# Patient Record
Sex: MALE | NOT HISPANIC OR LATINO | URBAN - METROPOLITAN AREA
[De-identification: names, ages, dates, MRNs, and addresses within clinical notes are randomized per-mention and may not be internally consistent; named-entity substitution may affect disease eponyms.]

---

## 2019-01-01 ENCOUNTER — AMBULATORY - HEALTHEAST (OUTPATIENT)
Dept: INFUSION THERAPY | Facility: CLINIC | Age: 79
End: 2019-01-01

## 2019-01-01 ENCOUNTER — HOSPITAL ENCOUNTER (OUTPATIENT)
Dept: PET IMAGING | Facility: HOSPITAL | Age: 79
Discharge: HOME | End: 2019-12-02
Attending: INTERNAL MEDICINE

## 2019-01-01 ENCOUNTER — OFFICE VISIT - HEALTHEAST (OUTPATIENT)
Dept: ONCOLOGY | Facility: CLINIC | Age: 79
End: 2019-01-01

## 2019-01-01 ENCOUNTER — INFUSION - HEALTHEAST (OUTPATIENT)
Dept: INFUSION THERAPY | Facility: CLINIC | Age: 79
End: 2019-01-01

## 2019-01-01 ENCOUNTER — COMMUNICATION - HEALTHEAST (OUTPATIENT)
Dept: RADIATION ONCOLOGY | Facility: CLINIC | Age: 79
End: 2019-01-01

## 2019-01-01 ENCOUNTER — HOSPITAL ENCOUNTER (OUTPATIENT)
Dept: PET IMAGING | Facility: HOSPITAL | Age: 79
Setting detail: RADIATION/ONCOLOGY SERIES
Discharge: STILL ADMITTED OR ADMINISTRATIVE | End: 2019-12-02
Attending: INTERNAL MEDICINE

## 2019-01-01 ENCOUNTER — HOSPITAL ENCOUNTER (OUTPATIENT)
Dept: RADIOLOGY | Facility: CLINIC | Age: 79
Discharge: HOME | End: 2019-12-18
Attending: RADIOLOGY

## 2019-01-01 ENCOUNTER — AMBULATORY - HEALTHEAST (OUTPATIENT)
Dept: ONCOLOGY | Facility: CLINIC | Age: 79
End: 2019-01-01

## 2019-01-01 ENCOUNTER — OFFICE VISIT - HEALTHEAST (OUTPATIENT)
Dept: RADIATION ONCOLOGY | Facility: CLINIC | Age: 79
End: 2019-01-01

## 2019-01-01 ENCOUNTER — HOSPITAL ENCOUNTER (OUTPATIENT)
Dept: CT IMAGING | Facility: CLINIC | Age: 79
Setting detail: RADIATION/ONCOLOGY SERIES
Discharge: STILL ADMITTED OR ADMINISTRATIVE | End: 2019-12-18
Attending: INTERNAL MEDICINE

## 2019-01-01 DIAGNOSIS — C34.11 PRIMARY CANCER OF RIGHT UPPER LOBE OF LUNG (H): ICD-10-CM

## 2019-01-01 DIAGNOSIS — N18.30 CHRONIC KIDNEY DISEASE (CKD), STAGE III (MODERATE) (H): ICD-10-CM

## 2019-01-01 DIAGNOSIS — Z51.12 ENCOUNTER FOR ANTINEOPLASTIC IMMUNOTHERAPY: ICD-10-CM

## 2019-01-01 DIAGNOSIS — D64.9 NORMOCHROMIC NORMOCYTIC ANEMIA: ICD-10-CM

## 2019-01-01 LAB
ALBUMIN SERPL-MCNC: 3 G/DL (ref 3.5–5)
ALBUMIN SERPL-MCNC: 3.2 G/DL (ref 3.5–5)
ALBUMIN SERPL-MCNC: 3.2 G/DL (ref 3.5–5)
ALBUMIN SERPL-MCNC: 3.3 G/DL (ref 3.5–5)
ALP SERPL-CCNC: 68 U/L (ref 45–120)
ALP SERPL-CCNC: 70 U/L (ref 45–120)
ALP SERPL-CCNC: 71 U/L (ref 45–120)
ALP SERPL-CCNC: 74 U/L (ref 45–120)
ALT SERPL W P-5'-P-CCNC: 12 U/L (ref 0–45)
ALT SERPL W P-5'-P-CCNC: 12 U/L (ref 0–45)
ALT SERPL W P-5'-P-CCNC: 14 U/L (ref 0–45)
ALT SERPL W P-5'-P-CCNC: 17 U/L (ref 0–45)
ANION GAP SERPL CALCULATED.3IONS-SCNC: 7 MMOL/L (ref 5–18)
ANION GAP SERPL CALCULATED.3IONS-SCNC: 7 MMOL/L (ref 5–18)
ANION GAP SERPL CALCULATED.3IONS-SCNC: 9 MMOL/L (ref 5–18)
ANION GAP SERPL CALCULATED.3IONS-SCNC: 9 MMOL/L (ref 5–18)
AST SERPL W P-5'-P-CCNC: 14 U/L (ref 0–40)
AST SERPL W P-5'-P-CCNC: 14 U/L (ref 0–40)
AST SERPL W P-5'-P-CCNC: 15 U/L (ref 0–40)
AST SERPL W P-5'-P-CCNC: 16 U/L (ref 0–40)
BASOPHILS # BLD AUTO: 0 THOU/UL (ref 0–0.2)
BASOPHILS NFR BLD AUTO: 0 % (ref 0–2)
BASOPHILS NFR BLD AUTO: 1 % (ref 0–2)
BILIRUB SERPL-MCNC: 0.3 MG/DL (ref 0–1)
BUN SERPL-MCNC: 27 MG/DL (ref 8–28)
BUN SERPL-MCNC: 29 MG/DL (ref 8–28)
BUN SERPL-MCNC: 29 MG/DL (ref 8–28)
BUN SERPL-MCNC: 36 MG/DL (ref 8–28)
CALCIUM SERPL-MCNC: 8.8 MG/DL (ref 8.5–10.5)
CALCIUM SERPL-MCNC: 8.9 MG/DL (ref 8.5–10.5)
CALCIUM SERPL-MCNC: 9.2 MG/DL (ref 8.5–10.5)
CALCIUM SERPL-MCNC: 9.2 MG/DL (ref 8.5–10.5)
CHLORIDE BLD-SCNC: 107 MMOL/L (ref 98–107)
CHLORIDE BLD-SCNC: 107 MMOL/L (ref 98–107)
CHLORIDE BLD-SCNC: 108 MMOL/L (ref 98–107)
CHLORIDE BLD-SCNC: 109 MMOL/L (ref 98–107)
CO2 SERPL-SCNC: 21 MMOL/L (ref 22–31)
CO2 SERPL-SCNC: 23 MMOL/L (ref 22–31)
CO2 SERPL-SCNC: 24 MMOL/L (ref 22–31)
CO2 SERPL-SCNC: 26 MMOL/L (ref 22–31)
CREAT SERPL-MCNC: 1.39 MG/DL (ref 0.7–1.3)
CREAT SERPL-MCNC: 1.42 MG/DL (ref 0.7–1.3)
CREAT SERPL-MCNC: 1.65 MG/DL (ref 0.7–1.3)
CREAT SERPL-MCNC: 1.79 MG/DL (ref 0.7–1.3)
EOSINOPHIL # BLD AUTO: 0.1 THOU/UL (ref 0–0.4)
EOSINOPHIL # BLD AUTO: 0.2 THOU/UL (ref 0–0.4)
EOSINOPHIL # BLD AUTO: 0.3 THOU/UL (ref 0–0.4)
EOSINOPHIL # BLD AUTO: 0.3 THOU/UL (ref 0–0.4)
EOSINOPHIL NFR BLD AUTO: 3 % (ref 0–6)
EOSINOPHIL NFR BLD AUTO: 5 % (ref 0–6)
EOSINOPHIL NFR BLD AUTO: 5 % (ref 0–6)
EOSINOPHIL NFR BLD AUTO: 6 % (ref 0–6)
ERYTHROCYTE [DISTWIDTH] IN BLOOD BY AUTOMATED COUNT: 15.3 % (ref 11–14.5)
ERYTHROCYTE [DISTWIDTH] IN BLOOD BY AUTOMATED COUNT: 16 % (ref 11–14.5)
ERYTHROCYTE [DISTWIDTH] IN BLOOD BY AUTOMATED COUNT: 18.5 % (ref 11–14.5)
ERYTHROCYTE [DISTWIDTH] IN BLOOD BY AUTOMATED COUNT: 20.4 % (ref 11–14.5)
GFR SERPL CREATININE-BSD FRML MDRD: 37 ML/MIN/1.73M2
GFR SERPL CREATININE-BSD FRML MDRD: 41 ML/MIN/1.73M2
GFR SERPL CREATININE-BSD FRML MDRD: 48 ML/MIN/1.73M2
GFR SERPL CREATININE-BSD FRML MDRD: 49 ML/MIN/1.73M2
GLUCOSE BLD-MCNC: 102 MG/DL (ref 70–125)
GLUCOSE BLD-MCNC: 114 MG/DL (ref 70–125)
GLUCOSE BLD-MCNC: 116 MG/DL (ref 70–125)
GLUCOSE BLD-MCNC: 141 MG/DL (ref 70–125)
GLUCOSE BLDC GLUCOMTR-MCNC: 91 MG/DL (ref 70–139)
HCT VFR BLD AUTO: 31.1 % (ref 40–54)
HCT VFR BLD AUTO: 33.7 % (ref 40–54)
HCT VFR BLD AUTO: 34 % (ref 40–54)
HCT VFR BLD AUTO: 34.9 % (ref 40–54)
HGB BLD-MCNC: 10.5 G/DL (ref 14–18)
HGB BLD-MCNC: 10.9 G/DL (ref 14–18)
HGB BLD-MCNC: 11.1 G/DL (ref 14–18)
HGB BLD-MCNC: 11.3 G/DL (ref 14–18)
HGB BLD-MCNC: 9.7 G/DL (ref 14–18)
INR PPP: 1.13 (ref 0.9–1.1)
LYMPHOCYTES # BLD AUTO: 0.5 THOU/UL (ref 0.8–4.4)
LYMPHOCYTES # BLD AUTO: 0.8 THOU/UL (ref 0.8–4.4)
LYMPHOCYTES # BLD AUTO: 0.9 THOU/UL (ref 0.8–4.4)
LYMPHOCYTES # BLD AUTO: 1 THOU/UL (ref 0.8–4.4)
LYMPHOCYTES NFR BLD AUTO: 11 % (ref 20–40)
LYMPHOCYTES NFR BLD AUTO: 17 % (ref 20–40)
LYMPHOCYTES NFR BLD AUTO: 18 % (ref 20–40)
LYMPHOCYTES NFR BLD AUTO: 18 % (ref 20–40)
MCH RBC QN AUTO: 29.8 PG (ref 27–34)
MCH RBC QN AUTO: 30.8 PG (ref 27–34)
MCH RBC QN AUTO: 31 PG (ref 27–34)
MCH RBC QN AUTO: 31.5 PG (ref 27–34)
MCHC RBC AUTO-ENTMCNC: 31.2 G/DL (ref 32–36)
MCHC RBC AUTO-ENTMCNC: 31.2 G/DL (ref 32–36)
MCHC RBC AUTO-ENTMCNC: 32.1 G/DL (ref 32–36)
MCHC RBC AUTO-ENTMCNC: 32.4 G/DL (ref 32–36)
MCV RBC AUTO: 96 FL (ref 80–100)
MCV RBC AUTO: 96 FL (ref 80–100)
MCV RBC AUTO: 98 FL (ref 80–100)
MCV RBC AUTO: 99 FL (ref 80–100)
MONOCYTES # BLD AUTO: 0.6 THOU/UL (ref 0–0.9)
MONOCYTES # BLD AUTO: 0.6 THOU/UL (ref 0–0.9)
MONOCYTES # BLD AUTO: 0.7 THOU/UL (ref 0–0.9)
MONOCYTES # BLD AUTO: 0.8 THOU/UL (ref 0–0.9)
MONOCYTES NFR BLD AUTO: 11 % (ref 2–10)
MONOCYTES NFR BLD AUTO: 14 % (ref 2–10)
MONOCYTES NFR BLD AUTO: 14 % (ref 2–10)
MONOCYTES NFR BLD AUTO: 15 % (ref 2–10)
NEUTROPHILS # BLD AUTO: 2.9 THOU/UL (ref 2–7.7)
NEUTROPHILS # BLD AUTO: 3 THOU/UL (ref 2–7.7)
NEUTROPHILS # BLD AUTO: 3.2 THOU/UL (ref 2–7.7)
NEUTROPHILS # BLD AUTO: 3.9 THOU/UL (ref 2–7.7)
NEUTROPHILS NFR BLD AUTO: 62 % (ref 50–70)
NEUTROPHILS NFR BLD AUTO: 64 % (ref 50–70)
NEUTROPHILS NFR BLD AUTO: 64 % (ref 50–70)
NEUTROPHILS NFR BLD AUTO: 70 % (ref 50–70)
PLATELET # BLD AUTO: 160 THOU/UL (ref 140–440)
PLATELET # BLD AUTO: 180 THOU/UL (ref 140–440)
PLATELET # BLD AUTO: 183 THOU/UL (ref 140–440)
PLATELET # BLD AUTO: 200 THOU/UL (ref 140–440)
PLATELET # BLD AUTO: 212 THOU/UL (ref 140–440)
PMV BLD AUTO: 10 FL (ref 8.5–12.5)
PMV BLD AUTO: 9.2 FL (ref 8.5–12.5)
PMV BLD AUTO: 9.7 FL (ref 8.5–12.5)
PMV BLD AUTO: 9.9 FL (ref 8.5–12.5)
POTASSIUM BLD-SCNC: 4.2 MMOL/L (ref 3.5–5)
POTASSIUM BLD-SCNC: 4.4 MMOL/L (ref 3.5–5)
POTASSIUM BLD-SCNC: 4.5 MMOL/L (ref 3.5–5)
POTASSIUM BLD-SCNC: 4.6 MMOL/L (ref 3.5–5)
PROT SERPL-MCNC: 6.1 G/DL (ref 6–8)
PROT SERPL-MCNC: 6.2 G/DL (ref 6–8)
PROT SERPL-MCNC: 6.5 G/DL (ref 6–8)
PROT SERPL-MCNC: 6.6 G/DL (ref 6–8)
RBC # BLD AUTO: 3.15 MILL/UL (ref 4.4–6.2)
RBC # BLD AUTO: 3.46 MILL/UL (ref 4.4–6.2)
RBC # BLD AUTO: 3.52 MILL/UL (ref 4.4–6.2)
RBC # BLD AUTO: 3.64 MILL/UL (ref 4.4–6.2)
SODIUM SERPL-SCNC: 137 MMOL/L (ref 136–145)
SODIUM SERPL-SCNC: 139 MMOL/L (ref 136–145)
SODIUM SERPL-SCNC: 140 MMOL/L (ref 136–145)
SODIUM SERPL-SCNC: 141 MMOL/L (ref 136–145)
TSH SERPL DL<=0.005 MIU/L-ACNC: 1.12 UIU/ML (ref 0.3–5)
TSH SERPL DL<=0.005 MIU/L-ACNC: 1.25 UIU/ML (ref 0.3–5)
TSH SERPL DL<=0.005 MIU/L-ACNC: 1.38 UIU/ML (ref 0.3–5)
TSH SERPL DL<=0.005 MIU/L-ACNC: 1.75 UIU/ML (ref 0.3–5)
WBC: 4.2 THOU/UL (ref 4–11)
WBC: 4.8 THOU/UL (ref 4–11)
WBC: 5 THOU/UL (ref 4–11)
WBC: 6.2 THOU/UL (ref 4–11)

## 2019-01-01 ASSESSMENT — MIFFLIN-ST. JEOR: SCORE: 1178.92

## 2019-03-13 ENCOUNTER — COMMUNICATION - HEALTHEAST (OUTPATIENT)
Dept: INTERNAL MEDICINE | Facility: CLINIC | Age: 79
End: 2019-03-13

## 2019-06-25 ENCOUNTER — RECORDS - HEALTHEAST (OUTPATIENT)
Dept: ADMINISTRATIVE | Facility: OTHER | Age: 79
End: 2019-06-25

## 2019-06-27 ENCOUNTER — RECORDS - HEALTHEAST (OUTPATIENT)
Dept: ADMINISTRATIVE | Facility: OTHER | Age: 79
End: 2019-06-27

## 2019-07-13 ENCOUNTER — RECORDS - HEALTHEAST (OUTPATIENT)
Dept: ADMINISTRATIVE | Facility: OTHER | Age: 79
End: 2019-07-13

## 2019-07-23 ENCOUNTER — OFFICE VISIT - HEALTHEAST (OUTPATIENT)
Dept: FAMILY MEDICINE | Facility: CLINIC | Age: 79
End: 2019-07-23

## 2019-07-23 DIAGNOSIS — D64.9 ANEMIA, UNSPECIFIED TYPE: ICD-10-CM

## 2019-07-23 DIAGNOSIS — C34.11 MALIGNANT NEOPLASM OF UPPER LOBE OF RIGHT LUNG (H): ICD-10-CM

## 2019-07-23 DIAGNOSIS — I10 ESSENTIAL HYPERTENSION: ICD-10-CM

## 2019-07-23 LAB
ALBUMIN SERPL-MCNC: 3.5 G/DL (ref 3.5–5)
ALP SERPL-CCNC: 76 U/L (ref 45–120)
ALT SERPL W P-5'-P-CCNC: 10 U/L (ref 0–45)
ANION GAP SERPL CALCULATED.3IONS-SCNC: 10 MMOL/L (ref 5–18)
AST SERPL W P-5'-P-CCNC: 15 U/L (ref 0–40)
BASOPHILS # BLD AUTO: 0 THOU/UL (ref 0–0.2)
BASOPHILS NFR BLD AUTO: 1 % (ref 0–2)
BILIRUB SERPL-MCNC: 0.3 MG/DL (ref 0–1)
BUN SERPL-MCNC: 34 MG/DL (ref 8–28)
CALCIUM SERPL-MCNC: 9.2 MG/DL (ref 8.5–10.5)
CHLORIDE BLD-SCNC: 101 MMOL/L (ref 98–107)
CO2 SERPL-SCNC: 23 MMOL/L (ref 22–31)
CREAT SERPL-MCNC: 1.95 MG/DL (ref 0.7–1.3)
EOSINOPHIL # BLD AUTO: 0.1 THOU/UL (ref 0–0.4)
EOSINOPHIL NFR BLD AUTO: 2 % (ref 0–6)
ERYTHROCYTE [DISTWIDTH] IN BLOOD BY AUTOMATED COUNT: 14.5 % (ref 11–14.5)
FERRITIN SERPL-MCNC: 271 NG/ML (ref 27–300)
GFR SERPL CREATININE-BSD FRML MDRD: 33 ML/MIN/1.73M2
GLUCOSE BLD-MCNC: 108 MG/DL (ref 70–125)
HCT VFR BLD AUTO: 32.9 % (ref 40–54)
HGB BLD-MCNC: 11 G/DL (ref 14–18)
IRON SATN MFR SERPL: 10 % (ref 20–50)
IRON SERPL-MCNC: 27 UG/DL (ref 42–175)
LYMPHOCYTES # BLD AUTO: 1.4 THOU/UL (ref 0.8–4.4)
LYMPHOCYTES NFR BLD AUTO: 23 % (ref 20–40)
MCH RBC QN AUTO: 29.6 PG (ref 27–34)
MCHC RBC AUTO-ENTMCNC: 33.6 G/DL (ref 32–36)
MCV RBC AUTO: 88 FL (ref 80–100)
MONOCYTES # BLD AUTO: 0.6 THOU/UL (ref 0–0.9)
MONOCYTES NFR BLD AUTO: 9 % (ref 2–10)
NEUTROPHILS # BLD AUTO: 4.1 THOU/UL (ref 2–7.7)
NEUTROPHILS NFR BLD AUTO: 65 % (ref 50–70)
PLATELET # BLD AUTO: 273 THOU/UL (ref 140–440)
PMV BLD AUTO: 8.1 FL (ref 7–10)
POTASSIUM BLD-SCNC: 4.7 MMOL/L (ref 3.5–5)
PROT SERPL-MCNC: 6.7 G/DL (ref 6–8)
RBC # BLD AUTO: 3.73 MILL/UL (ref 4.4–6.2)
SODIUM SERPL-SCNC: 134 MMOL/L (ref 136–145)
TIBC SERPL-MCNC: 267 UG/DL (ref 313–563)
TRANSFERRIN SERPL-MCNC: 213 MG/DL (ref 212–360)
WBC: 6.3 THOU/UL (ref 4–11)

## 2019-07-24 ENCOUNTER — COMMUNICATION - HEALTHEAST (OUTPATIENT)
Dept: FAMILY MEDICINE | Facility: CLINIC | Age: 79
End: 2019-07-24

## 2019-07-24 ENCOUNTER — OFFICE VISIT - HEALTHEAST (OUTPATIENT)
Dept: PULMONOLOGY | Facility: OTHER | Age: 79
End: 2019-07-24

## 2019-07-24 DIAGNOSIS — R91.8 LUNG MASS: ICD-10-CM

## 2019-07-24 ASSESSMENT — MIFFLIN-ST. JEOR: SCORE: 1197.07

## 2019-07-26 ENCOUNTER — COMMUNICATION - HEALTHEAST (OUTPATIENT)
Dept: NURSING | Facility: CLINIC | Age: 79
End: 2019-07-26

## 2019-07-29 ENCOUNTER — AMBULATORY - HEALTHEAST (OUTPATIENT)
Dept: PULMONOLOGY | Facility: OTHER | Age: 79
End: 2019-07-29

## 2019-07-31 ENCOUNTER — HOSPITAL ENCOUNTER (OUTPATIENT)
Dept: CT IMAGING | Facility: CLINIC | Age: 79
Discharge: HOME | End: 2019-07-31
Attending: INTERNAL MEDICINE

## 2019-07-31 ENCOUNTER — HOSPITAL ENCOUNTER (OUTPATIENT)
Dept: RESPIRATORY THERAPY | Facility: CLINIC | Age: 79
Discharge: HOME | End: 2019-07-31
Attending: INTERNAL MEDICINE

## 2019-07-31 DIAGNOSIS — R91.8 LUNG MASS: ICD-10-CM

## 2019-08-02 ENCOUNTER — OFFICE VISIT - HEALTHEAST (OUTPATIENT)
Dept: PULMONOLOGY | Facility: OTHER | Age: 79
End: 2019-08-02

## 2019-08-02 DIAGNOSIS — R91.8 LUNG MASS: ICD-10-CM

## 2019-08-05 ENCOUNTER — AMBULATORY - HEALTHEAST (OUTPATIENT)
Dept: FAMILY MEDICINE | Facility: CLINIC | Age: 79
End: 2019-08-05

## 2019-08-05 ENCOUNTER — COMMUNICATION - HEALTHEAST (OUTPATIENT)
Dept: SCHEDULING | Facility: CLINIC | Age: 79
End: 2019-08-05

## 2019-08-05 DIAGNOSIS — N18.30 CKD (CHRONIC KIDNEY DISEASE) STAGE 3, GFR 30-59 ML/MIN (H): ICD-10-CM

## 2019-08-09 ENCOUNTER — COMMUNICATION - HEALTHEAST (OUTPATIENT)
Dept: PULMONOLOGY | Facility: OTHER | Age: 79
End: 2019-08-09

## 2019-08-09 ENCOUNTER — HOSPITAL ENCOUNTER (OUTPATIENT)
Dept: PET IMAGING | Facility: HOSPITAL | Age: 79
Discharge: HOME | End: 2019-08-09
Attending: INTERNAL MEDICINE

## 2019-08-09 DIAGNOSIS — R91.8 LUNG MASS: ICD-10-CM

## 2019-08-09 LAB — GLUCOSE BLDC GLUCOMTR-MCNC: 101 MG/DL (ref 70–139)

## 2019-08-09 ASSESSMENT — MIFFLIN-ST. JEOR: SCORE: 1197.07

## 2019-08-14 ENCOUNTER — HOSPITAL ENCOUNTER (OUTPATIENT)
Dept: RESPIRATORY THERAPY | Facility: CLINIC | Age: 79
Discharge: HOME | End: 2019-08-14
Attending: INTERNAL MEDICINE | Admitting: INTERNAL MEDICINE

## 2019-08-14 DIAGNOSIS — R91.8 LUNG MASS: ICD-10-CM

## 2019-08-14 ASSESSMENT — MIFFLIN-ST. JEOR: SCORE: 1197.07

## 2019-08-15 ENCOUNTER — AMBULATORY - HEALTHEAST (OUTPATIENT)
Dept: INTENSIVE CARE | Facility: CLINIC | Age: 79
End: 2019-08-15

## 2019-08-15 DIAGNOSIS — R05.9 COUGH: ICD-10-CM

## 2019-08-15 LAB
LAB AP CHARGES (HE HISTORICAL CONVERSION): ABNORMAL
PATH REPORT.COMMENTS IMP SPEC: ABNORMAL
PATH REPORT.COMMENTS IMP SPEC: ABNORMAL
PATH REPORT.FINAL DX SPEC: ABNORMAL
PATH REPORT.GROSS SPEC: ABNORMAL
PATH REPORT.MICROSCOPIC SPEC OTHER STN: ABNORMAL
PATH REPORT.RELEVANT HX SPEC: ABNORMAL
RESULT FLAG (HE HISTORICAL CONVERSION): ABNORMAL

## 2019-08-16 ENCOUNTER — COMMUNICATION - HEALTHEAST (OUTPATIENT)
Dept: ONCOLOGY | Facility: HOSPITAL | Age: 79
End: 2019-08-16

## 2019-08-16 ENCOUNTER — AMBULATORY - HEALTHEAST (OUTPATIENT)
Dept: INTENSIVE CARE | Facility: CLINIC | Age: 79
End: 2019-08-16

## 2019-08-16 ENCOUNTER — AMBULATORY - HEALTHEAST (OUTPATIENT)
Dept: PULMONOLOGY | Facility: OTHER | Age: 79
End: 2019-08-16

## 2019-08-16 ENCOUNTER — AMBULATORY - HEALTHEAST (OUTPATIENT)
Dept: ONCOLOGY | Facility: HOSPITAL | Age: 79
End: 2019-08-16

## 2019-08-16 ENCOUNTER — RECORDS - HEALTHEAST (OUTPATIENT)
Dept: ADMINISTRATIVE | Facility: OTHER | Age: 79
End: 2019-08-16

## 2019-08-16 DIAGNOSIS — Z13.9 SCREENING FOR CONDITION: ICD-10-CM

## 2019-08-16 DIAGNOSIS — R05.9 COUGH: ICD-10-CM

## 2019-08-19 ENCOUNTER — AMBULATORY - HEALTHEAST (OUTPATIENT)
Dept: PULMONOLOGY | Facility: OTHER | Age: 79
End: 2019-08-19

## 2019-08-19 ENCOUNTER — AMBULATORY - HEALTHEAST (OUTPATIENT)
Dept: LAB | Facility: HOSPITAL | Age: 79
End: 2019-08-19

## 2019-08-19 DIAGNOSIS — R05.9 COUGH: ICD-10-CM

## 2019-08-19 DIAGNOSIS — Z11.1 SCREENING EXAMINATION FOR PULMONARY TUBERCULOSIS: ICD-10-CM

## 2019-08-19 LAB
GAMMA INTERFERON BACKGROUND BLD IA-ACNC: 0.06 IU/ML
M TB IFN-G BLD-IMP: NEGATIVE
MITOGEN IGNF BCKGRD COR BLD-ACNC: -0.03 IU/ML
MITOGEN IGNF BCKGRD COR BLD-ACNC: -0.03 IU/ML
QTF INTERPRETATION: NORMAL
QTF MITOGEN - NIL: 7.9 IU/ML

## 2019-08-20 ENCOUNTER — OFFICE VISIT - HEALTHEAST (OUTPATIENT)
Dept: ONCOLOGY | Facility: CLINIC | Age: 79
End: 2019-08-20

## 2019-08-20 DIAGNOSIS — C34.11 PRIMARY CANCER OF RIGHT UPPER LOBE OF LUNG (H): ICD-10-CM

## 2019-08-20 DIAGNOSIS — N18.30 CHRONIC KIDNEY DISEASE (CKD), STAGE III (MODERATE) (H): ICD-10-CM

## 2019-08-20 LAB
ALBUMIN SERPL-MCNC: 3.3 G/DL (ref 3.5–5)
ALP SERPL-CCNC: 79 U/L (ref 45–120)
ALT SERPL W P-5'-P-CCNC: <9 U/L (ref 0–45)
ANION GAP SERPL CALCULATED.3IONS-SCNC: 7 MMOL/L (ref 5–18)
AST SERPL W P-5'-P-CCNC: 11 U/L (ref 0–40)
BASOPHILS # BLD AUTO: 0.1 THOU/UL (ref 0–0.2)
BASOPHILS NFR BLD AUTO: 1 % (ref 0–2)
BILIRUB SERPL-MCNC: 0.3 MG/DL (ref 0–1)
BUN SERPL-MCNC: 34 MG/DL (ref 8–28)
CALCIUM SERPL-MCNC: 9.9 MG/DL (ref 8.5–10.5)
CAP COMMENT: ABNORMAL
CHLORIDE BLD-SCNC: 107 MMOL/L (ref 98–107)
CO2 SERPL-SCNC: 24 MMOL/L (ref 22–31)
CREAT SERPL-MCNC: 1.97 MG/DL (ref 0.7–1.3)
EOSINOPHIL # BLD AUTO: 0.3 THOU/UL (ref 0–0.4)
EOSINOPHIL NFR BLD AUTO: 5 % (ref 0–6)
ERYTHROCYTE [DISTWIDTH] IN BLOOD BY AUTOMATED COUNT: 15.2 % (ref 11–14.5)
GFR SERPL CREATININE-BSD FRML MDRD: 33 ML/MIN/1.73M2
GLUCOSE BLD-MCNC: 141 MG/DL (ref 70–125)
HCT VFR BLD AUTO: 33.9 % (ref 40–54)
HGB BLD-MCNC: 10.9 G/DL (ref 14–18)
LAB AP CHARGES (HE HISTORICAL CONVERSION): ABNORMAL
LAB AP INITIAL CYTO EVAL (HE HISTORICAL CONVERSION): ABNORMAL
LAB MED GENERAL PATH INTERP (HE HISTORICAL CONVERSION): ABNORMAL
LDH SERPL L TO P-CCNC: 132 U/L (ref 125–220)
LYMPHOCYTES # BLD AUTO: 1 THOU/UL (ref 0.8–4.4)
LYMPHOCYTES NFR BLD AUTO: 15 % (ref 20–40)
MCH RBC QN AUTO: 29.5 PG (ref 27–34)
MCHC RBC AUTO-ENTMCNC: 32.2 G/DL (ref 32–36)
MCV RBC AUTO: 92 FL (ref 80–100)
MONOCYTES # BLD AUTO: 0.5 THOU/UL (ref 0–0.9)
MONOCYTES NFR BLD AUTO: 8 % (ref 2–10)
NEUTROPHILS # BLD AUTO: 4.8 THOU/UL (ref 2–7.7)
NEUTROPHILS NFR BLD AUTO: 71 % (ref 50–70)
PATH REPORT.COMMENTS IMP SPEC: ABNORMAL
PATH REPORT.COMMENTS IMP SPEC: ABNORMAL
PATH REPORT.FINAL DX SPEC: ABNORMAL
PATH REPORT.MICROSCOPIC SPEC OTHER STN: ABNORMAL
PATH REPORT.RELEVANT HX SPEC: ABNORMAL
PLATELET # BLD AUTO: 276 THOU/UL (ref 140–440)
PMV BLD AUTO: 10.7 FL (ref 8.5–12.5)
POTASSIUM BLD-SCNC: 4.2 MMOL/L (ref 3.5–5)
PROT SERPL-MCNC: 7.1 G/DL (ref 6–8)
RBC # BLD AUTO: 3.7 MILL/UL (ref 4.4–6.2)
RESULT FLAG (HE HISTORICAL CONVERSION): ABNORMAL
SODIUM SERPL-SCNC: 138 MMOL/L (ref 136–145)
SPECIMEN DESCRIPTION: ABNORMAL
WBC: 6.7 THOU/UL (ref 4–11)

## 2019-08-20 ASSESSMENT — MIFFLIN-ST. JEOR: SCORE: 1170.76

## 2019-08-22 LAB
SPECIMEN STATUS: NORMAL
SPECIMEN STATUS: NORMAL

## 2019-08-25 ENCOUNTER — RECORDS - HEALTHEAST (OUTPATIENT)
Dept: ADMINISTRATIVE | Facility: OTHER | Age: 79
End: 2019-08-25

## 2019-08-26 ENCOUNTER — OFFICE VISIT - HEALTHEAST (OUTPATIENT)
Dept: RADIATION ONCOLOGY | Facility: CLINIC | Age: 79
End: 2019-08-26

## 2019-08-26 DIAGNOSIS — C34.11 PRIMARY CANCER OF RIGHT UPPER LOBE OF LUNG (H): ICD-10-CM

## 2019-08-27 ENCOUNTER — AMBULATORY - HEALTHEAST (OUTPATIENT)
Dept: RADIATION ONCOLOGY | Facility: HOSPITAL | Age: 79
End: 2019-08-27

## 2019-08-27 ENCOUNTER — AMBULATORY - HEALTHEAST (OUTPATIENT)
Dept: PULMONOLOGY | Facility: OTHER | Age: 79
End: 2019-08-27

## 2019-08-27 DIAGNOSIS — C34.90 LUNG CANCER (H): ICD-10-CM

## 2019-08-28 ENCOUNTER — COMMUNICATION - HEALTHEAST (OUTPATIENT)
Dept: PULMONOLOGY | Facility: OTHER | Age: 79
End: 2019-08-28

## 2019-08-28 ENCOUNTER — HOSPITAL ENCOUNTER (OUTPATIENT)
Dept: MRI IMAGING | Facility: CLINIC | Age: 79
Setting detail: RADIATION/ONCOLOGY SERIES
Discharge: STILL ADMITTED OR ADMINISTRATIVE | End: 2019-08-28
Attending: INTERNAL MEDICINE

## 2019-08-28 DIAGNOSIS — C34.11 PRIMARY CANCER OF RIGHT UPPER LOBE OF LUNG (H): ICD-10-CM

## 2019-08-29 ENCOUNTER — RECORDS - HEALTHEAST (OUTPATIENT)
Dept: ADMINISTRATIVE | Facility: OTHER | Age: 79
End: 2019-08-29

## 2019-08-30 ENCOUNTER — HOSPITAL ENCOUNTER (OUTPATIENT)
Dept: ULTRASOUND IMAGING | Facility: CLINIC | Age: 79
Discharge: HOME | End: 2019-08-30
Attending: INTERNAL MEDICINE

## 2019-08-30 DIAGNOSIS — N18.4 CHRONIC KIDNEY DISEASE, STAGE IV (SEVERE) (H): ICD-10-CM

## 2019-09-03 ENCOUNTER — COMMUNICATION - HEALTHEAST (OUTPATIENT)
Dept: SCHEDULING | Facility: CLINIC | Age: 79
End: 2019-09-03

## 2019-09-04 ENCOUNTER — COMMUNICATION - HEALTHEAST (OUTPATIENT)
Dept: FAMILY MEDICINE | Facility: CLINIC | Age: 79
End: 2019-09-04

## 2019-09-04 ENCOUNTER — OFFICE VISIT - HEALTHEAST (OUTPATIENT)
Dept: ONCOLOGY | Facility: CLINIC | Age: 79
End: 2019-09-04

## 2019-09-04 DIAGNOSIS — I73.9 PERIPHERAL VASCULAR DISEASE (H): ICD-10-CM

## 2019-09-04 DIAGNOSIS — N18.30 CHRONIC KIDNEY DISEASE (CKD), STAGE III (MODERATE) (H): ICD-10-CM

## 2019-09-04 DIAGNOSIS — I10 ESSENTIAL HYPERTENSION: ICD-10-CM

## 2019-09-04 DIAGNOSIS — C34.11 PRIMARY CANCER OF RIGHT UPPER LOBE OF LUNG (H): ICD-10-CM

## 2019-09-04 DIAGNOSIS — J43.1 PANLOBULAR EMPHYSEMA (H): ICD-10-CM

## 2019-09-05 ENCOUNTER — AMBULATORY - HEALTHEAST (OUTPATIENT)
Dept: INFUSION THERAPY | Facility: HOSPITAL | Age: 79
End: 2019-09-05

## 2019-09-05 DIAGNOSIS — C34.11 PRIMARY CANCER OF RIGHT UPPER LOBE OF LUNG (H): ICD-10-CM

## 2019-09-06 ENCOUNTER — OFFICE VISIT - HEALTHEAST (OUTPATIENT)
Dept: PULMONOLOGY | Facility: OTHER | Age: 79
End: 2019-09-06

## 2019-09-06 DIAGNOSIS — J44.9 CHRONIC OBSTRUCTIVE PULMONARY DISEASE, UNSPECIFIED COPD TYPE (H): ICD-10-CM

## 2019-09-06 ASSESSMENT — MIFFLIN-ST. JEOR: SCORE: 1169.85

## 2019-09-09 ENCOUNTER — AMBULATORY - HEALTHEAST (OUTPATIENT)
Dept: INFUSION THERAPY | Facility: CLINIC | Age: 79
End: 2019-09-09

## 2019-09-09 ENCOUNTER — AMBULATORY - HEALTHEAST (OUTPATIENT)
Dept: ONCOLOGY | Facility: HOSPITAL | Age: 79
End: 2019-09-09

## 2019-09-09 ENCOUNTER — OFFICE VISIT - HEALTHEAST (OUTPATIENT)
Dept: ONCOLOGY | Facility: CLINIC | Age: 79
End: 2019-09-09

## 2019-09-09 ENCOUNTER — INFUSION - HEALTHEAST (OUTPATIENT)
Dept: INFUSION THERAPY | Facility: CLINIC | Age: 79
End: 2019-09-09

## 2019-09-09 DIAGNOSIS — Z51.11 ENCOUNTER FOR ANTINEOPLASTIC CHEMOTHERAPY: ICD-10-CM

## 2019-09-09 DIAGNOSIS — N18.30 CHRONIC KIDNEY DISEASE (CKD), STAGE III (MODERATE) (H): ICD-10-CM

## 2019-09-09 DIAGNOSIS — C34.11 PRIMARY CANCER OF RIGHT UPPER LOBE OF LUNG (H): ICD-10-CM

## 2019-09-09 LAB
ALBUMIN SERPL-MCNC: 3.1 G/DL (ref 3.5–5)
ALP SERPL-CCNC: 73 U/L (ref 45–120)
ALT SERPL W P-5'-P-CCNC: 10 U/L (ref 0–45)
ANION GAP SERPL CALCULATED.3IONS-SCNC: 8 MMOL/L (ref 5–18)
AST SERPL W P-5'-P-CCNC: 12 U/L (ref 0–40)
BASOPHILS # BLD AUTO: 0.1 THOU/UL (ref 0–0.2)
BASOPHILS NFR BLD AUTO: 1 % (ref 0–2)
BILIRUB SERPL-MCNC: 0.2 MG/DL (ref 0–1)
BUN SERPL-MCNC: 26 MG/DL (ref 8–28)
CALCIUM SERPL-MCNC: 9.4 MG/DL (ref 8.5–10.5)
CHLORIDE BLD-SCNC: 108 MMOL/L (ref 98–107)
CO2 SERPL-SCNC: 21 MMOL/L (ref 22–31)
CREAT SERPL-MCNC: 1.66 MG/DL (ref 0.7–1.3)
EOSINOPHIL # BLD AUTO: 0.4 THOU/UL (ref 0–0.4)
EOSINOPHIL NFR BLD AUTO: 6 % (ref 0–6)
ERYTHROCYTE [DISTWIDTH] IN BLOOD BY AUTOMATED COUNT: 15 % (ref 11–14.5)
GFR SERPL CREATININE-BSD FRML MDRD: 40 ML/MIN/1.73M2
GLUCOSE BLD-MCNC: 127 MG/DL (ref 70–125)
HCT VFR BLD AUTO: 32.5 % (ref 40–54)
HGB BLD-MCNC: 10.2 G/DL (ref 14–18)
LYMPHOCYTES # BLD AUTO: 1.2 THOU/UL (ref 0.8–4.4)
LYMPHOCYTES NFR BLD AUTO: 16 % (ref 20–40)
MCH RBC QN AUTO: 28.9 PG (ref 27–34)
MCHC RBC AUTO-ENTMCNC: 31.4 G/DL (ref 32–36)
MCV RBC AUTO: 92 FL (ref 80–100)
MONOCYTES # BLD AUTO: 0.6 THOU/UL (ref 0–0.9)
MONOCYTES NFR BLD AUTO: 8 % (ref 2–10)
NEUTROPHILS # BLD AUTO: 5.3 THOU/UL (ref 2–7.7)
NEUTROPHILS NFR BLD AUTO: 70 % (ref 50–70)
PLATELET # BLD AUTO: 274 THOU/UL (ref 140–440)
PMV BLD AUTO: 10.3 FL (ref 8.5–12.5)
POTASSIUM BLD-SCNC: 3.7 MMOL/L (ref 3.5–5)
PROT SERPL-MCNC: 6.8 G/DL (ref 6–8)
RBC # BLD AUTO: 3.53 MILL/UL (ref 4.4–6.2)
SODIUM SERPL-SCNC: 137 MMOL/L (ref 136–145)
WBC: 7.6 THOU/UL (ref 4–11)

## 2019-09-10 ENCOUNTER — OFFICE VISIT - HEALTHEAST (OUTPATIENT)
Dept: INFECTIOUS DISEASES | Facility: CLINIC | Age: 79
End: 2019-09-10

## 2019-09-10 ENCOUNTER — COMMUNICATION - HEALTHEAST (OUTPATIENT)
Dept: ONCOLOGY | Facility: CLINIC | Age: 79
End: 2019-09-10

## 2019-09-10 DIAGNOSIS — A31.9 MYCOBACTERIA, ATYPICAL: ICD-10-CM

## 2019-09-10 DIAGNOSIS — R91.8 LUNG MASS: ICD-10-CM

## 2019-09-11 ENCOUNTER — HOSPITAL ENCOUNTER (OUTPATIENT)
Dept: INTERVENTIONAL RADIOLOGY/VASCULAR | Facility: HOSPITAL | Age: 79
Setting detail: RADIATION/ONCOLOGY SERIES
Discharge: STILL ADMITTED OR ADMINISTRATIVE | End: 2019-09-11
Attending: INTERNAL MEDICINE

## 2019-09-11 DIAGNOSIS — R25.9 ABNORMAL INVOLUNTARY MOVEMENT: ICD-10-CM

## 2019-09-11 DIAGNOSIS — Z51.11 ENCOUNTER FOR ANTINEOPLASTIC CHEMOTHERAPY: ICD-10-CM

## 2019-09-11 DIAGNOSIS — I10 ESSENTIAL HYPERTENSION: ICD-10-CM

## 2019-09-11 DIAGNOSIS — I73.9 PERIPHERAL VASCULAR DISEASE (H): ICD-10-CM

## 2019-09-11 DIAGNOSIS — N18.30 CHRONIC KIDNEY DISEASE (CKD), STAGE III (MODERATE) (H): ICD-10-CM

## 2019-09-11 DIAGNOSIS — C34.11 PRIMARY CANCER OF RIGHT UPPER LOBE OF LUNG (H): ICD-10-CM

## 2019-09-11 ASSESSMENT — MIFFLIN-ST. JEOR: SCORE: 1165.32

## 2019-09-12 ENCOUNTER — OFFICE VISIT - HEALTHEAST (OUTPATIENT)
Dept: FAMILY MEDICINE | Facility: CLINIC | Age: 79
End: 2019-09-12

## 2019-09-12 ENCOUNTER — AMBULATORY - HEALTHEAST (OUTPATIENT)
Dept: ONCOLOGY | Facility: CLINIC | Age: 79
End: 2019-09-12

## 2019-09-12 ENCOUNTER — OFFICE VISIT - HEALTHEAST (OUTPATIENT)
Dept: RADIATION ONCOLOGY | Facility: CLINIC | Age: 79
End: 2019-09-12

## 2019-09-12 DIAGNOSIS — C34.11 PRIMARY CANCER OF RIGHT UPPER LOBE OF LUNG (H): ICD-10-CM

## 2019-09-12 DIAGNOSIS — I73.9 PERIPHERAL VASCULAR DISEASE (H): ICD-10-CM

## 2019-09-12 DIAGNOSIS — Z76.89 ESTABLISHING CARE WITH NEW DOCTOR, ENCOUNTER FOR: ICD-10-CM

## 2019-09-16 ENCOUNTER — INFUSION - HEALTHEAST (OUTPATIENT)
Dept: INFUSION THERAPY | Facility: CLINIC | Age: 79
End: 2019-09-16

## 2019-09-16 ENCOUNTER — OFFICE VISIT - HEALTHEAST (OUTPATIENT)
Dept: ONCOLOGY | Facility: CLINIC | Age: 79
End: 2019-09-16

## 2019-09-16 ENCOUNTER — AMBULATORY - HEALTHEAST (OUTPATIENT)
Dept: INFUSION THERAPY | Facility: CLINIC | Age: 79
End: 2019-09-16

## 2019-09-16 DIAGNOSIS — K21.00 GASTROESOPHAGEAL REFLUX DISEASE WITH ESOPHAGITIS: ICD-10-CM

## 2019-09-16 DIAGNOSIS — C34.11 PRIMARY CANCER OF RIGHT UPPER LOBE OF LUNG (H): ICD-10-CM

## 2019-09-16 DIAGNOSIS — D64.9 NORMOCHROMIC NORMOCYTIC ANEMIA: ICD-10-CM

## 2019-09-16 DIAGNOSIS — Z51.11 ENCOUNTER FOR ANTINEOPLASTIC CHEMOTHERAPY: ICD-10-CM

## 2019-09-16 LAB
ALBUMIN SERPL-MCNC: 3.1 G/DL (ref 3.5–5)
ALP SERPL-CCNC: 77 U/L (ref 45–120)
ALT SERPL W P-5'-P-CCNC: 16 U/L (ref 0–45)
ANION GAP SERPL CALCULATED.3IONS-SCNC: 8 MMOL/L (ref 5–18)
AST SERPL W P-5'-P-CCNC: 18 U/L (ref 0–40)
BASOPHILS # BLD AUTO: 0 THOU/UL (ref 0–0.2)
BASOPHILS NFR BLD AUTO: 1 % (ref 0–2)
BILIRUB SERPL-MCNC: 0.2 MG/DL (ref 0–1)
BUN SERPL-MCNC: 39 MG/DL (ref 8–28)
CALCIUM SERPL-MCNC: 9.6 MG/DL (ref 8.5–10.5)
CHLORIDE BLD-SCNC: 105 MMOL/L (ref 98–107)
CO2 SERPL-SCNC: 24 MMOL/L (ref 22–31)
CREAT SERPL-MCNC: 1.8 MG/DL (ref 0.7–1.3)
EOSINOPHIL # BLD AUTO: 0.3 THOU/UL (ref 0–0.4)
EOSINOPHIL NFR BLD AUTO: 5 % (ref 0–6)
ERYTHROCYTE [DISTWIDTH] IN BLOOD BY AUTOMATED COUNT: 14.6 % (ref 11–14.5)
GFR SERPL CREATININE-BSD FRML MDRD: 37 ML/MIN/1.73M2
GLUCOSE BLD-MCNC: 118 MG/DL (ref 70–125)
HCT VFR BLD AUTO: 30.4 % (ref 40–54)
HGB BLD-MCNC: 9.8 G/DL (ref 14–18)
LYMPHOCYTES # BLD AUTO: 0.8 THOU/UL (ref 0.8–4.4)
LYMPHOCYTES NFR BLD AUTO: 15 % (ref 20–40)
MCH RBC QN AUTO: 29.3 PG (ref 27–34)
MCHC RBC AUTO-ENTMCNC: 32.2 G/DL (ref 32–36)
MCV RBC AUTO: 91 FL (ref 80–100)
MONOCYTES # BLD AUTO: 0.4 THOU/UL (ref 0–0.9)
MONOCYTES NFR BLD AUTO: 7 % (ref 2–10)
NEUTROPHILS # BLD AUTO: 3.8 THOU/UL (ref 2–7.7)
NEUTROPHILS NFR BLD AUTO: 71 % (ref 50–70)
PLATELET # BLD AUTO: 229 THOU/UL (ref 140–440)
PMV BLD AUTO: 9.7 FL (ref 8.5–12.5)
POTASSIUM BLD-SCNC: 4.2 MMOL/L (ref 3.5–5)
PROT SERPL-MCNC: 6.7 G/DL (ref 6–8)
RBC # BLD AUTO: 3.34 MILL/UL (ref 4.4–6.2)
SODIUM SERPL-SCNC: 137 MMOL/L (ref 136–145)
WBC: 5.4 THOU/UL (ref 4–11)

## 2019-09-17 ENCOUNTER — OFFICE VISIT - HEALTHEAST (OUTPATIENT)
Dept: ONCOLOGY | Facility: CLINIC | Age: 79
End: 2019-09-17

## 2019-09-17 DIAGNOSIS — C34.11 PRIMARY CANCER OF RIGHT UPPER LOBE OF LUNG (H): ICD-10-CM

## 2019-09-19 ENCOUNTER — OFFICE VISIT - HEALTHEAST (OUTPATIENT)
Dept: RADIATION ONCOLOGY | Facility: CLINIC | Age: 79
End: 2019-09-19

## 2019-09-19 DIAGNOSIS — C34.11 PRIMARY CANCER OF RIGHT UPPER LOBE OF LUNG (H): ICD-10-CM

## 2019-09-23 ENCOUNTER — COMMUNICATION - HEALTHEAST (OUTPATIENT)
Dept: ONCOLOGY | Facility: CLINIC | Age: 79
End: 2019-09-23

## 2019-09-23 ENCOUNTER — AMBULATORY - HEALTHEAST (OUTPATIENT)
Dept: INFUSION THERAPY | Facility: CLINIC | Age: 79
End: 2019-09-23

## 2019-09-23 ENCOUNTER — INFUSION - HEALTHEAST (OUTPATIENT)
Dept: INFUSION THERAPY | Facility: CLINIC | Age: 79
End: 2019-09-23

## 2019-09-23 ENCOUNTER — OFFICE VISIT - HEALTHEAST (OUTPATIENT)
Dept: ONCOLOGY | Facility: CLINIC | Age: 79
End: 2019-09-23

## 2019-09-23 DIAGNOSIS — C34.11 PRIMARY CANCER OF RIGHT UPPER LOBE OF LUNG (H): ICD-10-CM

## 2019-09-23 DIAGNOSIS — Z51.11 ENCOUNTER FOR ANTINEOPLASTIC CHEMOTHERAPY: ICD-10-CM

## 2019-09-23 DIAGNOSIS — N18.30 CHRONIC KIDNEY DISEASE (CKD), STAGE III (MODERATE) (H): ICD-10-CM

## 2019-09-23 LAB
ALBUMIN SERPL-MCNC: 3.2 G/DL (ref 3.5–5)
ALP SERPL-CCNC: 76 U/L (ref 45–120)
ALT SERPL W P-5'-P-CCNC: 19 U/L (ref 0–45)
ANION GAP SERPL CALCULATED.3IONS-SCNC: 5 MMOL/L (ref 5–18)
AST SERPL W P-5'-P-CCNC: 18 U/L (ref 0–40)
BASOPHILS # BLD AUTO: 0 THOU/UL (ref 0–0.2)
BASOPHILS NFR BLD AUTO: 1 % (ref 0–2)
BILIRUB SERPL-MCNC: 0.3 MG/DL (ref 0–1)
BUN SERPL-MCNC: 34 MG/DL (ref 8–28)
CALCIUM SERPL-MCNC: 9.5 MG/DL (ref 8.5–10.5)
CHLORIDE BLD-SCNC: 107 MMOL/L (ref 98–107)
CO2 SERPL-SCNC: 23 MMOL/L (ref 22–31)
CREAT SERPL-MCNC: 1.63 MG/DL (ref 0.7–1.3)
EOSINOPHIL # BLD AUTO: 0.2 THOU/UL (ref 0–0.4)
EOSINOPHIL NFR BLD AUTO: 4 % (ref 0–6)
ERYTHROCYTE [DISTWIDTH] IN BLOOD BY AUTOMATED COUNT: 14.9 % (ref 11–14.5)
GFR SERPL CREATININE-BSD FRML MDRD: 41 ML/MIN/1.73M2
GLUCOSE BLD-MCNC: 113 MG/DL (ref 70–125)
HCT VFR BLD AUTO: 29.6 % (ref 40–54)
HGB BLD-MCNC: 9.5 G/DL (ref 14–18)
LYMPHOCYTES # BLD AUTO: 0.6 THOU/UL (ref 0.8–4.4)
LYMPHOCYTES NFR BLD AUTO: 14 % (ref 20–40)
MCH RBC QN AUTO: 29.1 PG (ref 27–34)
MCHC RBC AUTO-ENTMCNC: 32.1 G/DL (ref 32–36)
MCV RBC AUTO: 91 FL (ref 80–100)
MONOCYTES # BLD AUTO: 0.4 THOU/UL (ref 0–0.9)
MONOCYTES NFR BLD AUTO: 9 % (ref 2–10)
NEUTROPHILS # BLD AUTO: 3.1 THOU/UL (ref 2–7.7)
NEUTROPHILS NFR BLD AUTO: 72 % (ref 50–70)
PLATELET # BLD AUTO: 206 THOU/UL (ref 140–440)
PMV BLD AUTO: 10 FL (ref 8.5–12.5)
POTASSIUM BLD-SCNC: 4.6 MMOL/L (ref 3.5–5)
PROT SERPL-MCNC: 6.7 G/DL (ref 6–8)
RBC # BLD AUTO: 3.26 MILL/UL (ref 4.4–6.2)
SODIUM SERPL-SCNC: 135 MMOL/L (ref 136–145)
WBC: 4.3 THOU/UL (ref 4–11)

## 2019-09-26 ENCOUNTER — OFFICE VISIT - HEALTHEAST (OUTPATIENT)
Dept: RADIATION ONCOLOGY | Facility: CLINIC | Age: 79
End: 2019-09-26

## 2019-09-26 DIAGNOSIS — C34.11 PRIMARY CANCER OF RIGHT UPPER LOBE OF LUNG (H): ICD-10-CM

## 2019-09-30 ENCOUNTER — INFUSION - HEALTHEAST (OUTPATIENT)
Dept: INFUSION THERAPY | Facility: CLINIC | Age: 79
End: 2019-09-30

## 2019-09-30 ENCOUNTER — AMBULATORY - HEALTHEAST (OUTPATIENT)
Dept: INFUSION THERAPY | Facility: CLINIC | Age: 79
End: 2019-09-30

## 2019-09-30 ENCOUNTER — OFFICE VISIT - HEALTHEAST (OUTPATIENT)
Dept: ONCOLOGY | Facility: CLINIC | Age: 79
End: 2019-09-30

## 2019-09-30 DIAGNOSIS — C34.11 PRIMARY CANCER OF RIGHT UPPER LOBE OF LUNG (H): ICD-10-CM

## 2019-09-30 DIAGNOSIS — Z51.11 ENCOUNTER FOR ANTINEOPLASTIC CHEMOTHERAPY: ICD-10-CM

## 2019-09-30 DIAGNOSIS — K20.80 RADIATION ESOPHAGITIS: ICD-10-CM

## 2019-09-30 DIAGNOSIS — R13.14 PHARYNGOESOPHAGEAL DYSPHAGIA: ICD-10-CM

## 2019-09-30 DIAGNOSIS — T66.XXXA RADIATION ESOPHAGITIS: ICD-10-CM

## 2019-09-30 LAB
ACID FAST STN SPEC QL: NORMAL
ALBUMIN SERPL-MCNC: 3.2 G/DL (ref 3.5–5)
ALP SERPL-CCNC: 81 U/L (ref 45–120)
ALT SERPL W P-5'-P-CCNC: 12 U/L (ref 0–45)
ANION GAP SERPL CALCULATED.3IONS-SCNC: 8 MMOL/L (ref 5–18)
AST SERPL W P-5'-P-CCNC: 12 U/L (ref 0–40)
BACTERIA SPEC CULT: NORMAL
BASOPHILS # BLD AUTO: 0 THOU/UL (ref 0–0.2)
BASOPHILS NFR BLD AUTO: 1 % (ref 0–2)
BILIRUB SERPL-MCNC: 0.2 MG/DL (ref 0–1)
BUN SERPL-MCNC: 39 MG/DL (ref 8–28)
CALCIUM SERPL-MCNC: 9.5 MG/DL (ref 8.5–10.5)
CHLORIDE BLD-SCNC: 106 MMOL/L (ref 98–107)
CO2 SERPL-SCNC: 23 MMOL/L (ref 22–31)
CREAT SERPL-MCNC: 1.63 MG/DL (ref 0.7–1.3)
EOSINOPHIL # BLD AUTO: 0.1 THOU/UL (ref 0–0.4)
EOSINOPHIL NFR BLD AUTO: 2 % (ref 0–6)
ERYTHROCYTE [DISTWIDTH] IN BLOOD BY AUTOMATED COUNT: 15.3 % (ref 11–14.5)
GFR SERPL CREATININE-BSD FRML MDRD: 41 ML/MIN/1.73M2
GLUCOSE BLD-MCNC: 143 MG/DL (ref 70–125)
HCT VFR BLD AUTO: 30.1 % (ref 40–54)
HGB BLD-MCNC: 9.6 G/DL (ref 14–18)
LYMPHOCYTES # BLD AUTO: 0.4 THOU/UL (ref 0.8–4.4)
LYMPHOCYTES NFR BLD AUTO: 11 % (ref 20–40)
MCH RBC QN AUTO: 29.4 PG (ref 27–34)
MCHC RBC AUTO-ENTMCNC: 31.9 G/DL (ref 32–36)
MCV RBC AUTO: 92 FL (ref 80–100)
MONOCYTES # BLD AUTO: 0.3 THOU/UL (ref 0–0.9)
MONOCYTES NFR BLD AUTO: 8 % (ref 2–10)
NEUTROPHILS # BLD AUTO: 3 THOU/UL (ref 2–7.7)
NEUTROPHILS NFR BLD AUTO: 78 % (ref 50–70)
PLATELET # BLD AUTO: 187 THOU/UL (ref 140–440)
PMV BLD AUTO: 9.6 FL (ref 8.5–12.5)
POTASSIUM BLD-SCNC: 4.2 MMOL/L (ref 3.5–5)
PROT SERPL-MCNC: 6.7 G/DL (ref 6–8)
RBC # BLD AUTO: 3.27 MILL/UL (ref 4.4–6.2)
SODIUM SERPL-SCNC: 137 MMOL/L (ref 136–145)
WBC: 3.8 THOU/UL (ref 4–11)

## 2019-10-01 ENCOUNTER — AMBULATORY - HEALTHEAST (OUTPATIENT)
Dept: RADIATION ONCOLOGY | Facility: HOSPITAL | Age: 79
End: 2019-10-01

## 2019-10-03 ENCOUNTER — RECORDS - HEALTHEAST (OUTPATIENT)
Dept: ADMINISTRATIVE | Facility: OTHER | Age: 79
End: 2019-10-03

## 2019-10-03 ENCOUNTER — AMBULATORY - HEALTHEAST (OUTPATIENT)
Dept: ONCOLOGY | Facility: CLINIC | Age: 79
End: 2019-10-03

## 2019-10-03 ENCOUNTER — OFFICE VISIT - HEALTHEAST (OUTPATIENT)
Dept: RADIATION ONCOLOGY | Facility: CLINIC | Age: 79
End: 2019-10-03

## 2019-10-03 DIAGNOSIS — C34.11 PRIMARY CANCER OF RIGHT UPPER LOBE OF LUNG (H): ICD-10-CM

## 2019-10-07 ENCOUNTER — AMBULATORY - HEALTHEAST (OUTPATIENT)
Dept: INFUSION THERAPY | Facility: CLINIC | Age: 79
End: 2019-10-07

## 2019-10-07 ENCOUNTER — OFFICE VISIT - HEALTHEAST (OUTPATIENT)
Dept: ONCOLOGY | Facility: CLINIC | Age: 79
End: 2019-10-07

## 2019-10-07 ENCOUNTER — INFUSION - HEALTHEAST (OUTPATIENT)
Dept: INFUSION THERAPY | Facility: CLINIC | Age: 79
End: 2019-10-07

## 2019-10-07 DIAGNOSIS — Z51.11 ENCOUNTER FOR ANTINEOPLASTIC CHEMOTHERAPY: ICD-10-CM

## 2019-10-07 DIAGNOSIS — C34.11 PRIMARY CANCER OF RIGHT UPPER LOBE OF LUNG (H): ICD-10-CM

## 2019-10-07 DIAGNOSIS — N18.30 CHRONIC KIDNEY DISEASE (CKD), STAGE III (MODERATE) (H): ICD-10-CM

## 2019-10-07 DIAGNOSIS — D64.9 NORMOCHROMIC NORMOCYTIC ANEMIA: ICD-10-CM

## 2019-10-07 DIAGNOSIS — K20.80 RADIATION ESOPHAGITIS: ICD-10-CM

## 2019-10-07 DIAGNOSIS — T66.XXXA RADIATION ESOPHAGITIS: ICD-10-CM

## 2019-10-07 DIAGNOSIS — R13.14 PHARYNGOESOPHAGEAL DYSPHAGIA: ICD-10-CM

## 2019-10-07 LAB
ALBUMIN SERPL-MCNC: 3.2 G/DL (ref 3.5–5)
ALP SERPL-CCNC: 64 U/L (ref 45–120)
ALT SERPL W P-5'-P-CCNC: 10 U/L (ref 0–45)
ANION GAP SERPL CALCULATED.3IONS-SCNC: 11 MMOL/L (ref 5–18)
AST SERPL W P-5'-P-CCNC: 12 U/L (ref 0–40)
BASOPHILS # BLD AUTO: 0 THOU/UL (ref 0–0.2)
BASOPHILS NFR BLD AUTO: 1 % (ref 0–2)
BILIRUB SERPL-MCNC: 0.3 MG/DL (ref 0–1)
BUN SERPL-MCNC: 30 MG/DL (ref 8–28)
CALCIUM SERPL-MCNC: 9.3 MG/DL (ref 8.5–10.5)
CHLORIDE BLD-SCNC: 106 MMOL/L (ref 98–107)
CO2 SERPL-SCNC: 21 MMOL/L (ref 22–31)
CREAT SERPL-MCNC: 1.53 MG/DL (ref 0.7–1.3)
EOSINOPHIL # BLD AUTO: 0.1 THOU/UL (ref 0–0.4)
EOSINOPHIL NFR BLD AUTO: 3 % (ref 0–6)
ERYTHROCYTE [DISTWIDTH] IN BLOOD BY AUTOMATED COUNT: 16.1 % (ref 11–14.5)
GFR SERPL CREATININE-BSD FRML MDRD: 44 ML/MIN/1.73M2
GLUCOSE BLD-MCNC: 142 MG/DL (ref 70–125)
HCT VFR BLD AUTO: 28.3 % (ref 40–54)
HGB BLD-MCNC: 9.1 G/DL (ref 14–18)
LYMPHOCYTES # BLD AUTO: 0.4 THOU/UL (ref 0.8–4.4)
LYMPHOCYTES NFR BLD AUTO: 13 % (ref 20–40)
MCH RBC QN AUTO: 29.5 PG (ref 27–34)
MCHC RBC AUTO-ENTMCNC: 32.2 G/DL (ref 32–36)
MCV RBC AUTO: 92 FL (ref 80–100)
MONOCYTES # BLD AUTO: 0.3 THOU/UL (ref 0–0.9)
MONOCYTES NFR BLD AUTO: 9 % (ref 2–10)
NEUTROPHILS # BLD AUTO: 2.4 THOU/UL (ref 2–7.7)
NEUTROPHILS NFR BLD AUTO: 74 % (ref 50–70)
PLATELET # BLD AUTO: 146 THOU/UL (ref 140–440)
PMV BLD AUTO: 9.9 FL (ref 8.5–12.5)
POTASSIUM BLD-SCNC: 4.5 MMOL/L (ref 3.5–5)
PROT SERPL-MCNC: 6.4 G/DL (ref 6–8)
RBC # BLD AUTO: 3.08 MILL/UL (ref 4.4–6.2)
SODIUM SERPL-SCNC: 138 MMOL/L (ref 136–145)
WBC: 3.3 THOU/UL (ref 4–11)

## 2019-10-09 ENCOUNTER — AMBULATORY - HEALTHEAST (OUTPATIENT)
Dept: RADIATION ONCOLOGY | Facility: CLINIC | Age: 79
End: 2019-10-09

## 2019-10-10 ENCOUNTER — OFFICE VISIT - HEALTHEAST (OUTPATIENT)
Dept: RADIATION ONCOLOGY | Facility: CLINIC | Age: 79
End: 2019-10-10

## 2019-10-10 DIAGNOSIS — C34.11 PRIMARY CANCER OF RIGHT UPPER LOBE OF LUNG (H): ICD-10-CM

## 2019-10-14 ENCOUNTER — AMBULATORY - HEALTHEAST (OUTPATIENT)
Dept: INFUSION THERAPY | Facility: CLINIC | Age: 79
End: 2019-10-14

## 2019-10-14 ENCOUNTER — OFFICE VISIT - HEALTHEAST (OUTPATIENT)
Dept: ONCOLOGY | Facility: CLINIC | Age: 79
End: 2019-10-14

## 2019-10-14 ENCOUNTER — INFUSION - HEALTHEAST (OUTPATIENT)
Dept: INFUSION THERAPY | Facility: CLINIC | Age: 79
End: 2019-10-14

## 2019-10-14 DIAGNOSIS — T66.XXXA RADIATION ESOPHAGITIS: ICD-10-CM

## 2019-10-14 DIAGNOSIS — C34.11 PRIMARY CANCER OF RIGHT UPPER LOBE OF LUNG (H): ICD-10-CM

## 2019-10-14 DIAGNOSIS — K20.80 RADIATION ESOPHAGITIS: ICD-10-CM

## 2019-10-14 DIAGNOSIS — Z51.11 ENCOUNTER FOR ANTINEOPLASTIC CHEMOTHERAPY: ICD-10-CM

## 2019-10-14 DIAGNOSIS — N18.30 CHRONIC KIDNEY DISEASE (CKD), STAGE III (MODERATE) (H): ICD-10-CM

## 2019-10-14 LAB
ALBUMIN SERPL-MCNC: 3.2 G/DL (ref 3.5–5)
ALP SERPL-CCNC: 69 U/L (ref 45–120)
ALT SERPL W P-5'-P-CCNC: 36 U/L (ref 0–45)
ANION GAP SERPL CALCULATED.3IONS-SCNC: 6 MMOL/L (ref 5–18)
AST SERPL W P-5'-P-CCNC: 30 U/L (ref 0–40)
BASOPHILS # BLD AUTO: 0 THOU/UL (ref 0–0.2)
BASOPHILS NFR BLD AUTO: 0 % (ref 0–2)
BILIRUB SERPL-MCNC: 0.2 MG/DL (ref 0–1)
BUN SERPL-MCNC: 27 MG/DL (ref 8–28)
CALCIUM SERPL-MCNC: 9.2 MG/DL (ref 8.5–10.5)
CHLORIDE BLD-SCNC: 108 MMOL/L (ref 98–107)
CO2 SERPL-SCNC: 23 MMOL/L (ref 22–31)
CREAT SERPL-MCNC: 1.5 MG/DL (ref 0.7–1.3)
EOSINOPHIL # BLD AUTO: 0.1 THOU/UL (ref 0–0.4)
EOSINOPHIL NFR BLD AUTO: 4 % (ref 0–6)
ERYTHROCYTE [DISTWIDTH] IN BLOOD BY AUTOMATED COUNT: 17.1 % (ref 11–14.5)
GFR SERPL CREATININE-BSD FRML MDRD: 45 ML/MIN/1.73M2
GLUCOSE BLD-MCNC: 100 MG/DL (ref 70–125)
HCT VFR BLD AUTO: 26.5 % (ref 40–54)
HGB BLD-MCNC: 8.6 G/DL (ref 14–18)
LYMPHOCYTES # BLD AUTO: 0.4 THOU/UL (ref 0.8–4.4)
LYMPHOCYTES NFR BLD AUTO: 15 % (ref 20–40)
MCH RBC QN AUTO: 30.1 PG (ref 27–34)
MCHC RBC AUTO-ENTMCNC: 32.5 G/DL (ref 32–36)
MCV RBC AUTO: 93 FL (ref 80–100)
MONOCYTES # BLD AUTO: 0.3 THOU/UL (ref 0–0.9)
MONOCYTES NFR BLD AUTO: 13 % (ref 2–10)
NEUTROPHILS # BLD AUTO: 1.6 THOU/UL (ref 2–7.7)
NEUTROPHILS NFR BLD AUTO: 65 % (ref 50–70)
PLATELET # BLD AUTO: 138 THOU/UL (ref 140–440)
PMV BLD AUTO: 9.8 FL (ref 8.5–12.5)
POTASSIUM BLD-SCNC: 4.6 MMOL/L (ref 3.5–5)
PROT SERPL-MCNC: 6.1 G/DL (ref 6–8)
RBC # BLD AUTO: 2.86 MILL/UL (ref 4.4–6.2)
SODIUM SERPL-SCNC: 137 MMOL/L (ref 136–145)
WBC: 2.4 THOU/UL (ref 4–11)

## 2019-10-17 ENCOUNTER — COMMUNICATION - HEALTHEAST (OUTPATIENT)
Dept: ONCOLOGY | Facility: CLINIC | Age: 79
End: 2019-10-17

## 2019-10-17 ENCOUNTER — OFFICE VISIT - HEALTHEAST (OUTPATIENT)
Dept: RADIATION ONCOLOGY | Facility: CLINIC | Age: 79
End: 2019-10-17

## 2019-10-17 DIAGNOSIS — C34.11 PRIMARY CANCER OF RIGHT UPPER LOBE OF LUNG (H): ICD-10-CM

## 2019-10-17 DIAGNOSIS — R13.14 PHARYNGOESOPHAGEAL DYSPHAGIA: ICD-10-CM

## 2019-10-17 DIAGNOSIS — K20.80 RADIATION ESOPHAGITIS: ICD-10-CM

## 2019-10-17 DIAGNOSIS — T66.XXXA RADIATION ESOPHAGITIS: ICD-10-CM

## 2019-10-18 ENCOUNTER — COMMUNICATION - HEALTHEAST (OUTPATIENT)
Dept: RADIATION ONCOLOGY | Facility: CLINIC | Age: 79
End: 2019-10-18

## 2019-10-18 ENCOUNTER — AMBULATORY - HEALTHEAST (OUTPATIENT)
Dept: RADIATION ONCOLOGY | Facility: CLINIC | Age: 79
End: 2019-10-18

## 2019-10-21 ENCOUNTER — OFFICE VISIT - HEALTHEAST (OUTPATIENT)
Dept: ONCOLOGY | Facility: CLINIC | Age: 79
End: 2019-10-21

## 2019-10-21 ENCOUNTER — AMBULATORY - HEALTHEAST (OUTPATIENT)
Dept: INFUSION THERAPY | Facility: CLINIC | Age: 79
End: 2019-10-21

## 2019-10-21 ENCOUNTER — INFUSION - HEALTHEAST (OUTPATIENT)
Dept: INFUSION THERAPY | Facility: CLINIC | Age: 79
End: 2019-10-21

## 2019-10-21 DIAGNOSIS — D64.9 NORMOCHROMIC NORMOCYTIC ANEMIA: ICD-10-CM

## 2019-10-21 DIAGNOSIS — C34.11 PRIMARY CANCER OF RIGHT UPPER LOBE OF LUNG (H): ICD-10-CM

## 2019-10-21 DIAGNOSIS — Z51.11 ENCOUNTER FOR ANTINEOPLASTIC CHEMOTHERAPY: ICD-10-CM

## 2019-10-21 DIAGNOSIS — R13.14 PHARYNGOESOPHAGEAL DYSPHAGIA: ICD-10-CM

## 2019-10-21 DIAGNOSIS — K20.80 RADIATION ESOPHAGITIS: ICD-10-CM

## 2019-10-21 DIAGNOSIS — T66.XXXA RADIATION ESOPHAGITIS: ICD-10-CM

## 2019-10-21 LAB
ALBUMIN SERPL-MCNC: 3.4 G/DL (ref 3.5–5)
ALP SERPL-CCNC: 62 U/L (ref 45–120)
ALT SERPL W P-5'-P-CCNC: 50 U/L (ref 0–45)
ANION GAP SERPL CALCULATED.3IONS-SCNC: 8 MMOL/L (ref 5–18)
AST SERPL W P-5'-P-CCNC: 38 U/L (ref 0–40)
BASOPHILS # BLD AUTO: 0 THOU/UL (ref 0–0.2)
BASOPHILS NFR BLD AUTO: 1 % (ref 0–2)
BILIRUB SERPL-MCNC: 0.4 MG/DL (ref 0–1)
BUN SERPL-MCNC: 30 MG/DL (ref 8–28)
CALCIUM SERPL-MCNC: 8.4 MG/DL (ref 8.5–10.5)
CHLORIDE BLD-SCNC: 109 MMOL/L (ref 98–107)
CO2 SERPL-SCNC: 22 MMOL/L (ref 22–31)
CREAT SERPL-MCNC: 1.68 MG/DL (ref 0.7–1.3)
EOSINOPHIL # BLD AUTO: 0.1 THOU/UL (ref 0–0.4)
EOSINOPHIL NFR BLD AUTO: 5 % (ref 0–6)
ERYTHROCYTE [DISTWIDTH] IN BLOOD BY AUTOMATED COUNT: 18.4 % (ref 11–14.5)
GFR SERPL CREATININE-BSD FRML MDRD: 40 ML/MIN/1.73M2
GLUCOSE BLD-MCNC: 105 MG/DL (ref 70–125)
HCT VFR BLD AUTO: 26 % (ref 40–54)
HGB BLD-MCNC: 8.4 G/DL (ref 14–18)
LYMPHOCYTES # BLD AUTO: 0.4 THOU/UL (ref 0.8–4.4)
LYMPHOCYTES NFR BLD AUTO: 17 % (ref 20–40)
MCH RBC QN AUTO: 30.1 PG (ref 27–34)
MCHC RBC AUTO-ENTMCNC: 32.3 G/DL (ref 32–36)
MCV RBC AUTO: 93 FL (ref 80–100)
MONOCYTES # BLD AUTO: 0.3 THOU/UL (ref 0–0.9)
MONOCYTES NFR BLD AUTO: 13 % (ref 2–10)
NEUTROPHILS # BLD AUTO: 1.3 THOU/UL (ref 2–7.7)
NEUTROPHILS NFR BLD AUTO: 63 % (ref 50–70)
PLATELET # BLD AUTO: 149 THOU/UL (ref 140–440)
PMV BLD AUTO: 9.4 FL (ref 8.5–12.5)
POTASSIUM BLD-SCNC: 4.6 MMOL/L (ref 3.5–5)
PROT SERPL-MCNC: 6.1 G/DL (ref 6–8)
RBC # BLD AUTO: 2.79 MILL/UL (ref 4.4–6.2)
SODIUM SERPL-SCNC: 139 MMOL/L (ref 136–145)
WBC: 2.1 THOU/UL (ref 4–11)

## 2020-01-01 ENCOUNTER — OFFICE VISIT - HEALTHEAST (OUTPATIENT)
Dept: FAMILY MEDICINE | Facility: CLINIC | Age: 80
End: 2020-01-01

## 2020-01-01 ENCOUNTER — HOSPITAL ENCOUNTER (OUTPATIENT)
Dept: PET IMAGING | Facility: HOSPITAL | Age: 80
Setting detail: RADIATION/ONCOLOGY SERIES
Discharge: STILL ADMITTED OR ADMINISTRATIVE | End: 2020-06-30
Attending: INTERNAL MEDICINE

## 2020-01-01 ENCOUNTER — AMBULATORY - HEALTHEAST (OUTPATIENT)
Dept: INFUSION THERAPY | Facility: CLINIC | Age: 80
End: 2020-01-01

## 2020-01-01 ENCOUNTER — OFFICE VISIT - HEALTHEAST (OUTPATIENT)
Dept: ONCOLOGY | Facility: CLINIC | Age: 80
End: 2020-01-01

## 2020-01-01 ENCOUNTER — HOSPITAL ENCOUNTER (OUTPATIENT)
Dept: ULTRASOUND IMAGING | Facility: CLINIC | Age: 80
Setting detail: RADIATION/ONCOLOGY SERIES
Discharge: STILL ADMITTED OR ADMINISTRATIVE | End: 2020-05-07
Attending: INTERNAL MEDICINE

## 2020-01-01 ENCOUNTER — AMBULATORY - HEALTHEAST (OUTPATIENT)
Dept: FAMILY MEDICINE | Facility: CLINIC | Age: 80
End: 2020-01-01

## 2020-01-01 ENCOUNTER — COMMUNICATION - HEALTHEAST (OUTPATIENT)
Dept: ONCOLOGY | Facility: CLINIC | Age: 80
End: 2020-01-01

## 2020-01-01 ENCOUNTER — HOSPITAL ENCOUNTER (OUTPATIENT)
Dept: PET IMAGING | Facility: HOSPITAL | Age: 80
Setting detail: RADIATION/ONCOLOGY SERIES
Discharge: STILL ADMITTED OR ADMINISTRATIVE | End: 2020-04-24
Attending: INTERNAL MEDICINE

## 2020-01-01 ENCOUNTER — AMBULATORY - HEALTHEAST (OUTPATIENT)
Dept: PULMONOLOGY | Facility: OTHER | Age: 80
End: 2020-01-01

## 2020-01-01 ENCOUNTER — RECORDS - HEALTHEAST (OUTPATIENT)
Dept: ADMINISTRATIVE | Facility: OTHER | Age: 80
End: 2020-01-01

## 2020-01-01 ENCOUNTER — AMBULATORY - HEALTHEAST (OUTPATIENT)
Dept: INTERVENTIONAL RADIOLOGY/VASCULAR | Facility: HOSPITAL | Age: 80
End: 2020-01-01

## 2020-01-01 ENCOUNTER — INFUSION - HEALTHEAST (OUTPATIENT)
Dept: INFUSION THERAPY | Facility: CLINIC | Age: 80
End: 2020-01-01

## 2020-01-01 ENCOUNTER — AMBULATORY - HEALTHEAST (OUTPATIENT)
Dept: LAB | Facility: CLINIC | Age: 80
End: 2020-01-01

## 2020-01-01 ENCOUNTER — OFFICE VISIT - HEALTHEAST (OUTPATIENT)
Dept: SURGERY | Facility: CLINIC | Age: 80
End: 2020-01-01

## 2020-01-01 ENCOUNTER — COMMUNICATION - HEALTHEAST (OUTPATIENT)
Dept: FAMILY MEDICINE | Facility: CLINIC | Age: 80
End: 2020-01-01

## 2020-01-01 ENCOUNTER — AMBULATORY - HEALTHEAST (OUTPATIENT)
Dept: ONCOLOGY | Facility: CLINIC | Age: 80
End: 2020-01-01

## 2020-01-01 ENCOUNTER — COMMUNICATION - HEALTHEAST (OUTPATIENT)
Dept: ADMINISTRATIVE | Facility: HOSPITAL | Age: 80
End: 2020-01-01

## 2020-01-01 ENCOUNTER — HOSPITAL ENCOUNTER (OUTPATIENT)
Dept: CT IMAGING | Facility: CLINIC | Age: 80
Setting detail: RADIATION/ONCOLOGY SERIES
Discharge: STILL ADMITTED OR ADMINISTRATIVE | End: 2020-08-27
Attending: INTERNAL MEDICINE

## 2020-01-01 ENCOUNTER — COMMUNICATION - HEALTHEAST (OUTPATIENT)
Dept: SCHEDULING | Facility: CLINIC | Age: 80
End: 2020-01-01

## 2020-01-01 ENCOUNTER — AMBULATORY - HEALTHEAST (OUTPATIENT)
Dept: OTHER | Facility: CLINIC | Age: 80
End: 2020-01-01

## 2020-01-01 ENCOUNTER — TRANSFERRED RECORDS (OUTPATIENT)
Dept: HEALTH INFORMATION MANAGEMENT | Facility: CLINIC | Age: 80
End: 2020-01-01

## 2020-01-01 ENCOUNTER — TELEPHONE (OUTPATIENT)
Facility: CLINIC | Age: 80
End: 2020-01-01

## 2020-01-01 ENCOUNTER — COMMUNICATION - HEALTHEAST (OUTPATIENT)
Dept: PULMONOLOGY | Facility: OTHER | Age: 80
End: 2020-01-01

## 2020-01-01 ENCOUNTER — HOSPITAL ENCOUNTER (INPATIENT)
Facility: CLINIC | Age: 80
LOS: 2 days | DRG: 871 | End: 2020-10-28
Attending: INTERNAL MEDICINE | Admitting: INTERNAL MEDICINE
Payer: COMMERCIAL

## 2020-01-01 ENCOUNTER — OFFICE VISIT - HEALTHEAST (OUTPATIENT)
Dept: PULMONOLOGY | Facility: OTHER | Age: 80
End: 2020-01-01

## 2020-01-01 ENCOUNTER — TRANSCRIBE ORDERS (OUTPATIENT)
Dept: OTHER | Age: 80
End: 2020-01-01

## 2020-01-01 ENCOUNTER — HOSPITAL ENCOUNTER (OUTPATIENT)
Dept: INTERVENTIONAL RADIOLOGY/VASCULAR | Facility: HOSPITAL | Age: 80
Setting detail: RADIATION/ONCOLOGY SERIES
Discharge: STILL ADMITTED OR ADMINISTRATIVE | End: 2020-09-28
Attending: INTERNAL MEDICINE

## 2020-01-01 ENCOUNTER — HOSPITAL ENCOUNTER (OUTPATIENT)
Dept: INTERVENTIONAL RADIOLOGY/VASCULAR | Facility: HOSPITAL | Age: 80
Setting detail: RADIATION/ONCOLOGY SERIES
Discharge: STILL ADMITTED OR ADMINISTRATIVE | End: 2020-07-09
Attending: INTERNAL MEDICINE

## 2020-01-01 ENCOUNTER — HOSPITAL ENCOUNTER (OUTPATIENT)
Dept: PET IMAGING | Facility: HOSPITAL | Age: 80
Setting detail: RADIATION/ONCOLOGY SERIES
Discharge: STILL ADMITTED OR ADMINISTRATIVE | End: 2020-01-22
Attending: INTERNAL MEDICINE

## 2020-01-01 VITALS
TEMPERATURE: 95.1 F | RESPIRATION RATE: 28 BRPM | WEIGHT: 129 LBS | BODY MASS INDEX: 22.02 KG/M2 | HEIGHT: 64 IN | OXYGEN SATURATION: 97 % | SYSTOLIC BLOOD PRESSURE: 63 MMHG | HEART RATE: 105 BPM | DIASTOLIC BLOOD PRESSURE: 35 MMHG

## 2020-01-01 DIAGNOSIS — C34.11 PRIMARY CANCER OF RIGHT UPPER LOBE OF LUNG (H): ICD-10-CM

## 2020-01-01 DIAGNOSIS — Z51.11 ENCOUNTER FOR ANTINEOPLASTIC CHEMOTHERAPY: ICD-10-CM

## 2020-01-01 DIAGNOSIS — Z51.12 ENCOUNTER FOR ANTINEOPLASTIC IMMUNOTHERAPY: ICD-10-CM

## 2020-01-01 DIAGNOSIS — J91.0 MALIGNANT PLEURAL EFFUSION (H): ICD-10-CM

## 2020-01-01 DIAGNOSIS — G89.3 CANCER ASSOCIATED PAIN: ICD-10-CM

## 2020-01-01 DIAGNOSIS — J90 PLEURAL EFFUSION: ICD-10-CM

## 2020-01-01 DIAGNOSIS — N18.30 CHRONIC KIDNEY DISEASE (CKD), STAGE III (MODERATE) (H): ICD-10-CM

## 2020-01-01 DIAGNOSIS — C34.11 PRIMARY CANCER OF RIGHT UPPER LOBE OF LUNG (H): Primary | ICD-10-CM

## 2020-01-01 DIAGNOSIS — K40.90 NON-RECURRENT UNILATERAL INGUINAL HERNIA WITHOUT OBSTRUCTION OR GANGRENE: ICD-10-CM

## 2020-01-01 DIAGNOSIS — T40.2X5A CONSTIPATION DUE TO OPIOID THERAPY: ICD-10-CM

## 2020-01-01 DIAGNOSIS — R09.82 PND (POST-NASAL DRIP): ICD-10-CM

## 2020-01-01 DIAGNOSIS — J18.9 PNEUMONIA DUE TO INFECTIOUS ORGANISM, UNSPECIFIED LATERALITY, UNSPECIFIED PART OF LUNG: ICD-10-CM

## 2020-01-01 DIAGNOSIS — N18.31 STAGE 3A CHRONIC KIDNEY DISEASE (H): ICD-10-CM

## 2020-01-01 DIAGNOSIS — Z11.59 ENCOUNTER FOR SCREENING FOR OTHER VIRAL DISEASES: ICD-10-CM

## 2020-01-01 DIAGNOSIS — K40.90 RIGHT INGUINAL HERNIA: ICD-10-CM

## 2020-01-01 DIAGNOSIS — N18.4 CHRONIC KIDNEY DISEASE, STAGE IV (SEVERE) (H): ICD-10-CM

## 2020-01-01 DIAGNOSIS — Z00.01 ENCOUNTER FOR GENERAL ADULT MEDICAL EXAMINATION WITH ABNORMAL FINDINGS: ICD-10-CM

## 2020-01-01 DIAGNOSIS — J44.1 COPD EXACERBATION (H): ICD-10-CM

## 2020-01-01 DIAGNOSIS — I10 ESSENTIAL HYPERTENSION: ICD-10-CM

## 2020-01-01 DIAGNOSIS — K59.03 CONSTIPATION DUE TO OPIOID THERAPY: ICD-10-CM

## 2020-01-01 DIAGNOSIS — J44.9 CHRONIC OBSTRUCTIVE PULMONARY DISEASE, UNSPECIFIED COPD TYPE (H): ICD-10-CM

## 2020-01-01 DIAGNOSIS — Z09 HOSPITAL DISCHARGE FOLLOW-UP: ICD-10-CM

## 2020-01-01 DIAGNOSIS — D64.9 NORMOCHROMIC NORMOCYTIC ANEMIA: ICD-10-CM

## 2020-01-01 DIAGNOSIS — N18.30 CHRONIC KIDNEY DISEASE, STAGE III (MODERATE) (H): ICD-10-CM

## 2020-01-01 DIAGNOSIS — I73.9 PERIPHERAL VASCULAR DISEASE (H): ICD-10-CM

## 2020-01-01 DIAGNOSIS — J44.9 COPD (CHRONIC OBSTRUCTIVE PULMONARY DISEASE) (H): ICD-10-CM

## 2020-01-01 LAB
25(OH)D3 SERPL-MCNC: 29.6 NG/ML (ref 30–80)
25(OH)D3 SERPL-MCNC: 36.2 NG/ML (ref 30–80)
25(OH)D3 SERPL-MCNC: 38.2 NG/ML (ref 30–80)
ALBUMIN SERPL-MCNC: 1.5 G/DL (ref 3.4–5)
ALBUMIN SERPL-MCNC: 3 G/DL (ref 3.5–5)
ALBUMIN SERPL-MCNC: 3.2 G/DL (ref 3.5–5)
ALBUMIN SERPL-MCNC: 3.3 G/DL (ref 3.5–5)
ALBUMIN SERPL-MCNC: 3.3 G/DL (ref 3.5–5)
ALBUMIN SERPL-MCNC: 3.4 G/DL (ref 3.5–5)
ALBUMIN SERPL-MCNC: 3.5 G/DL (ref 3.5–5)
ALBUMIN UR-MCNC: ABNORMAL MG/DL
ALP SERPL-CCNC: 75 U/L (ref 45–120)
ALP SERPL-CCNC: 76 U/L (ref 45–120)
ALP SERPL-CCNC: 77 U/L (ref 45–120)
ALP SERPL-CCNC: 78 U/L (ref 45–120)
ALP SERPL-CCNC: 78 U/L (ref 45–120)
ALP SERPL-CCNC: 81 U/L (ref 45–120)
ALP SERPL-CCNC: 84 U/L (ref 45–120)
ALP SERPL-CCNC: 85 U/L (ref 45–120)
ALP SERPL-CCNC: 86 U/L (ref 40–150)
ALP SERPL-CCNC: 86 U/L (ref 45–120)
ALP SERPL-CCNC: 86 U/L (ref 45–120)
ALP SERPL-CCNC: 87 U/L (ref 45–120)
ALP SERPL-CCNC: 87 U/L (ref 45–120)
ALP SERPL-CCNC: 90 U/L (ref 45–120)
ALT SERPL W P-5'-P-CCNC: 10 U/L (ref 0–45)
ALT SERPL W P-5'-P-CCNC: 10 U/L (ref 0–45)
ALT SERPL W P-5'-P-CCNC: 11 U/L (ref 0–45)
ALT SERPL W P-5'-P-CCNC: 11 U/L (ref 0–45)
ALT SERPL W P-5'-P-CCNC: 13 U/L (ref 0–45)
ALT SERPL W P-5'-P-CCNC: 14 U/L (ref 0–45)
ALT SERPL W P-5'-P-CCNC: 14 U/L (ref 0–70)
ALT SERPL W P-5'-P-CCNC: 25 U/L (ref 0–45)
ALT SERPL W P-5'-P-CCNC: <9 U/L (ref 0–45)
ANION GAP SERPL CALCULATED.3IONS-SCNC: 10 MMOL/L (ref 5–18)
ANION GAP SERPL CALCULATED.3IONS-SCNC: 10 MMOL/L (ref 5–18)
ANION GAP SERPL CALCULATED.3IONS-SCNC: 11 MMOL/L (ref 3–14)
ANION GAP SERPL CALCULATED.3IONS-SCNC: 11 MMOL/L (ref 5–18)
ANION GAP SERPL CALCULATED.3IONS-SCNC: 12 MMOL/L (ref 5–18)
ANION GAP SERPL CALCULATED.3IONS-SCNC: 14 MMOL/L (ref 3–14)
ANION GAP SERPL CALCULATED.3IONS-SCNC: 15 MMOL/L (ref 3–14)
ANION GAP SERPL CALCULATED.3IONS-SCNC: 6 MMOL/L (ref 5–18)
ANION GAP SERPL CALCULATED.3IONS-SCNC: 7 MMOL/L (ref 5–18)
ANION GAP SERPL CALCULATED.3IONS-SCNC: 8 MMOL/L (ref 5–18)
ANION GAP SERPL CALCULATED.3IONS-SCNC: 9 MMOL/L (ref 5–18)
APPEARANCE UR: CLEAR
AST SERPL W P-5'-P-CCNC: 10 U/L (ref 0–40)
AST SERPL W P-5'-P-CCNC: 11 U/L (ref 0–40)
AST SERPL W P-5'-P-CCNC: 12 U/L (ref 0–40)
AST SERPL W P-5'-P-CCNC: 13 U/L (ref 0–40)
AST SERPL W P-5'-P-CCNC: 13 U/L (ref 0–40)
AST SERPL W P-5'-P-CCNC: 14 U/L (ref 0–40)
AST SERPL W P-5'-P-CCNC: 15 U/L (ref 0–40)
AST SERPL W P-5'-P-CCNC: 15 U/L (ref 0–40)
AST SERPL W P-5'-P-CCNC: 16 U/L (ref 0–40)
AST SERPL W P-5'-P-CCNC: 17 U/L (ref 0–40)
AST SERPL W P-5'-P-CCNC: 24 U/L (ref 0–45)
AST SERPL W P-5'-P-CCNC: 9 U/L (ref 0–40)
BACTERIA #/AREA URNS HPF: ABNORMAL HPF
BASOPHILS # BLD AUTO: 0 THOU/UL (ref 0–0.2)
BASOPHILS # BLD AUTO: 0.1 10E9/L (ref 0–0.2)
BASOPHILS # BLD AUTO: 0.1 10E9/L (ref 0–0.2)
BASOPHILS # BLD AUTO: 0.1 THOU/UL (ref 0–0.2)
BASOPHILS NFR BLD AUTO: 0 % (ref 0–2)
BASOPHILS NFR BLD AUTO: 0.3 %
BASOPHILS NFR BLD AUTO: 0.3 %
BASOPHILS NFR BLD AUTO: 1 % (ref 0–2)
BILIRUB SERPL-MCNC: 0.2 MG/DL (ref 0.2–1.3)
BILIRUB SERPL-MCNC: 0.2 MG/DL (ref 0–1)
BILIRUB SERPL-MCNC: 0.3 MG/DL (ref 0–1)
BILIRUB SERPL-MCNC: 0.4 MG/DL (ref 0–1)
BILIRUB UR QL STRIP: NEGATIVE
BUN SERPL-MCNC: 27 MG/DL (ref 8–28)
BUN SERPL-MCNC: 27 MG/DL (ref 8–28)
BUN SERPL-MCNC: 28 MG/DL (ref 8–28)
BUN SERPL-MCNC: 29 MG/DL (ref 8–28)
BUN SERPL-MCNC: 30 MG/DL (ref 8–28)
BUN SERPL-MCNC: 36 MG/DL (ref 8–28)
BUN SERPL-MCNC: 37 MG/DL (ref 8–28)
BUN SERPL-MCNC: 37 MG/DL (ref 8–28)
BUN SERPL-MCNC: 38 MG/DL (ref 8–28)
BUN SERPL-MCNC: 38 MG/DL (ref 8–28)
BUN SERPL-MCNC: 40 MG/DL (ref 8–28)
BUN SERPL-MCNC: 41 MG/DL (ref 8–28)
BUN SERPL-MCNC: 44 MG/DL (ref 8–28)
BUN SERPL-MCNC: 56 MG/DL (ref 8–28)
BUN SERPL-MCNC: 61 MG/DL (ref 7–30)
BUN SERPL-MCNC: 63 MG/DL (ref 7–30)
BUN SERPL-MCNC: 79 MG/DL (ref 7–30)
CALCIUM SERPL-MCNC: 10.2 MG/DL (ref 8.5–10.5)
CALCIUM SERPL-MCNC: 6.7 MG/DL (ref 8.5–10.1)
CALCIUM SERPL-MCNC: 6.9 MG/DL (ref 8.5–10.1)
CALCIUM SERPL-MCNC: 7 MG/DL (ref 8.5–10.1)
CALCIUM SERPL-MCNC: 8.9 MG/DL (ref 8.5–10.5)
CALCIUM SERPL-MCNC: 9.2 MG/DL (ref 8.5–10.5)
CALCIUM SERPL-MCNC: 9.3 MG/DL (ref 8.5–10.5)
CALCIUM SERPL-MCNC: 9.4 MG/DL (ref 8.5–10.5)
CALCIUM SERPL-MCNC: 9.5 MG/DL (ref 8.5–10.5)
CALCIUM SERPL-MCNC: 9.6 MG/DL (ref 8.5–10.5)
CALCIUM SERPL-MCNC: 9.7 MG/DL (ref 8.5–10.5)
CAP COMMENT: ABNORMAL
CAP COMMENT: ABNORMAL
CHLORIDE BLD-SCNC: 101 MMOL/L (ref 98–107)
CHLORIDE BLD-SCNC: 102 MMOL/L (ref 98–107)
CHLORIDE BLD-SCNC: 104 MMOL/L (ref 98–107)
CHLORIDE BLD-SCNC: 105 MMOL/L (ref 98–107)
CHLORIDE BLD-SCNC: 105 MMOL/L (ref 98–107)
CHLORIDE BLD-SCNC: 106 MMOL/L (ref 98–107)
CHLORIDE BLD-SCNC: 107 MMOL/L (ref 98–107)
CHLORIDE BLD-SCNC: 107 MMOL/L (ref 98–107)
CHLORIDE BLD-SCNC: 108 MMOL/L (ref 98–107)
CHLORIDE BLD-SCNC: 110 MMOL/L (ref 98–107)
CHLORIDE BLD-SCNC: 99 MMOL/L (ref 98–107)
CHLORIDE SERPL-SCNC: 106 MMOL/L (ref 94–109)
CHLORIDE SERPL-SCNC: 107 MMOL/L (ref 94–109)
CHLORIDE SERPL-SCNC: 111 MMOL/L (ref 94–109)
CO2 SERPL-SCNC: 14 MMOL/L (ref 20–32)
CO2 SERPL-SCNC: 15 MMOL/L (ref 20–32)
CO2 SERPL-SCNC: 15 MMOL/L (ref 20–32)
CO2 SERPL-SCNC: 21 MMOL/L (ref 22–31)
CO2 SERPL-SCNC: 22 MMOL/L (ref 22–31)
CO2 SERPL-SCNC: 22 MMOL/L (ref 22–31)
CO2 SERPL-SCNC: 23 MMOL/L (ref 22–31)
CO2 SERPL-SCNC: 23 MMOL/L (ref 22–31)
CO2 SERPL-SCNC: 24 MMOL/L (ref 22–31)
CO2 SERPL-SCNC: 25 MMOL/L (ref 22–31)
CO2 SERPL-SCNC: 26 MMOL/L (ref 22–31)
CO2 SERPL-SCNC: 26 MMOL/L (ref 22–31)
CO2 SERPL-SCNC: 27 MMOL/L (ref 22–31)
COLOR UR AUTO: YELLOW
CREAT SERPL-MCNC: 1.34 MG/DL (ref 0.7–1.3)
CREAT SERPL-MCNC: 1.36 MG/DL (ref 0.7–1.3)
CREAT SERPL-MCNC: 1.36 MG/DL (ref 0.7–1.3)
CREAT SERPL-MCNC: 1.37 MG/DL (ref 0.7–1.3)
CREAT SERPL-MCNC: 1.4 MG/DL (ref 0.7–1.3)
CREAT SERPL-MCNC: 1.41 MG/DL (ref 0.7–1.3)
CREAT SERPL-MCNC: 1.44 MG/DL (ref 0.7–1.3)
CREAT SERPL-MCNC: 1.44 MG/DL (ref 0.7–1.3)
CREAT SERPL-MCNC: 1.45 MG/DL (ref 0.7–1.3)
CREAT SERPL-MCNC: 1.45 MG/DL (ref 0.7–1.3)
CREAT SERPL-MCNC: 1.49 MG/DL (ref 0.7–1.3)
CREAT SERPL-MCNC: 1.5 MG/DL (ref 0.7–1.3)
CREAT SERPL-MCNC: 1.57 MG/DL (ref 0.7–1.3)
CREAT SERPL-MCNC: 1.63 MG/DL (ref 0.7–1.3)
CREAT SERPL-MCNC: 1.67 MG/DL (ref 0.7–1.3)
CREAT SERPL-MCNC: 1.81 MG/DL (ref 0.7–1.3)
CREAT SERPL-MCNC: 3.57 MG/DL (ref 0.66–1.25)
CREAT SERPL-MCNC: 3.83 MG/DL (ref 0.66–1.25)
CREAT SERPL-MCNC: 3.87 MG/DL (ref 0.66–1.25)
CREAT UR-MCNC: 118.2 MG/DL
CREAT UR-MCNC: 35.7 MG/DL
CREAT UR-MCNC: 63.2 MG/DL
DIFFERENTIAL METHOD BLD: ABNORMAL
DIFFERENTIAL METHOD BLD: ABNORMAL
EOSINOPHIL # BLD AUTO: 0 10E9/L (ref 0–0.7)
EOSINOPHIL # BLD AUTO: 0 THOU/UL (ref 0–0.4)
EOSINOPHIL # BLD AUTO: 0.1 10E9/L (ref 0–0.7)
EOSINOPHIL # BLD AUTO: 0.2 THOU/UL (ref 0–0.4)
EOSINOPHIL # BLD AUTO: 0.3 THOU/UL (ref 0–0.4)
EOSINOPHIL COUNT (ABSOLUTE): 0.1 THOU/UL (ref 0–0.4)
EOSINOPHIL NFR BLD AUTO: 0 %
EOSINOPHIL NFR BLD AUTO: 0 % (ref 0–6)
EOSINOPHIL NFR BLD AUTO: 0.4 %
EOSINOPHIL NFR BLD AUTO: 3 % (ref 0–6)
EOSINOPHIL NFR BLD AUTO: 4 % (ref 0–6)
EOSINOPHIL NFR BLD AUTO: 5 % (ref 0–6)
EOSINOPHIL NFR BLD AUTO: 8 % (ref 0–6)
ERYTHROCYTE [DISTWIDTH] IN BLOOD BY AUTOMATED COUNT: 15 % (ref 11–14.5)
ERYTHROCYTE [DISTWIDTH] IN BLOOD BY AUTOMATED COUNT: 15.1 % (ref 11–14.5)
ERYTHROCYTE [DISTWIDTH] IN BLOOD BY AUTOMATED COUNT: 15.1 % (ref 11–14.5)
ERYTHROCYTE [DISTWIDTH] IN BLOOD BY AUTOMATED COUNT: 15.8 % (ref 11–14.5)
ERYTHROCYTE [DISTWIDTH] IN BLOOD BY AUTOMATED COUNT: 16.4 % (ref 11–14.5)
ERYTHROCYTE [DISTWIDTH] IN BLOOD BY AUTOMATED COUNT: 16.4 % (ref 11–14.5)
ERYTHROCYTE [DISTWIDTH] IN BLOOD BY AUTOMATED COUNT: 16.6 % (ref 11–14.5)
ERYTHROCYTE [DISTWIDTH] IN BLOOD BY AUTOMATED COUNT: 17 % (ref 11–14.5)
ERYTHROCYTE [DISTWIDTH] IN BLOOD BY AUTOMATED COUNT: 17.1 % (ref 11–14.5)
ERYTHROCYTE [DISTWIDTH] IN BLOOD BY AUTOMATED COUNT: 17.1 % (ref 11–14.5)
ERYTHROCYTE [DISTWIDTH] IN BLOOD BY AUTOMATED COUNT: 17.6 % (ref 11–14.5)
ERYTHROCYTE [DISTWIDTH] IN BLOOD BY AUTOMATED COUNT: 18.1 % (ref 11–14.5)
ERYTHROCYTE [DISTWIDTH] IN BLOOD BY AUTOMATED COUNT: 19.5 % (ref 11–14.5)
ERYTHROCYTE [DISTWIDTH] IN BLOOD BY AUTOMATED COUNT: 19.8 % (ref 10–15)
ERYTHROCYTE [DISTWIDTH] IN BLOOD BY AUTOMATED COUNT: 19.8 % (ref 10–15)
ERYTHROCYTE [DISTWIDTH] IN BLOOD BY AUTOMATED COUNT: 20.4 % (ref 10–15)
GFR SERPL CREATININE-BSD FRML MDRD: 14 ML/MIN/{1.73_M2}
GFR SERPL CREATININE-BSD FRML MDRD: 14 ML/MIN/{1.73_M2}
GFR SERPL CREATININE-BSD FRML MDRD: 15 ML/MIN/{1.73_M2}
GFR SERPL CREATININE-BSD FRML MDRD: 36 ML/MIN/1.73M2
GFR SERPL CREATININE-BSD FRML MDRD: 40 ML/MIN/1.73M2
GFR SERPL CREATININE-BSD FRML MDRD: 41 ML/MIN/1.73M2
GFR SERPL CREATININE-BSD FRML MDRD: 43 ML/MIN/1.73M2
GFR SERPL CREATININE-BSD FRML MDRD: 45 ML/MIN/1.73M2
GFR SERPL CREATININE-BSD FRML MDRD: 45 ML/MIN/1.73M2
GFR SERPL CREATININE-BSD FRML MDRD: 47 ML/MIN/1.73M2
GFR SERPL CREATININE-BSD FRML MDRD: 48 ML/MIN/1.73M2
GFR SERPL CREATININE-BSD FRML MDRD: 49 ML/MIN/1.73M2
GFR SERPL CREATININE-BSD FRML MDRD: 50 ML/MIN/1.73M2
GFR SERPL CREATININE-BSD FRML MDRD: 51 ML/MIN/1.73M2
GLUCOSE BLD-MCNC: 102 MG/DL (ref 70–125)
GLUCOSE BLD-MCNC: 104 MG/DL (ref 70–125)
GLUCOSE BLD-MCNC: 109 MG/DL (ref 70–125)
GLUCOSE BLD-MCNC: 112 MG/DL (ref 70–125)
GLUCOSE BLD-MCNC: 122 MG/DL (ref 70–125)
GLUCOSE BLD-MCNC: 151 MG/DL (ref 70–125)
GLUCOSE BLD-MCNC: 223 MG/DL (ref 70–125)
GLUCOSE BLD-MCNC: 262 MG/DL (ref 70–125)
GLUCOSE BLD-MCNC: 295 MG/DL (ref 70–125)
GLUCOSE BLD-MCNC: 363 MG/DL (ref 70–125)
GLUCOSE BLD-MCNC: 67 MG/DL (ref 70–125)
GLUCOSE BLD-MCNC: 77 MG/DL (ref 70–125)
GLUCOSE BLD-MCNC: 85 MG/DL (ref 70–125)
GLUCOSE BLD-MCNC: 89 MG/DL (ref 70–125)
GLUCOSE BLD-MCNC: 96 MG/DL (ref 70–125)
GLUCOSE BLD-MCNC: 97 MG/DL (ref 70–125)
GLUCOSE BLDC GLUCOMTR-MCNC: 104 MG/DL (ref 70–139)
GLUCOSE BLDC GLUCOMTR-MCNC: 81 MG/DL (ref 70–139)
GLUCOSE BLDC GLUCOMTR-MCNC: 84 MG/DL (ref 70–139)
GLUCOSE SERPL-MCNC: 157 MG/DL (ref 70–99)
GLUCOSE SERPL-MCNC: 162 MG/DL (ref 70–99)
GLUCOSE SERPL-MCNC: 164 MG/DL (ref 70–99)
GLUCOSE UR STRIP-MCNC: NEGATIVE MG/DL
HCT VFR BLD AUTO: 28.1 % (ref 40–54)
HCT VFR BLD AUTO: 30.3 % (ref 40–53)
HCT VFR BLD AUTO: 31 % (ref 40–54)
HCT VFR BLD AUTO: 32.8 % (ref 40–53)
HCT VFR BLD AUTO: 33.2 % (ref 40–54)
HCT VFR BLD AUTO: 34.9 % (ref 40–54)
HCT VFR BLD AUTO: 35.1 % (ref 40–54)
HCT VFR BLD AUTO: 35.4 % (ref 40–53)
HCT VFR BLD AUTO: 35.5 % (ref 40–54)
HCT VFR BLD AUTO: 36 % (ref 40–54)
HCT VFR BLD AUTO: 36.6 % (ref 40–54)
HCT VFR BLD AUTO: 37.1 % (ref 40–54)
HCT VFR BLD AUTO: 37.6 % (ref 40–54)
HCT VFR BLD AUTO: 38.1 % (ref 40–54)
HCT VFR BLD AUTO: 38.1 % (ref 40–54)
HCT VFR BLD AUTO: 39.3 % (ref 40–54)
HGB BLD-MCNC: 10 G/DL (ref 14–18)
HGB BLD-MCNC: 10.2 G/DL (ref 13.3–17.7)
HGB BLD-MCNC: 10.8 G/DL (ref 14–18)
HGB BLD-MCNC: 10.9 G/DL (ref 13.3–17.7)
HGB BLD-MCNC: 11.1 G/DL (ref 14–18)
HGB BLD-MCNC: 11.3 G/DL (ref 14–18)
HGB BLD-MCNC: 11.3 G/DL (ref 14–18)
HGB BLD-MCNC: 11.5 G/DL (ref 14–18)
HGB BLD-MCNC: 11.6 G/DL (ref 14–18)
HGB BLD-MCNC: 11.7 G/DL (ref 14–18)
HGB BLD-MCNC: 11.8 G/DL (ref 14–18)
HGB BLD-MCNC: 11.9 G/DL (ref 14–18)
HGB BLD-MCNC: 12.1 G/DL (ref 14–18)
HGB BLD-MCNC: 12.3 G/DL (ref 14–18)
HGB BLD-MCNC: 8.9 G/DL (ref 14–18)
HGB BLD-MCNC: 9.1 G/DL (ref 13.3–17.7)
HGB UR QL STRIP: NEGATIVE
IMM GRANULOCYTES # BLD: 0.1 THOU/UL
IMM GRANULOCYTES # BLD: 0.1 THOU/UL
IMM GRANULOCYTES # BLD: 0.5 10E9/L (ref 0–0.4)
IMM GRANULOCYTES # BLD: 0.6 10E9/L (ref 0–0.4)
IMM GRANULOCYTES NFR BLD: 1 %
IMM GRANULOCYTES NFR BLD: 1 %
IMM GRANULOCYTES NFR BLD: 1.7 %
IMM GRANULOCYTES NFR BLD: 1.9 %
INR PPP: 0.99 (ref 0.9–1.1)
KETONES UR STRIP-MCNC: NEGATIVE MG/DL
LAB AP CHARGES (HE HISTORICAL CONVERSION): ABNORMAL
LAB AP CHARGES (HE HISTORICAL CONVERSION): ABNORMAL
LAB AP INITIAL CYTO EVAL (HE HISTORICAL CONVERSION): ABNORMAL
LAB MED GENERAL PATH INTERP (HE HISTORICAL CONVERSION): ABNORMAL
LAB MED GENERAL PATH INTERP (HE HISTORICAL CONVERSION): ABNORMAL
LACTATE BLD-SCNC: 1.2 MMOL/L (ref 0.7–2)
LACTATE BLD-SCNC: 1.8 MMOL/L (ref 0.7–2)
LACTATE BLD-SCNC: 1.9 MMOL/L (ref 0.7–2)
LEUKOCYTE ESTERASE UR QL STRIP: NEGATIVE
LYMPHOCYTES # BLD AUTO: 0.5 10E9/L (ref 0.8–5.3)
LYMPHOCYTES # BLD AUTO: 0.5 THOU/UL (ref 0.8–4.4)
LYMPHOCYTES # BLD AUTO: 0.5 THOU/UL (ref 0.8–4.4)
LYMPHOCYTES # BLD AUTO: 0.6 10E9/L (ref 0.8–5.3)
LYMPHOCYTES # BLD AUTO: 0.6 THOU/UL (ref 0.8–4.4)
LYMPHOCYTES # BLD AUTO: 0.6 THOU/UL (ref 0.8–4.4)
LYMPHOCYTES # BLD AUTO: 0.7 THOU/UL (ref 0.8–4.4)
LYMPHOCYTES # BLD AUTO: 0.9 THOU/UL (ref 0.8–4.4)
LYMPHOCYTES # BLD AUTO: 1 THOU/UL (ref 0.8–4.4)
LYMPHOCYTES # BLD AUTO: 1 THOU/UL (ref 0.8–4.4)
LYMPHOCYTES # BLD AUTO: 1.1 THOU/UL (ref 0.8–4.4)
LYMPHOCYTES # BLD AUTO: 1.1 THOU/UL (ref 0.8–4.4)
LYMPHOCYTES # BLD AUTO: 1.2 THOU/UL (ref 0.8–4.4)
LYMPHOCYTES # BLD AUTO: 1.2 THOU/UL (ref 0.8–4.4)
LYMPHOCYTES # BLD AUTO: 1.3 THOU/UL (ref 0.8–4.4)
LYMPHOCYTES NFR BLD AUTO: 1.5 %
LYMPHOCYTES NFR BLD AUTO: 1.9 %
LYMPHOCYTES NFR BLD AUTO: 15 % (ref 20–40)
LYMPHOCYTES NFR BLD AUTO: 15 % (ref 20–40)
LYMPHOCYTES NFR BLD AUTO: 16 % (ref 20–40)
LYMPHOCYTES NFR BLD AUTO: 17 % (ref 20–40)
LYMPHOCYTES NFR BLD AUTO: 17 % (ref 20–40)
LYMPHOCYTES NFR BLD AUTO: 18 % (ref 20–40)
LYMPHOCYTES NFR BLD AUTO: 19 % (ref 20–40)
LYMPHOCYTES NFR BLD AUTO: 19 % (ref 20–40)
LYMPHOCYTES NFR BLD AUTO: 3 % (ref 20–40)
LYMPHOCYTES NFR BLD AUTO: 3 % (ref 20–40)
LYMPHOCYTES NFR BLD AUTO: 4 % (ref 20–40)
LYMPHOCYTES NFR BLD AUTO: 4 % (ref 20–40)
LYMPHOCYTES NFR BLD AUTO: 56 % (ref 20–40)
MCH RBC QN AUTO: 26.9 PG (ref 26.5–33)
MCH RBC QN AUTO: 27.3 PG (ref 27–34)
MCH RBC QN AUTO: 27.4 PG (ref 26.5–33)
MCH RBC QN AUTO: 27.4 PG (ref 27–34)
MCH RBC QN AUTO: 27.6 PG (ref 26.5–33)
MCH RBC QN AUTO: 27.6 PG (ref 27–34)
MCH RBC QN AUTO: 27.6 PG (ref 27–34)
MCH RBC QN AUTO: 27.7 PG (ref 27–34)
MCH RBC QN AUTO: 27.8 PG (ref 27–34)
MCH RBC QN AUTO: 27.9 PG (ref 27–34)
MCH RBC QN AUTO: 27.9 PG (ref 27–34)
MCH RBC QN AUTO: 28.5 PG (ref 27–34)
MCH RBC QN AUTO: 28.5 PG (ref 27–34)
MCH RBC QN AUTO: 29.1 PG (ref 27–34)
MCH RBC QN AUTO: 29.3 PG (ref 27–34)
MCH RBC QN AUTO: 29.3 PG (ref 27–34)
MCHC RBC AUTO-ENTMCNC: 30 G/DL (ref 31.5–36.5)
MCHC RBC AUTO-ENTMCNC: 30.6 G/DL (ref 32–36)
MCHC RBC AUTO-ENTMCNC: 30.8 G/DL (ref 31.5–36.5)
MCHC RBC AUTO-ENTMCNC: 31.1 G/DL (ref 31.5–36.5)
MCHC RBC AUTO-ENTMCNC: 31.2 G/DL (ref 32–36)
MCHC RBC AUTO-ENTMCNC: 31.3 G/DL (ref 32–36)
MCHC RBC AUTO-ENTMCNC: 31.7 G/DL (ref 32–36)
MCHC RBC AUTO-ENTMCNC: 31.8 G/DL (ref 32–36)
MCHC RBC AUTO-ENTMCNC: 32 G/DL (ref 32–36)
MCHC RBC AUTO-ENTMCNC: 32.2 G/DL (ref 32–36)
MCHC RBC AUTO-ENTMCNC: 32.2 G/DL (ref 32–36)
MCHC RBC AUTO-ENTMCNC: 32.3 G/DL (ref 32–36)
MCHC RBC AUTO-ENTMCNC: 32.5 G/DL (ref 32–36)
MCV RBC AUTO: 86 FL (ref 80–100)
MCV RBC AUTO: 87 FL (ref 80–100)
MCV RBC AUTO: 88 FL (ref 78–100)
MCV RBC AUTO: 88 FL (ref 80–100)
MCV RBC AUTO: 90 FL (ref 78–100)
MCV RBC AUTO: 90 FL (ref 78–100)
MCV RBC AUTO: 91 FL (ref 80–100)
MCV RBC AUTO: 92 FL (ref 80–100)
MCV RBC AUTO: 96 FL (ref 80–100)
MONOCYTES # BLD AUTO: 0.1 THOU/UL (ref 0–0.9)
MONOCYTES # BLD AUTO: 0.2 THOU/UL (ref 0–0.9)
MONOCYTES # BLD AUTO: 0.3 THOU/UL (ref 0–0.9)
MONOCYTES # BLD AUTO: 0.4 THOU/UL (ref 0–0.9)
MONOCYTES # BLD AUTO: 0.4 THOU/UL (ref 0–0.9)
MONOCYTES # BLD AUTO: 0.6 THOU/UL (ref 0–0.9)
MONOCYTES # BLD AUTO: 0.7 THOU/UL (ref 0–0.9)
MONOCYTES # BLD AUTO: 0.7 THOU/UL (ref 0–0.9)
MONOCYTES # BLD AUTO: 1.2 10E9/L (ref 0–1.3)
MONOCYTES # BLD AUTO: 1.4 10E9/L (ref 0–1.3)
MONOCYTES NFR BLD AUTO: 1 % (ref 2–10)
MONOCYTES NFR BLD AUTO: 1 % (ref 2–10)
MONOCYTES NFR BLD AUTO: 10 % (ref 2–10)
MONOCYTES NFR BLD AUTO: 10 % (ref 2–10)
MONOCYTES NFR BLD AUTO: 11 % (ref 2–10)
MONOCYTES NFR BLD AUTO: 11 % (ref 2–10)
MONOCYTES NFR BLD AUTO: 2 % (ref 2–10)
MONOCYTES NFR BLD AUTO: 3 % (ref 2–10)
MONOCYTES NFR BLD AUTO: 3.9 %
MONOCYTES NFR BLD AUTO: 30 % (ref 2–10)
MONOCYTES NFR BLD AUTO: 4.8 %
MONOCYTES NFR BLD AUTO: 8 % (ref 2–10)
MONOCYTES NFR BLD AUTO: 9 % (ref 2–10)
MUCOUS THREADS #/AREA URNS LPF: ABNORMAL LPF
NEUTROPHILS # BLD AUTO: 11.4 THOU/UL (ref 2–7.7)
NEUTROPHILS # BLD AUTO: 13.3 THOU/UL (ref 2–7.7)
NEUTROPHILS # BLD AUTO: 14.1 THOU/UL (ref 2–7.7)
NEUTROPHILS # BLD AUTO: 18.4 THOU/UL (ref 2–7.7)
NEUTROPHILS # BLD AUTO: 27.1 10E9/L (ref 1.6–8.3)
NEUTROPHILS # BLD AUTO: 29.2 10E9/L (ref 1.6–8.3)
NEUTROPHILS # BLD AUTO: 3.7 THOU/UL (ref 2–7.7)
NEUTROPHILS # BLD AUTO: 4.2 THOU/UL (ref 2–7.7)
NEUTROPHILS # BLD AUTO: 4.5 THOU/UL (ref 2–7.7)
NEUTROPHILS # BLD AUTO: 4.6 THOU/UL (ref 2–7.7)
NEUTROPHILS # BLD AUTO: 5 THOU/UL (ref 2–7.7)
NEUTROPHILS # BLD AUTO: 5.8 THOU/UL (ref 2–7.7)
NEUTROPHILS NFR BLD AUTO: 65 % (ref 50–70)
NEUTROPHILS NFR BLD AUTO: 67 % (ref 50–70)
NEUTROPHILS NFR BLD AUTO: 68 % (ref 50–70)
NEUTROPHILS NFR BLD AUTO: 69 % (ref 50–70)
NEUTROPHILS NFR BLD AUTO: 70 % (ref 50–70)
NEUTROPHILS NFR BLD AUTO: 73 % (ref 50–70)
NEUTROPHILS NFR BLD AUTO: 91.3 %
NEUTROPHILS NFR BLD AUTO: 92 %
NEUTROPHILS NFR BLD AUTO: 92 % (ref 50–70)
NEUTROPHILS NFR BLD AUTO: 94 % (ref 50–70)
NEUTROPHILS NFR BLD AUTO: 95 % (ref 50–70)
NEUTROPHILS NFR BLD AUTO: 95 % (ref 50–70)
NITRATE UR QL: NEGATIVE
NRBC # BLD AUTO: 0 10*3/UL
NRBC # BLD AUTO: 0 10*3/UL
NRBC BLD AUTO-RTO: 0 /100
NRBC BLD AUTO-RTO: 0 /100
PATH REPORT.ADDENDUM SPEC: ABNORMAL
PATH REPORT.ADDENDUM SPEC: ABNORMAL
PATH REPORT.COMMENTS IMP SPEC: ABNORMAL
PATH REPORT.FINAL DX SPEC: ABNORMAL
PATH REPORT.FINAL DX SPEC: ABNORMAL
PATH REPORT.MICROSCOPIC SPEC OTHER STN: ABNORMAL
PATH REPORT.MICROSCOPIC SPEC OTHER STN: ABNORMAL
PATH REPORT.RELEVANT HX SPEC: ABNORMAL
PATH REPORT.RELEVANT HX SPEC: ABNORMAL
PH UR STRIP: 5 [PH] (ref 4.5–8)
PHOSPHATE SERPL-MCNC: 3.1 MG/DL (ref 2.5–4.5)
PHOSPHATE SERPL-MCNC: 3.2 MG/DL (ref 2.5–4.5)
PLAT MORPH BLD: NORMAL
PLATELET # BLD AUTO: 205 THOU/UL (ref 140–440)
PLATELET # BLD AUTO: 230 THOU/UL (ref 140–440)
PLATELET # BLD AUTO: 235 THOU/UL (ref 140–440)
PLATELET # BLD AUTO: 236 THOU/UL (ref 140–440)
PLATELET # BLD AUTO: 238 THOU/UL (ref 140–440)
PLATELET # BLD AUTO: 238 THOU/UL (ref 140–440)
PLATELET # BLD AUTO: 252 THOU/UL (ref 140–440)
PLATELET # BLD AUTO: 254 THOU/UL (ref 140–440)
PLATELET # BLD AUTO: 262 THOU/UL (ref 140–440)
PLATELET # BLD AUTO: 273 THOU/UL (ref 140–440)
PLATELET # BLD AUTO: 285 THOU/UL (ref 140–440)
PLATELET # BLD AUTO: 296 THOU/UL (ref 140–440)
PLATELET # BLD AUTO: 304 10E9/L (ref 150–450)
PLATELET # BLD AUTO: 317 THOU/UL (ref 140–440)
PLATELET # BLD AUTO: 332 10E9/L (ref 150–450)
PLATELET # BLD AUTO: 368 10E9/L (ref 150–450)
PMV BLD AUTO: 10.3 FL (ref 8.5–12.5)
PMV BLD AUTO: 10.3 FL (ref 8.5–12.5)
PMV BLD AUTO: 10.4 FL (ref 8.5–12.5)
PMV BLD AUTO: 10.5 FL (ref 8.5–12.5)
PMV BLD AUTO: 10.6 FL (ref 8.5–12.5)
PMV BLD AUTO: 10.6 FL (ref 8.5–12.5)
PMV BLD AUTO: 9.9 FL (ref 8.5–12.5)
POTASSIUM BLD-SCNC: 4.1 MMOL/L (ref 3.5–5)
POTASSIUM BLD-SCNC: 4.2 MMOL/L (ref 3.5–5)
POTASSIUM BLD-SCNC: 4.2 MMOL/L (ref 3.5–5)
POTASSIUM BLD-SCNC: 4.3 MMOL/L (ref 3.5–5)
POTASSIUM BLD-SCNC: 4.3 MMOL/L (ref 3.5–5)
POTASSIUM BLD-SCNC: 4.4 MMOL/L (ref 3.5–5)
POTASSIUM BLD-SCNC: 4.5 MMOL/L (ref 3.5–5)
POTASSIUM BLD-SCNC: 4.6 MMOL/L (ref 3.5–5)
POTASSIUM BLD-SCNC: 4.6 MMOL/L (ref 3.5–5)
POTASSIUM SERPL-SCNC: 3.7 MMOL/L (ref 3.4–5.3)
POTASSIUM SERPL-SCNC: 3.8 MMOL/L (ref 3.4–5.3)
POTASSIUM SERPL-SCNC: 4.2 MMOL/L (ref 3.4–5.3)
PROT SERPL-MCNC: 4.3 G/DL (ref 6.8–8.8)
PROT SERPL-MCNC: 6.1 G/DL (ref 6–8)
PROT SERPL-MCNC: 6.3 G/DL (ref 6–8)
PROT SERPL-MCNC: 6.5 G/DL (ref 6–8)
PROT SERPL-MCNC: 6.5 G/DL (ref 6–8)
PROT SERPL-MCNC: 6.6 G/DL (ref 6–8)
PROT SERPL-MCNC: 6.6 G/DL (ref 6–8)
PROT SERPL-MCNC: 6.7 G/DL (ref 6–8)
PROT SERPL-MCNC: 6.8 G/DL (ref 6–8)
PROT SERPL-MCNC: 6.8 G/DL (ref 6–8)
PROT SERPL-MCNC: 6.9 G/DL (ref 6–8)
PROT SERPL-MCNC: 7 G/DL (ref 6–8)
PROT SERPL-MCNC: 7.1 G/DL (ref 6–8)
PROT SERPL-MCNC: 7.3 G/DL (ref 6–8)
PROTEIN, RANDOM URINE - HISTORICAL: 22 MG/DL
PROTEIN, RANDOM URINE - HISTORICAL: 30 MG/DL
PROTEIN, RANDOM URINE - HISTORICAL: 8 MG/DL
PROTEIN/CREAT RATIO, RANDOM UR: 0.19
PROTEIN/CREAT RATIO, RANDOM UR: 0.22
PROTEIN/CREAT RATIO, RANDOM UR: 0.47
PTH-INTACT SERPL-MCNC: 118 PG/ML (ref 10–86)
PTH-INTACT SERPL-MCNC: 85 PG/ML (ref 10–86)
RBC # BLD AUTO: 3.21 MILL/UL (ref 4.4–6.2)
RBC # BLD AUTO: 3.38 10E12/L (ref 4.4–5.9)
RBC # BLD AUTO: 3.51 MILL/UL (ref 4.4–6.2)
RBC # BLD AUTO: 3.72 10E12/L (ref 4.4–5.9)
RBC # BLD AUTO: 3.79 MILL/UL (ref 4.4–6.2)
RBC # BLD AUTO: 3.86 MILL/UL (ref 4.4–6.2)
RBC # BLD AUTO: 3.88 MILL/UL (ref 4.4–6.2)
RBC # BLD AUTO: 3.92 MILL/UL (ref 4.4–6.2)
RBC # BLD AUTO: 3.95 10E12/L (ref 4.4–5.9)
RBC # BLD AUTO: 3.98 MILL/UL (ref 4.4–6.2)
RBC # BLD AUTO: 4.17 MILL/UL (ref 4.4–6.2)
RBC # BLD AUTO: 4.19 MILL/UL (ref 4.4–6.2)
RBC # BLD AUTO: 4.27 MILL/UL (ref 4.4–6.2)
RBC # BLD AUTO: 4.34 MILL/UL (ref 4.4–6.2)
RBC # BLD AUTO: 4.39 MILL/UL (ref 4.4–6.2)
RBC # BLD AUTO: 4.5 MILL/UL (ref 4.4–6.2)
RBC #/AREA URNS AUTO: ABNORMAL HPF
SARS-COV-2 PCR COMMENT: NORMAL
SARS-COV-2 RNA SPEC QL NAA+PROBE: NEGATIVE
SARS-COV-2 VIRUS SPECIMEN SOURCE: NORMAL
SODIUM SERPL-SCNC: 132 MMOL/L (ref 136–145)
SODIUM SERPL-SCNC: 133 MMOL/L (ref 136–145)
SODIUM SERPL-SCNC: 134 MMOL/L (ref 136–145)
SODIUM SERPL-SCNC: 134 MMOL/L (ref 136–145)
SODIUM SERPL-SCNC: 135 MMOL/L (ref 133–144)
SODIUM SERPL-SCNC: 135 MMOL/L (ref 136–145)
SODIUM SERPL-SCNC: 136 MMOL/L (ref 133–144)
SODIUM SERPL-SCNC: 137 MMOL/L (ref 136–145)
SODIUM SERPL-SCNC: 137 MMOL/L (ref 136–145)
SODIUM SERPL-SCNC: 138 MMOL/L (ref 133–144)
SODIUM SERPL-SCNC: 138 MMOL/L (ref 136–145)
SODIUM SERPL-SCNC: 139 MMOL/L (ref 136–145)
SODIUM SERPL-SCNC: 140 MMOL/L (ref 136–145)
SP GR UR STRIP: 1 (ref 1–1.03)
SPECIMEN DESCRIPTION: ABNORMAL
SPECIMEN DESCRIPTION: ABNORMAL
SQUAMOUS #/AREA URNS AUTO: ABNORMAL LPF
TOTAL NEUTROPHILS-ABS(DIFF): 0.1 THOU/UL (ref 2–7.7)
TOTAL NEUTROPHILS-REL(DIFF): 6 % (ref 50–70)
TSH SERPL DL<=0.005 MIU/L-ACNC: 1.96 UIU/ML (ref 0.3–5)
TSH SERPL DL<=0.005 MIU/L-ACNC: 2.07 UIU/ML (ref 0.3–5)
TSH SERPL DL<=0.005 MIU/L-ACNC: 2.17 UIU/ML (ref 0.3–5)
TSH SERPL DL<=0.005 MIU/L-ACNC: 2.4 UIU/ML (ref 0.3–5)
TSH SERPL DL<=0.005 MIU/L-ACNC: 2.41 UIU/ML (ref 0.3–5)
TSH SERPL DL<=0.005 MIU/L-ACNC: 2.41 UIU/ML (ref 0.3–5)
TSH SERPL DL<=0.005 MIU/L-ACNC: 3.04 UIU/ML (ref 0.3–5)
TSH SERPL DL<=0.005 MIU/L-ACNC: 3.06 UIU/ML (ref 0.3–5)
UROBILINOGEN UR STRIP-ACNC: ABNORMAL
WBC # BLD AUTO: 25.1 10E9/L (ref 4–11)
WBC # BLD AUTO: 29.7 10E9/L (ref 4–11)
WBC # BLD AUTO: 31.7 10E9/L (ref 4–11)
WBC #/AREA URNS AUTO: ABNORMAL HPF
WBC: 1.2 THOU/UL (ref 4–11)
WBC: 12.4 THOU/UL (ref 4–11)
WBC: 14.2 THOU/UL (ref 4–11)
WBC: 14.9 THOU/UL (ref 4–11)
WBC: 19.4 THOU/UL (ref 4–11)
WBC: 5.7 THOU/UL (ref 4–11)
WBC: 6 THOU/UL (ref 4–11)
WBC: 6 THOU/UL (ref 4–11)
WBC: 6.3 THOU/UL (ref 4–11)
WBC: 6.5 THOU/UL (ref 4–11)
WBC: 6.8 THOU/UL (ref 4–11)
WBC: 7.1 THOU/UL (ref 4–11)
WBC: 7.9 THOU/UL (ref 4–11)

## 2020-01-01 PROCEDURE — 87040 BLOOD CULTURE FOR BACTERIA: CPT | Performed by: INTERNAL MEDICINE

## 2020-01-01 PROCEDURE — 85025 COMPLETE CBC W/AUTO DIFF WBC: CPT | Performed by: INTERNAL MEDICINE

## 2020-01-01 PROCEDURE — 99239 HOSP IP/OBS DSCHRG MGMT >30: CPT | Performed by: INTERNAL MEDICINE

## 2020-01-01 PROCEDURE — 83605 ASSAY OF LACTIC ACID: CPT | Performed by: INTERNAL MEDICINE

## 2020-01-01 PROCEDURE — 99223 1ST HOSP IP/OBS HIGH 75: CPT | Performed by: NURSE PRACTITIONER

## 2020-01-01 PROCEDURE — 250N000013 HC RX MED GY IP 250 OP 250 PS 637: Performed by: INTERNAL MEDICINE

## 2020-01-01 PROCEDURE — 99233 SBSQ HOSP IP/OBS HIGH 50: CPT | Performed by: INTERNAL MEDICINE

## 2020-01-01 PROCEDURE — 250N000009 HC RX 250: Performed by: INTERNAL MEDICINE

## 2020-01-01 PROCEDURE — 258N000003 HC RX IP 258 OP 636: Performed by: INTERNAL MEDICINE

## 2020-01-01 PROCEDURE — 80048 BASIC METABOLIC PNL TOTAL CA: CPT | Performed by: INTERNAL MEDICINE

## 2020-01-01 PROCEDURE — 94640 AIRWAY INHALATION TREATMENT: CPT

## 2020-01-01 PROCEDURE — 250N000011 HC RX IP 250 OP 636: Performed by: INTERNAL MEDICINE

## 2020-01-01 PROCEDURE — 999N000157 HC STATISTIC RCP TIME EA 10 MIN

## 2020-01-01 PROCEDURE — 120N000001 HC R&B MED SURG/OB

## 2020-01-01 PROCEDURE — 85027 COMPLETE CBC AUTOMATED: CPT | Performed by: INTERNAL MEDICINE

## 2020-01-01 PROCEDURE — 80053 COMPREHEN METABOLIC PANEL: CPT | Performed by: INTERNAL MEDICINE

## 2020-01-01 PROCEDURE — 36415 COLL VENOUS BLD VENIPUNCTURE: CPT | Performed by: INTERNAL MEDICINE

## 2020-01-01 PROCEDURE — 99207 PR CONSULT E&M CHANGED TO INITIAL LEVEL: CPT | Performed by: NURSE PRACTITIONER

## 2020-01-01 PROCEDURE — 99223 1ST HOSP IP/OBS HIGH 75: CPT | Mod: AI | Performed by: INTERNAL MEDICINE

## 2020-01-01 RX ORDER — ALBUTEROL SULFATE 90 UG/1
2 AEROSOL, METERED RESPIRATORY (INHALATION) EVERY 6 HOURS PRN
COMMUNITY
Start: 2020-01-01

## 2020-01-01 RX ORDER — HYDROMORPHONE HYDROCHLORIDE 1 MG/ML
.5-1 INJECTION, SOLUTION INTRAMUSCULAR; INTRAVENOUS; SUBCUTANEOUS
Status: DISCONTINUED | OUTPATIENT
Start: 2020-01-01 | End: 2020-01-01 | Stop reason: HOSPADM

## 2020-01-01 RX ORDER — VANCOMYCIN HYDROCHLORIDE 250 MG/1
500 CAPSULE ORAL EVERY 6 HOURS
Status: DISCONTINUED | OUTPATIENT
Start: 2020-01-01 | End: 2020-01-01

## 2020-01-01 RX ORDER — PROCHLORPERAZINE MALEATE 10 MG
10 TABLET ORAL EVERY 6 HOURS PRN
COMMUNITY
Start: 2020-01-01

## 2020-01-01 RX ORDER — LORATADINE 10 MG/1
10 TABLET ORAL DAILY
COMMUNITY

## 2020-01-01 RX ORDER — ATROPINE SULFATE 10 MG/ML
1-2 SOLUTION/ DROPS OPHTHALMIC
Status: DISCONTINUED | OUTPATIENT
Start: 2020-01-01 | End: 2020-01-01 | Stop reason: HOSPADM

## 2020-01-01 RX ORDER — HALOPERIDOL 5 MG/ML
.5-1 INJECTION INTRAMUSCULAR
Status: DISCONTINUED | OUTPATIENT
Start: 2020-01-01 | End: 2020-01-01 | Stop reason: HOSPADM

## 2020-01-01 RX ORDER — ONDANSETRON 4 MG/1
4 TABLET, ORALLY DISINTEGRATING ORAL EVERY 6 HOURS PRN
Status: DISCONTINUED | OUTPATIENT
Start: 2020-01-01 | End: 2020-01-01

## 2020-01-01 RX ORDER — ACETAMINOPHEN 650 MG/1
650 SUPPOSITORY RECTAL EVERY 6 HOURS PRN
Status: DISCONTINUED | OUTPATIENT
Start: 2020-01-01 | End: 2020-01-01 | Stop reason: HOSPADM

## 2020-01-01 RX ORDER — NALOXONE HYDROCHLORIDE 0.4 MG/ML
.1-.4 INJECTION, SOLUTION INTRAMUSCULAR; INTRAVENOUS; SUBCUTANEOUS
Status: DISCONTINUED | OUTPATIENT
Start: 2020-01-01 | End: 2020-01-01 | Stop reason: HOSPADM

## 2020-01-01 RX ORDER — BISACODYL 10 MG
10 SUPPOSITORY, RECTAL RECTAL
Status: DISCONTINUED | OUTPATIENT
Start: 2020-10-31 | End: 2020-01-01 | Stop reason: HOSPADM

## 2020-01-01 RX ORDER — TAMSULOSIN HYDROCHLORIDE 0.4 MG/1
0.4 CAPSULE ORAL DAILY
Status: DISCONTINUED | OUTPATIENT
Start: 2020-01-01 | End: 2020-01-01

## 2020-01-01 RX ORDER — TIOTROPIUM BROMIDE 18 UG/1
2 CAPSULE ORAL; RESPIRATORY (INHALATION) DAILY
COMMUNITY

## 2020-01-01 RX ORDER — METOPROLOL TARTRATE 25 MG/1
25 TABLET, FILM COATED ORAL 2 TIMES DAILY
COMMUNITY
Start: 2020-01-01

## 2020-01-01 RX ORDER — LORAZEPAM 2 MG/ML
.5-1 INJECTION INTRAMUSCULAR
Status: DISCONTINUED | OUTPATIENT
Start: 2020-01-01 | End: 2020-01-01 | Stop reason: HOSPADM

## 2020-01-01 RX ORDER — CILOSTAZOL 100 MG/1
100 TABLET ORAL 2 TIMES DAILY
COMMUNITY
Start: 2019-09-12

## 2020-01-01 RX ORDER — OXYCODONE HYDROCHLORIDE 5 MG/1
5-10 TABLET ORAL
Status: DISCONTINUED | OUTPATIENT
Start: 2020-01-01 | End: 2020-01-01

## 2020-01-01 RX ORDER — SODIUM CHLORIDE 9 MG/ML
INJECTION, SOLUTION INTRAVENOUS CONTINUOUS
Status: DISCONTINUED | OUTPATIENT
Start: 2020-01-01 | End: 2020-01-01

## 2020-01-01 RX ORDER — ONDANSETRON 2 MG/ML
4 INJECTION INTRAMUSCULAR; INTRAVENOUS EVERY 6 HOURS PRN
Status: DISCONTINUED | OUTPATIENT
Start: 2020-01-01 | End: 2020-01-01 | Stop reason: HOSPADM

## 2020-01-01 RX ORDER — HYDROCODONE BITARTRATE AND ACETAMINOPHEN 5; 325 MG/1; MG/1
1 TABLET ORAL EVERY 6 HOURS PRN
COMMUNITY
Start: 2020-01-01

## 2020-01-01 RX ORDER — SODIUM CHLORIDE 9 MG/ML
INJECTION, SOLUTION INTRAVENOUS CONTINUOUS
Status: DISCONTINUED | OUTPATIENT
Start: 2020-01-01 | End: 2020-01-01 | Stop reason: HOSPADM

## 2020-01-01 RX ORDER — VANCOMYCIN HYDROCHLORIDE 250 MG/1
500 CAPSULE ORAL 4 TIMES DAILY
Status: DISCONTINUED | OUTPATIENT
Start: 2020-01-01 | End: 2020-01-01

## 2020-01-01 RX ORDER — HYDROMORPHONE HYDROCHLORIDE 1 MG/ML
.3-.5 INJECTION, SOLUTION INTRAMUSCULAR; INTRAVENOUS; SUBCUTANEOUS
Status: DISCONTINUED | OUTPATIENT
Start: 2020-01-01 | End: 2020-01-01

## 2020-01-01 RX ORDER — FLUTICASONE PROPIONATE 50 MCG
1 SPRAY, SUSPENSION (ML) NASAL DAILY
COMMUNITY
Start: 2020-01-01

## 2020-01-01 RX ORDER — ACETAMINOPHEN 325 MG/1
650 TABLET ORAL EVERY 4 HOURS PRN
Status: DISCONTINUED | OUTPATIENT
Start: 2020-01-01 | End: 2020-01-01

## 2020-01-01 RX ORDER — ALBUTEROL SULFATE 0.83 MG/ML
2.5 SOLUTION RESPIRATORY (INHALATION)
Status: DISCONTINUED | OUTPATIENT
Start: 2020-01-01 | End: 2020-01-01 | Stop reason: HOSPADM

## 2020-01-01 RX ORDER — LIDOCAINE 40 MG/G
CREAM TOPICAL
Status: DISCONTINUED | OUTPATIENT
Start: 2020-01-01 | End: 2020-01-01 | Stop reason: HOSPADM

## 2020-01-01 RX ORDER — CIPROFLOXACIN 2 MG/ML
400 INJECTION, SOLUTION INTRAVENOUS EVERY 24 HOURS
Status: DISCONTINUED | OUTPATIENT
Start: 2020-01-01 | End: 2020-01-01

## 2020-01-01 RX ORDER — ACETAMINOPHEN 500 MG
500 TABLET ORAL EVERY 6 HOURS PRN
COMMUNITY

## 2020-01-01 RX ORDER — HYDROMORPHONE HYDROCHLORIDE 1 MG/ML
0.5 INJECTION, SOLUTION INTRAMUSCULAR; INTRAVENOUS; SUBCUTANEOUS ONCE
Status: DISCONTINUED | OUTPATIENT
Start: 2020-01-01 | End: 2020-01-01 | Stop reason: HOSPADM

## 2020-01-01 RX ORDER — TAMSULOSIN HYDROCHLORIDE 0.4 MG/1
0.4 CAPSULE ORAL EVERY OTHER DAY
COMMUNITY

## 2020-01-01 RX ORDER — VANCOMYCIN HYDROCHLORIDE 125 MG/1
250 CAPSULE ORAL 4 TIMES DAILY
Status: DISCONTINUED | OUTPATIENT
Start: 2020-01-01 | End: 2020-01-01

## 2020-01-01 RX ADMIN — SODIUM CHLORIDE 1000 ML: 9 INJECTION, SOLUTION INTRAVENOUS at 21:24

## 2020-01-01 RX ADMIN — CIPROFLOXACIN 400 MG: 2 INJECTION, SOLUTION INTRAVENOUS at 23:53

## 2020-01-01 RX ADMIN — VANCOMYCIN HYDROCHLORIDE 500 MG: 250 CAPSULE ORAL at 08:11

## 2020-01-01 RX ADMIN — METRONIDAZOLE 500 MG: 500 INJECTION, SOLUTION INTRAVENOUS at 14:08

## 2020-01-01 RX ADMIN — HYDROMORPHONE HYDROCHLORIDE 0.3 MG: 1 INJECTION, SOLUTION INTRAMUSCULAR; INTRAVENOUS; SUBCUTANEOUS at 03:37

## 2020-01-01 RX ADMIN — METRONIDAZOLE 500 MG: 500 INJECTION, SOLUTION INTRAVENOUS at 20:07

## 2020-01-01 RX ADMIN — METRONIDAZOLE 500 MG: 500 INJECTION, SOLUTION INTRAVENOUS at 02:44

## 2020-01-01 RX ADMIN — METRONIDAZOLE 500 MG: 500 INJECTION, SOLUTION INTRAVENOUS at 20:36

## 2020-01-01 RX ADMIN — SODIUM CHLORIDE 1000 ML: 9 INJECTION, SOLUTION INTRAVENOUS at 20:31

## 2020-01-01 RX ADMIN — VANCOMYCIN HYDROCHLORIDE 500 MG: 250 CAPSULE ORAL at 20:33

## 2020-01-01 RX ADMIN — TAMSULOSIN HYDROCHLORIDE 0.4 MG: 0.4 CAPSULE ORAL at 08:11

## 2020-01-01 RX ADMIN — SODIUM CHLORIDE: 9 INJECTION, SOLUTION INTRAVENOUS at 21:30

## 2020-01-01 RX ADMIN — HYDROMORPHONE HYDROCHLORIDE 0.3 MG: 1 INJECTION, SOLUTION INTRAMUSCULAR; INTRAVENOUS; SUBCUTANEOUS at 09:33

## 2020-01-01 RX ADMIN — VANCOMYCIN HYDROCHLORIDE 250 MG: 250 CAPSULE ORAL at 09:17

## 2020-01-01 RX ADMIN — METRONIDAZOLE 500 MG: 500 INJECTION, SOLUTION INTRAVENOUS at 01:44

## 2020-01-01 RX ADMIN — SODIUM CHLORIDE: 9 INJECTION, SOLUTION INTRAVENOUS at 09:14

## 2020-01-01 RX ADMIN — TAMSULOSIN HYDROCHLORIDE 0.4 MG: 0.4 CAPSULE ORAL at 20:25

## 2020-01-01 RX ADMIN — CIPROFLOXACIN 400 MG: 2 INJECTION, SOLUTION INTRAVENOUS at 00:55

## 2020-01-01 RX ADMIN — VANCOMYCIN HYDROCHLORIDE 500 MG: 250 CAPSULE ORAL at 20:05

## 2020-01-01 RX ADMIN — VANCOMYCIN HYDROCHLORIDE 500 MG: 250 CAPSULE ORAL at 01:44

## 2020-01-01 RX ADMIN — METRONIDAZOLE 500 MG: 500 INJECTION, SOLUTION INTRAVENOUS at 08:11

## 2020-01-01 RX ADMIN — VANCOMYCIN HYDROCHLORIDE 500 MG: 250 CAPSULE ORAL at 02:44

## 2020-01-01 RX ADMIN — METRONIDAZOLE 500 MG: 500 INJECTION, SOLUTION INTRAVENOUS at 09:14

## 2020-01-01 RX ADMIN — VANCOMYCIN HYDROCHLORIDE 500 MG: 250 CAPSULE ORAL at 14:08

## 2020-01-01 RX ADMIN — UMECLIDINIUM 1 PUFF: 62.5 AEROSOL, POWDER ORAL at 08:19

## 2020-01-01 RX ADMIN — HYDROMORPHONE HYDROCHLORIDE 0.5 MG: 1 INJECTION, SOLUTION INTRAMUSCULAR; INTRAVENOUS; SUBCUTANEOUS at 12:46

## 2020-01-01 RX ADMIN — SODIUM CHLORIDE: 9 INJECTION, SOLUTION INTRAVENOUS at 08:11

## 2020-01-01 RX ADMIN — FAMOTIDINE 20 MG: 10 INJECTION, SOLUTION INTRAVENOUS at 20:07

## 2020-01-01 RX ADMIN — SODIUM CHLORIDE 1000 ML: 9 INJECTION, SOLUTION INTRAVENOUS at 16:58

## 2020-01-01 RX ADMIN — HYDROMORPHONE HYDROCHLORIDE 0.5 MG: 1 INJECTION, SOLUTION INTRAMUSCULAR; INTRAVENOUS; SUBCUTANEOUS at 12:01

## 2020-01-01 RX ADMIN — FAMOTIDINE 20 MG: 10 INJECTION, SOLUTION INTRAVENOUS at 20:24

## 2020-01-01 ASSESSMENT — PATIENT HEALTH QUESTIONNAIRE - PHQ9: SUM OF ALL RESPONSES TO PHQ QUESTIONS 1-9: 4

## 2020-01-01 ASSESSMENT — ACTIVITIES OF DAILY LIVING (ADL)
ADLS_ACUITY_SCORE: 16
ADLS_ACUITY_SCORE: 16
ADLS_ACUITY_SCORE: 19
ADLS_ACUITY_SCORE: 17
ADLS_ACUITY_SCORE: 17
ADLS_ACUITY_SCORE: 16
ADLS_ACUITY_SCORE: 19
ADLS_ACUITY_SCORE: 19
ADLS_ACUITY_SCORE: 17
ADLS_ACUITY_SCORE: 19
ADLS_ACUITY_SCORE: 16
ADLS_ACUITY_SCORE: 19

## 2020-01-01 ASSESSMENT — MIFFLIN-ST. JEOR
SCORE: 1178.92
SCORE: 1176.66
SCORE: 1158.06
SCORE: 1178.92
SCORE: 1203.43
SCORE: 1151.71
SCORE: 1211.14
SCORE: 1209.78

## 2020-10-26 PROBLEM — A04.72 CLOSTRIDIUM DIFFICILE COLITIS: Status: ACTIVE | Noted: 2020-01-01

## 2020-10-26 NOTE — TELEPHONE ENCOUNTER
Hospitalist Huddle Documentation:    Acuity/Preferred Bed Type: inpatient  Infection Concerns: diverticulitis with pneumatosis  Additional Specialist Needed Or Specialist Already Contacted: General surgery   Timely Treatments Needed: Yes: IV antibiotics   Important things to know/address during hospitalization: none     Received signout from Dr. Granger at [facility] ED. the patient has a past medical history significant for cancer and recently underwent chemotherapy 1 month ago.  He presented to the ED today for evaluation of nausea, vomiting, diarrhea, and abdominal pain.  The patient's initial vital signs showed soft blood pressures in the 90s systolically with improvement into the 110s with receiving IV fluids.  The remainder the patient's vital signs showed him to be afebrile, tachycardic in the 110-115's, and no hypoxia.  The patient's metabolic panel showed a creatinine of 3.8 which is elevated above his baseline of 1.3-1.4 and a glucose of 180 otherwise remainder WNL. CBC showed a leukocytosis of 38,000. The patient had a CT scan of his abdomen/pelvis showing diverticulitis of cecum and sigmoid colon with colitis and pneumatosis of asending colon to cecum. General surgery and GI saw the patient in the ED with no plans for urgent surgical management. The patient received IV Vancomycin and IV Zosyn while in the ED.  The patient had a COVID test sent that is pending.    The patient will be admitted to an inpatient medical telemetry bed for ongoing treatment of sepsis secondary to diverticulitis.    Yu Marques PA-C

## 2020-10-26 NOTE — PROGRESS NOTES
Pt arrived to unit VIA EMS w/ soft BPs- transferred to bed from cart. MD notified r/t soft BPs w/ map of 61. Verbal given for NS bolus started through accessed port. Will continue to monitor.

## 2020-10-27 NOTE — PLAN OF CARE
"/64 (BP Location: Left arm)   Pulse 123   Temp 96.8  F (36  C) (Axillary)   Resp 20   Ht 1.626 m (5' 4\")   Wt 57.7 kg (127 lb 4.8 oz)   SpO2 96%   BMI 21.85 kg/m    ORIENTATION:A/Ox4  VS:BPs soft, HR tachy 120s  PAIN:Denies  IV/LINES: Port a cath to left chest infusing NS bolus   RESPIRATORY:LS- diminished, BYERS/SOB, currently on 2L per NC   GI:hypoactive BS, denies nausea, loose stools- cdiff precautions  :Straight caths per self, 50 ml out,  BS for 25 ml  SKIN:Bruised  ACTIVITY:SBA and gait belt  DIET:NPO w/ ice and meds  PLAN:Continue IVF, IV flagyl, PO vanco, colorectal consulted, continue POC,    "

## 2020-10-27 NOTE — PROVIDER NOTIFICATION
Paged MD for plan for low urine output.   MD stated may need boluses occasionally to maintain BP. Continue to monitor urine output and page MD if <30 ml/hr with routine catheterization. Page MD if BP is <90/50.

## 2020-10-27 NOTE — PROGRESS NOTES
[facility]    Medicine Progress Note - Hospitalist Service       Date of Admission:  10/26/2020  Assessment & Plan       Mr. Moeller is a 79-year-old male with a history of lung cancer on chemotherapy, history of chronic kidney disease.  He presented to an outside facility with several days of diarrhea.  Found to have C. difficile colitis.    1.  Acute, severe C. difficile colitis.  Appreciate colorectal surgery input.  Continue n.p.o. diet status for now.  Continue IV fluids.  Continue oral vancomycin and IV metronidazole.  Also currently on IV ciprofloxacin.    2.  Severe sepsis.  As evidenced by hypotension, tachycardia, significant leukocytosis, and acute endorgan damage with acute kidney injury.  Suspect that this is due to C. difficile colitis.  Continue antibiotics as noted above including IV ciprofloxacin initially to cover for possible other bacterial source of sepsis.  SARS-CoV-2 PCR negative.    3.  Acute kidney injury on chronic kidney disease.  Continue IV fluids.  Recheck metabolic panel tomorrow.  Avoid nephrotoxins as able.    4.  History of lung cancer.  Will need to follow-up with oncology in the outpatient setting as previously scheduled.  Chemotherapy will need to be on hold at this time due to severe sepsis from C. difficile colitis.    5.  COPD.  No acute exacerbation.  Continue to troponin.  Albuterol as needed.    6.  BPH.  Continue tamsulosin.    7.  Weakness.  Physical therapy consult.       Diet: NPO for Medical/Clinical Reasons Except for: Meds, Ice Chips    DVT Prophylaxis: Pneumatic Compression Devices  Sykes Catheter: not present  Code Status: No CPR- Pre-arrest intubation OK           Disposition Plan   Expected discharge: 4 - 7 days    Jose Whittaker DO  Hospitalist Service  [facility]  Contact information available via [facility] Paging/Directory    ______________________________________________________________________    Interval  History   No diarrhea this morning.  Numbness chills overnight.  Still having nausea.  Feels very weak.  Denies chest pain, shortness of breath.    Data reviewed today: I reviewed all medications, new labs and imaging results over the last 24 hours.     Physical Exam   Vital Signs: Temp: 97.3  F (36.3  C) Temp src: Axillary BP: 105/58 Pulse: 116   Resp: 18 SpO2: 96 % O2 Device: Nasal cannula Oxygen Delivery: 2 LPM  Weight: 128 lbs 11.2 oz  Gen:  NAD, A&Ox3.  Eyes:  PERRL, sclera anicteric.  OP:  MMM, no lesions.  Neck:  Supple.  CV:  Regular, no murmurs.  Lung:  CTA b/l, normal effort.  Ab:  +BS, soft.  Skin:  Warm, dry to touch.  No rash.  Ext:  No pitting edema LE b/l.      Data   Recent Labs   Lab 10/27/20  0610 10/26/20  2012   WBC 29.7* 31.7*   HGB 10.2* 10.9*   MCV 88 90    368    136   POTASSIUM 3.7 3.8   CHLORIDE 107 106   CO2 14* 15*   BUN 63* 61*   CR 3.57* 3.87*   ANIONGAP 14 15*   ALLEN 6.7* 7.0*   * 157*

## 2020-10-27 NOTE — PLAN OF CARE
A&O. Denies pain. LS diminished. BYERS. O2 94% on 2 L. Does not use home O2. Bowel sounds hypoactive, complains of some abdominal discomfort/distention. Port to left chest,  ml/hr. SBA with ambulation. Patient straight caths self. Enteric precautions maintained.

## 2020-10-27 NOTE — H&P
Admitted:     10/26/2020      CHIEF COMPLAINT:  Diarrhea.  Patient evaluated at [facility] and found to have severe colitis on CT imaging.  Clostridium difficile toxin has come back positive.      HISTORY OF PRESENT ILLNESS:  Mr. Moeller is a 79-year-old male with a history of lung cancer on chemotherapy, history of chronic kidney disease, who presented to the hospital today for an approximately 10-14 day history of symptoms.  Initially he developed symptoms about 2 weeks ago.  He reports that he was having more frequent formed bowel movements.  His symptoms progressed to developing some liquid stools.  After several more days stool became completely liquid having up to 10 bowel movements per day.  He contacted his providers and was told to try Imodium to treat his symptoms.  With Imodium he became constipated with minimal bowel movements.  Despite this, he continued to feel poorly.  He has been eating minimally.  Given his ongoing symptoms, he came to the ER at [facility] today for evaluation.      The patient was noted to be hypotensive on arrival to [facility], which responded to IV fluid resuscitation.  His workup at [facility] included labs notable for sodium of 133, lactic acid of 1.6, creatinine 3.8, BUN of 60, albumin of 1.8, normal AST, ALT and bilirubin.  Of note, his initial lactic acid was 2.3 and normalized to 1.6 after fluid resuscitation.  Additional lab work included a CBC notable for a white blood cell count of 39,000 and hemoglobin 11.3.  Most recent hemoglobin was 8.9 approximately 3 weeks ago.  He had a significant left shift with neutrophils of 96%.  Platelet count is normal.  Further studies included Clostridium difficile toxin that was found to be positive.      COVID-19 testing was done and was found to be negative.      Abdominal pelvic CT scan was performed which showed marked circumferential mural edema and pericolonic edema from the cecum to the descending colon.  Increase mural and  intraluminal density in the cecum and ascending colon could indicate blood products.  Also in the cecum and ascending colon are very few foci of gas adjacent to the bowel wall, which could be either intraluminal or early pneumatosis.  Generalized mesenteric edema and small volume ascites throughout the abdomen and pelvis.  No bowel obstruction.      [facility] called our hospital for admission of this patient to our facility.  [facility] provider talked to one of our physician's assistants in the Hospitalist Service earlier today who accepted the patient in transfer.      When the patient arrived to the hospital, he was noted to be hypotensive with blood pressures in the 70s.  He has been administered IV fluid bolus and pressures are improving.      The patient currently feels well with no significant complaints.  He is awake, alert, talkative and provides a good history.      PAST MEDICAL HISTORY:   1.  History of nonsmall cell lung cancer diagnosed 07/2019.  The patient follows with Dr. Bartholomew of Oncology at [facility].  His last chemotherapy was 1 month ago.  He is not sure what type of chemotherapy he is currently receiving.  He did not have any surgery on his lung cancer, but he reports it was confined to his right lung without known metastatic disease per his report.  He has, however, required a thoracentesis for pleural effusion previously.   2.  Chronic kidney disease.  Baseline creatinine appears to be near 1.5 historically on chart review.   3.  Hypertension.   4.  Peripheral vascular disease.   5.  Reflux disease.   6.  Chronic obstructive pulmonary disease, not currently on home oxygen.      CURRENT MEDICATIONS:  Await Pharmacy reconciliation of medication list.      ALLERGIES:  NONE.      FAMILY HISTORY:  Reviewed.  Nothing contributory to this admission.      SOCIAL HISTORY:  The patient is a former smoker, having quit 5 years ago.  No significant alcohol use.  I reviewed code status with him, he wishes  to be a do not resuscitate, but would accept intubation at least for a short time if needed.      REVIEW OF SYSTEMS:  See HPI for details.  Comprehensive greater than 10-point review of systems is otherwise negative besides that detailed above.      PHYSICAL EXAMINATION:   VITAL SIGNS:  Blood pressure is currently 113/54 with a heart rate of 116.  No fever.  Saturation 96% on 2 liters of oxygen.   GENERAL:  The patient appears nontoxic and in no acute distress.  He appears awake, alert and oriented x 3.  He is a good historian.  Appears much better than his labs would indicate.   HEAD AND NECK EXAMINATION:  Head is atraumatic.  Sclerae are white.  Eyelids normal.  Conjunctivae normal.  Extraocular movements are intact.  Neck supple.  No cervical or supraclavicular lymphadenopathy.   HEART:  Tachycardic.  Rhythm is regular.  No significant murmurs.  No lower extremity edema.   LUNGS:  Clear to auscultation bilaterally.  No intercostal retractions.  No conversational dyspnea.   ABDOMEN:  Notable for minimal tenderness to palpation.  Hypoactive bowel sounds are present.  Abdomen is moderately distended.  No rebound.  No guarding.  No organomegaly.   EXTREMITIES:  No edema.   SKIN:  Exam reveals no rashes.  No jaundice.  Skin is dry to touch.   NEUROLOGIC:  Cranial nerves II-XII are intact.  Moves all extremities appropriately.  Sensation intact to light touch in the upper and lower extremities bilaterally.   PSYCHIATRIC:  The patient is awake, alert and oriented x 3.  Mood and affect are normal and appropriate.      LABORATORY AND IMAGING DATA:  Reviewed above in HPI.  See Care Everywhere for details of CT scan results and lab results from [facility] earlier today.      IMPRESSION AND PLAN:  Mr. Moeller is a 79-year-old male with a history of right-sided lung cancer and chemotherapy, last dose 1 month ago; chronic kidney disease, baseline creatinine near 1.5; recent diagnosis of pneumonia over Labor Day for which he  received antibiotics, who presented to [facility] ER for concerns about ongoing diarrhea symptoms, abdominal pain, poor appetite, and generally feeling unwell with poor oral intake.      Workup at [facility] included labs notable for creatinine of 3.8 indicative of acute renal failure, severe leukocytosis at 39,000, and CT imaging consistent with severe colitis.  Clostridium difficile toxin has returned positive.  COVID-19 testing was negative.   1.  Severe acute colitis due to Clostridium difficile colitis.   2.  Severe dehydration related to above.   3.  Acute renal failure related to dehydration.   4.  Chronic kidney disease, stage III, with baseline creatinine near 1.5 historically.   5.  Hypertension history, currently hypotensive due to dehydration and infection.   6.  History of peripheral vascular disease.   7.  History of lung cancer, on chemotherapy (last given 1 month ago).      PLAN:   1.  Admit to inpatient service.  Anticipate greater than 2 midnights in the hospital, likely here for 4-6 days or longer.   2.  Oral vancomycin 500 mg every 6 hours.  I am using an increased dose given the severity of C. diff and colitis.   3.  Flagyl 500 mg IV q.6h.  Being given in conjunction with vancomycin due to severity of colitis.   4.  For now we will cover with IV ciprofloxacin given potential for pneumatosis seen on CT imaging.  I suspect this can be stopped soon and ideally would stop as soon as we can given his current C. diff infection.  If Colorectal Surgery feels Cipro is not necessary, would stop at that point.   5.  Repeat CBC, BMP, lactic acid, and we will obtain blood cultures stat.   6.  Repeat labs in the morning.   7.  Generous IV fluid supplementation will be required.  The patient is responding to 1 liter IV fluid so far.  I have ordered a second liter bolus and then we can run maintenance fluids at 150 mL per hour thereafter.  I would anticipate likely a need for 1 or 2 additional fluid boluses  overnight, which would be given if patient becomes hypotensive or has signs of dehydration with poor urine output.   8.  For now can remain on medicine floor; however, if he has recurrent issues with hypotension, unresponsive to fluid resuscitation or clinically worsens, would have low threshold for transfer to ICU.   9.  I have not talked to her Colorectal Surgery about this patient, but again if he clinically worsens would consider contacting them.  I am hoping we can treat with antibiotic therapy and avoid a colectomy in this situation.  Clinically the patient appears much better than his labs would indicate currently.   10.  Prophylaxis will be with Pneumoboots for now just in case patient worsens and surgery required.   11.  Code status is do not resuscitate.   12.  The patient has a port for access.         MO ROBERTS MD             D: 10/26/2020   T: 10/26/2020   MT: BRYANNA      Name:     EMI ADAMS   MRN:      [id number]        Account:      [id number]   :      1940        Admitted:     10/26/2020                   Document: [id number]

## 2020-10-27 NOTE — PHARMACY-ADMISSION MEDICATION HISTORY
Admission medication history interview status for this patient is complete. See [facility] admission navigator for allergy information, prior to admission medications and immunization status.     Medication history interview done via telephone during Covid-19 pandemic, indicate source(s): N/A  Medication history resources (including written lists, pill bottles, clinic record): Medication history completed by Naida Guerra, Pharm.D., at [facility] [see detailed note via Care Everywhere]    Changes made to PTA medication list:  Added: ALL      Actions taken by pharmacist (provider contacted, etc):None     Additional medication history information:None    Medication reconciliation/reorder completed by provider prior to medication history?  No      Prior to Admission medications    Medication Sig Last Dose Taking? Auth Provider   acetaminophen (TYLENOL) 500 MG tablet Take 500 mg by mouth every 6 hours as needed Past Week at Unknown time Yes Unknown, Entered By History   cholecalciferol 25 MCG (1000 UT) TABS Take 1,000 Units by mouth daily Past Week at Unknown time Yes Unknown, Entered By History   cilostazol (PLETAL) 100 MG tablet Take 100 mg by mouth 2 times daily 10/25/2020 at Unknown time Yes Unknown, Entered By History   ferrous sulfate (SLO-FE) 142 (45 Fe) MG CR tablet Take 45 mg by mouth daily (with breakfast) 10/25/2020 at Unknown time Yes Unknown, Entered By History   fluticasone (FLONASE) 50 MCG/ACT nasal spray Spray 1 spray in nostril daily Past Week at Unknown time Yes Unknown, Entered By History   HYDROcodone-acetaminophen (NORCO) 5-325 MG tablet Take 1 tablet by mouth every 6 hours as needed Past Month at Unknown time Yes Unknown, Entered By History   Lactobacillus Rhamnosus, GG, ( PROBIOTIC DIGESTIVE CARE) CAPS Take 1 capsule by mouth every evening Past Week at Unknown time Yes Unknown, Entered By History   loratadine (CLARITIN) 10 MG tablet Take 10 mg by mouth daily Past Week at Unknown time Yes Unknown,  Entered By History   melatonin 1 MG TABS tablet Take 3 mg by mouth nightly as needed Past Week at Unknown time Yes Unknown, Entered By History   metoprolol tartrate (LOPRESSOR) 25 MG tablet Take 25 mg by mouth 2 times daily 10/26/2020 at x1 Yes Unknown, Entered By History   prochlorperazine (COMPAZINE) 10 MG tablet Take 10 mg by mouth every 6 hours as needed (breakthrough nausea/vomiting)  Past Week at Unknown time Yes Unknown, Entered By History   tamsulosin (FLOMAX) 0.4 MG capsule Take 0.4 mg by mouth every other day Past Week at Unknown time Yes Unknown, Entered By History   tiotropium (SPIRIVA) 18 MCG inhaled capsule Inhale 2 puffs into the lungs daily 10/25/2020 at Unknown time Yes Unknown, Entered By History   albuterol (PROAIR HFA/PROVENTIL HFA/VENTOLIN HFA) 108 (90 Base) MCG/ACT inhaler Inhale 2 puffs into the lungs every 6 hours as needed for wheezing More than a month at Unknown time  Unknown, Entered By History           Naida Guerra, PharmD  10/26/2020 1:30 PM

## 2020-10-27 NOTE — CONSULTS
[facility]  Colon and Rectal Surgery Consult Note  Name: Jared Moeller    MRN: [id number]  YOB: 1940    Age: 79 year old  Date of admission: 10/26/2020  Primary care provider: No primary care provider on file.     Requesting Physician:  Dr. Choi   Reason for consult:  Clostridium difficile colitis            History of Present Illness:   Jared Moeller is a 79 year old male, seen at the request of Dr. Choi,with past medical history of stage IIIB lung cancer on chemotherapy and chronic kidney disease, who is admitted with diarrhea and dehydration.  He has had symptoms for about 14 days.  Initially he just had more frequent formed stools, then liquid stools, and then profuse watery diarrhea with up to 10 stools per day.  He did have some incontinence and urgency with the stools.  He has had a decreased appetite for many days.  He also reports some bloating.  He tried Imodium which slowed the stools down but he continued to feel poorly.  No fevers or chills.  No bloody stools.  His last chemo infusion was on October 2.  He was scheduled to have his 5th cycle of Docetaxel on October 23 but this was cancelled because he did not feel well. He initially presented to [facility] and was transferred here for admission.     He was hypotensive at the ER and on transport here.  Most recently his pressures have been 100s/60s  WBC was 31.7 in the ER and 29.7 this morning  Hemoglobin 10.2  Creatinine 3.87 --> 3.57  Lactic acid: 1.9    He has been started on oral Vanco and IV Cipro and Flagyl.      Colonoscopy History:  From chart review it looks like he had a colonoscopy in 2011 but the report is not available     Surgical History: no abdominal surgery             Past Medical History:   No past medical history on file.          Past Surgical History:   No past surgical history on file.            Social History:     Social History     Tobacco Use     Smoking status: Not on file  "  Substance Use Topics     Alcohol use: Not on file             Family History:   No family history on file.          Allergies:     Allergies   Allergen Reactions     Environmental [Hornets] Anaphylaxis             Medications:       ciprofloxacin  400 mg Intravenous Q24H     famotidine  20 mg Intravenous Q24H     metroNIDAZOLE  500 mg Intravenous Q6H     sodium chloride (PF)  3 mL Intracatheter Q8H     tamsulosin  0.4 mg Oral Daily     vancomycin  500 mg Oral Q6H             Review of Systems:   A comprehensive greater than 10 system review of systems was carried out.  Pertinent positives and negatives are noted above.  Otherwise negative for contributory info.            Physical Exam:     Blood pressure 106/62, pulse 120, temperature 97.3  F (36.3  C), temperature source Axillary, resp. rate 18, height 1.626 m (5' 4\"), weight 58.4 kg (128 lb 11.2 oz), SpO2 94 %.    Intake/Output Summary (Last 24 hours) at 10/27/2020 1000  Last data filed at 10/26/2020 2310  Gross per 24 hour   Intake --   Output 50 ml   Net -50 ml       EXAM:  GEN: Awake alert and oriented, appears stated age  EYES: pupils equal and round, sclerae anicteric   PULM: Non-labored breathing, on 2 L oxygen via nasal cannula   CVS: reg rhythm, tachycardic, no peripheral edema  ABD: Soft, minimal diffuse tenderness, moderately distended. No rebound or guarding   NEURO: CN II-XII grossly intact  MSK: extremeties with no clubbing, cyanosis or edema  PSYCH: responsive, alert, cooperative; oriented x3; appropriate mood and affect  EXT/SKIN: inspection reveals no rashes, lesions or ulcers, normal coloring         Data Reviewed:     INDICATION: diffuse abd pain, cancer patient, dark diarrhea, pt reports abd distention and concern for sbo  COMPARISON: 08/29/2020 CTA chest and 08/27/2020 CT chest, abdomen and pelvis  TECHNIQUE: CT scan of the abdomen and pelvis was performed without oral or IV contrast. Multiplanar reformats were obtained. Dose reduction " techniques were used.  CONTRAST: None.    FINDINGS:   LOWER CHEST: Interval removal right tunneled pleural drainage catheter. Pleural thickening throughout the inferior right hemithorax and peripheral soft tissue density nodularity in the right lower lobe stable. Stable 8-9 mm left lower lobe nodule (image   19). Stable right cardiophrenic angle lymph node enlargement stable mild pericardial fluid and/or thickening.    HEPATOBILIARY: Dilatation of the otherwise normal-appearing gallbladder has developed. No calcified gallstones. No bile duct dilatation. Liver unremarkable with no liver lesions identified.    PANCREAS: Normal.    SPLEEN: Normal.    ADRENAL GLANDS: Normal.    KIDNEY/BLADDER: Normal.    BOWEL: Marked circumferential mural edema and pericolic edema in a contiguous fashion from the cecum to the distal descending colon most marked in the cecum and ascending colon. Increased mural and intraluminal density in the cecum and ascending colon   could indicate blood products. Also in the cecum and ascending colon are a few foci of gas adjacent to the bowel wall which could be either intraluminal or early pneumatosis. Generalized mesenteric edema and small volume ascites throughout the abdomen   and pelvis. No bowel obstruction. Colonic diverticulosis.    LYMPH NODES: No lymphadenopathy.    VASCULATURE: Extensive amount of calcified atheromatous plaque throughout the normal caliber abdominal aorta and at the origin of the renal and mesenteric arteries.    PELVIC ORGANS: Small volume abdominal and pelvic ascites as described above. Fluid density enlargement of the left hemiscrotum unchanged and could represent a large epididymal cyst and/or hydrocele.    MUSCULOSKELETAL: Unremarkable.    IMPRESSION:   1.  CT findings of a severe nonspecific acute colitis in a contiguous fashion from the cecum to the distal descending colon with findings most marked in the cecum and ascending colon. Increased mural and intraluminal  density and a a few foci of gas in   the ascending colon and cecum are indeterminate for blood products and minimal pneumatosis, respectively. Etiology could be ischemic or infectious.  2.  Extensive amount of calcified atheromatous plaque throughout the normal caliber abdominal aorta and at the origin of the renal and mesenteric arteries.  3.  Dilatation of the otherwise normal-appearing gallbladder has developed.  4.  Pleural thickening inferior right hemithorax, soft tissue nodularity in the right lower lobe, right cardiophrenic angle lymph node enlargement and left lower lobe pulmonary nodule stable.      Recent Labs   Lab 10/27/20  0610 10/26/20  2012   WBC 29.7* 31.7*   HGB 10.2* 10.9*   HCT 32.8* 35.4*   MCV 88 90    368     Recent Labs   Lab 10/27/20  0610 10/26/20  2012    136   POTASSIUM 3.7 3.8   CHLORIDE 107 106   CO2 14* 15*   ANIONGAP 14 15*   * 157*   BUN 63* 61*   CR 3.57* 3.87*   GFRESTIMATED 15* 14*   GFRESTBLACK 18* 16*   ALLEN 6.7* 7.0*         Assessment and Plan:   Art is a 80yo male with chronic kidney disease and stage IIIB lung cancer currently on chemotherapy (last dose of Docetaxel on 10/2) admitted with severe C diff colitis and dehydration.  He has been started on oral Vanco and IV Cipro/Flagyl.  He is having minimal pain currently. He is tachycardic with soft blood pressures which has improved with fluid resuscitation. Briefly reviewed with him that should he not improve with antibiotics, bowel rest, and IV fluids, surgery would be recommended which would likely be a total colectomy with end ileostomy.  He is not sure he would want to pursue a surgery.       Plan:  1. Surgery: following closely but no plans for surgery at this moment   2. Diet: NPO  3. IV Fluids: continue generous fluid resuscitation   4. Antibiotics:  Continue Vanco, Flagyl and Cipro   5. Medications: Continue home meds  6. I&O s:  strict I&O s  7. Labs:   - Reviewed: by myself and Dr. Allison   -  Ordered: none    8. Imaging:   - Dr. Allison and myself have personally viewed: CT abd/pelvis  - Ordered:  None  9. Activity: ambulate as tolerated, encourage OOB  10. DVT prophylaxis: SCD s  11. This plan has been discussed with Dr. Allison     Patient specific identified risk factors considered as part of today s evaluation include: chronic kidney disease, lung cancer currently on chemotherapy      Additional history obtained from Care Everywhere.  Time spent on consultation: 40 minutes, greater than 50 percent of the total encounter time is spent in counseling and/or coordination of care          Niesah Gold PA-C  [facility]  Phone:  [phone]

## 2020-10-27 NOTE — PROVIDER NOTIFICATION
Patient straight cathed self now for 100 ml dark kwan, odorous urine. Pt last cathed himself at 2300 last noc for 50 ml. Bladder scanned at 0640 this morning  for 59 ml. Please advise for low urine output.     MD decreased IV fluids by 2 5ml/hr. IV now infusing at 125 ml/hr.

## 2020-10-28 NOTE — PROVIDER NOTIFICATION
md notified: no urine output since days shift. New onset R sided stomach pain. C/O dizziness. Slight change in speech, slow to respond.    Edit: instructed that these are normal findings for this pt. Continue to monitor.

## 2020-10-28 NOTE — PLAN OF CARE
Alert. Disoriented to situation, time. Unable to follow directions to take pills. Complains of abdominal pain, PRN dilaudid given. LS diminished.SOB/BYERS, use of accessory muscles. O2 97% on 3 L. Does not use home O2. Bowel sounds hypoactive, complains of some abdominal discomfort/distention. Port to left chest,  ml/hr. SBA with ambulation. Patient straight caths self. Enteric precautions maintained.  Wife in room.   Entered room pt not responding moaning with repositioning. Changed brief. Administered PRN dilaudid for pain and labored breathing. Checked vitals. BP low. RRT called. Bolus of fluid started, O2 mask applied. Patient changed to DNR /DNI, wife decided she just wanted palliative care. Pt  at 1310.

## 2020-10-28 NOTE — CONSULTS
[facility]    Palliative Care Consultation   Text Page    Date of Admission:  10/26/2020    Assessment & Plan   Jared Moeller is a 79 year old male who was admitted on 10/26/2020. I was asked by Hospitalist Jose Whittaker DO to see the patient for severe C Diff, lung cancer.    Recommendations:  1.  Decisional Capacity -  Unreliable. Patient does not have an advance directive. Per  informed consent policy next of kin should be involved in all consent and decision making. His wife Kiera Moeller is next-of-kin.    2.  Pain- [place]  Hydrocodone-Acetaminophen 5/325 (30 tablets obtained 9/21/2020)  -Hydromorphone 0.5-1mg IV every 1 hour PRN      3. Dyspnea  -Hydromorphone 0.5-1 mg IV every 1 hour PRN  -Oxygen as needed for comfort    4. Comfort focus per wife request  -stopped all medications that are not providing comfort per wife request  -Atropine 1-2 drops every 2 hours PRN  -Lorazepam IV PRN  -Haloperidol IV PRN    5. Spiritual Care- Oriented to Spiritual Health and Social Work Services as part of Palliative Care team.  is following.    6. Care Planning- SW as needed for discharge planning as needed should he be stable to discharge    Goals of Care: code status changed to DNR/DNI comfort focus per wife request and change in condition    Disease Process/es & Symptoms:  Jared Moeller is a 79 year old patient admitted 10/26/2020 with symptoms of dehydration, poor appetite and diarrhea for the two weeks prior. Abdominal CT scan done at [facility] demonstrates severe nonspecific acute colitis in a contiguous fashion from the cecum to the distal descending colon with findings most marked in the cecum and ascending colon. Increased mural and intraluminal density and a a few foci of gas in the ascending colon and cecum are indeterminate for blood products and minimal pneumatosis, respectively. [facility] requested admission to our hospital for sepsis due to severe C Diff  colitis . Colorectal Surgery is following. The patient's medical history is significant for non-small cell lung cancer diagnosed 7/2019 (Chest CT right upper lobe mass and biopsy 12/18/2019) which he is followed by [facility] Oncologist Yasmin Bartholomew MD and had last dose of Docetaxel on October 2; COPD (quit smoking 2015 after 46 year p/h); CKD Stage III (followed by [facility] Dr. Rubin). Colorectal Surgery recommended total colectomy with end ileostomy. This is in the setting of progressive lung cancer and COPD. Family electing comfort approach as his condition has worsened in the last 24 hours.     Findings & plan of care discussed with: Bedside nurse Cassandra Xiong, Dr Whittaker and medical team  Follow-up plan from palliative team: will follow closely today for symptom management and support to spouse  Thank you for involving us in the patient's care.     Lorraine Boothe CNP APRN  Pain and Palliative Care  Office [phone]       Time Spent on this Encounter   Total unit/floor time 89 minutes, time consisted of the following, examination of the patient, reviewing the record and completing documentation. >50% of time spent in counseling and coordination of care.  Time spend counseling with family and medical team consisted of the following topics, goals of care, education about prognosis and symptom management.  Time spent in coordination of care with Bedside Nurse Cassandra xiong and Hospitalist Dr Whittaker.     Reason for Consult   Reason for consult: I was asked by Dr Whittaker to evaluate this patient for Goals of care.    Primary Care Physician   No primary care provider on file.    Chief Complaint   Actively dying, symptom management  Unable to obtain a history from the patient due to critical condition  Electronic Medical record    The following symptoms are noted by patient as concerning to his quality of life.  Dyspnea- current receiving IV fluids, will  stop this due to high risk of fluids overload. PRN hydromorphone available often.     Decision-Making & Goals of Care:  Discussed on October 28, 2020 with Lorraine MCRAE CNP: met with spouse at the bedside urgently as patient had a rapid change in condition. Wife expressed that she was ok with intubated when he first arrived at the hospital and now things have changed and she is asking that we do not intubate him and allow natural death. She wants to focus on comfort and does not want any other interventions that are not for comfort. We discussed IV fluids, medications, antibiotics as well as oxygen. We went over the risk and the benefits. She has elected to stop everything except comfort medications. Her adult children are on the phone and agree with plan of care and stopping all non comfort medications. Questions asked and answered    Patient has decision-making capacity Unreliable due to critical illness  Patient has no known legal document designating a decision maker. Per policy next of kin is the designated decision maker. See System Informed Consent policy for guidance.  Code Status: Do not resusitate / Do not intubate     Past Medical History   I have reviewed this patient's medical history and updated it with pertinent information if needed.   No past medical history on file.    Past Surgical History   I have reviewed this patient's surgical history and updated it with pertinent information if needed.  No past surgical history on file.    Prior to Admission Medications   Prior to Admission Medications   Prescriptions Last Dose Informant Patient Reported? Taking?   HYDROcodone-acetaminophen (NORCO) 5-325 MG tablet Past Month at Unknown time  Yes Yes   Sig: Take 1 tablet by mouth every 6 hours as needed   Lactobacillus Rhamnosus, GG, (RA PROBIOTIC DIGESTIVE CARE) CAPS Past Week at Unknown time  Yes Yes   Sig: Take 1 capsule by mouth every evening   acetaminophen (TYLENOL) 500 MG tablet Past Week at  Unknown time  Yes Yes   Sig: Take 500 mg by mouth every 6 hours as needed   albuterol (PROAIR HFA/PROVENTIL HFA/VENTOLIN HFA) 108 (90 Base) MCG/ACT inhaler More than a month at Unknown time  Yes No   Sig: Inhale 2 puffs into the lungs every 6 hours as needed for wheezing   cholecalciferol 25 MCG (1000 UT) TABS Past Week at Unknown time  Yes Yes   Sig: Take 1,000 Units by mouth daily   cilostazol (PLETAL) 100 MG tablet 10/25/2020 at Unknown time  Yes Yes   Sig: Take 100 mg by mouth 2 times daily   ferrous sulfate (SLO-FE) 142 (45 Fe) MG CR tablet 10/25/2020 at Unknown time  Yes Yes   Sig: Take 45 mg by mouth daily (with breakfast)   fluticasone (FLONASE) 50 MCG/ACT nasal spray Past Week at Unknown time  Yes Yes   Sig: Spray 1 spray in nostril daily   loratadine (CLARITIN) 10 MG tablet Past Week at Unknown time  Yes Yes   Sig: Take 10 mg by mouth daily   melatonin 1 MG TABS tablet Past Week at Unknown time  Yes Yes   Sig: Take 3 mg by mouth nightly as needed   metoprolol tartrate (LOPRESSOR) 25 MG tablet 10/26/2020 at x1  Yes Yes   Sig: Take 25 mg by mouth 2 times daily   prochlorperazine (COMPAZINE) 10 MG tablet Past Week at Unknown time  Yes Yes   Sig: Take 10 mg by mouth every 6 hours as needed (breakthrough nausea/vomiting)    tamsulosin (FLOMAX) 0.4 MG capsule Past Week at Unknown time  Yes Yes   Sig: Take 0.4 mg by mouth every other day   tiotropium (SPIRIVA) 18 MCG inhaled capsule 10/25/2020 at Unknown time  Yes Yes   Sig: Inhale 2 puffs into the lungs daily      Facility-Administered Medications: None     Allergies   Allergies   Allergen Reactions     Environmental [Hornets] Anaphylaxis       Social History   I have updated and reviewed the following Social History Narrative:   Social History     Social History Narrative     Not on file       Family History   I have reviewed this patient's family history and updated it with pertinent information if needed.   No family history on file.    Review of Systems    Review of systems not obtained due to patient factors - critical condition    Physical Exam   Temp:  [95.1  F (35.1  C)-95.4  F (35.2  C)] 95.1  F (35.1  C)  Pulse:  [] 105  Resp:  [20-28] 28  BP: ()/(51-61) 113/61  SpO2:  [89 %-97 %] 97 %  129 lbs 0 oz  GEN:  Unresponsive, with labored breathing and cyanosis of the lips  HEENT:  Normocephalic/atraumatic, no scleral icterus, no nasal discharge  CV:  fermin  RESP: labored breathing as evidence by accessory muscle use  ABD:  Rounded, soft, non-tender/non-distended  SKIN:  Dry to touch, no exanthems noted in the visualized areas.    Psych:  Unresponsive  Pain: no s/s of nonverbal indicators of pain    Data   Results for orders placed or performed during the hospital encounter of 10/26/20   Lactic acid level STAT     Status: None   Result Value Ref Range    Lactate for Sepsis Protocol 1.8 0.7 - 2.0 mmol/L   CBC with platelets differential     Status: Abnormal   Result Value Ref Range    WBC 31.7 (H) 4.0 - 11.0 10e9/L    RBC Count 3.95 (L) 4.4 - 5.9 10e12/L    Hemoglobin 10.9 (L) 13.3 - 17.7 g/dL    Hematocrit 35.4 (L) 40.0 - 53.0 %    MCV 90 78 - 100 fl    MCH 27.6 26.5 - 33.0 pg    MCHC 30.8 (L) 31.5 - 36.5 g/dL    RDW 19.8 (H) 10.0 - 15.0 %    Platelet Count 368 150 - 450 10e9/L    Diff Method Automated Method     % Neutrophils 92.0 %    % Lymphocytes 1.5 %    % Monocytes 3.9 %    % Eosinophils 0.4 %    % Basophils 0.3 %    % Immature Granulocytes 1.9 %    Nucleated RBCs 0 0 /100    Absolute Neutrophil 29.2 (H) 1.6 - 8.3 10e9/L    Absolute Lymphocytes 0.5 (L) 0.8 - 5.3 10e9/L    Absolute Monocytes 1.2 0.0 - 1.3 10e9/L    Absolute Eosinophils 0.1 0.0 - 0.7 10e9/L    Absolute Basophils 0.1 0.0 - 0.2 10e9/L    Abs Immature Granulocytes 0.6 (H) 0 - 0.4 10e9/L    Absolute Nucleated RBC 0.0    Basic metabolic panel     Status: Abnormal   Result Value Ref Range    Sodium 136 133 - 144 mmol/L    Potassium 3.8 3.4 - 5.3 mmol/L    Chloride 106 94 - 109 mmol/L     Carbon Dioxide 15 (L) 20 - 32 mmol/L    Anion Gap 15 (H) 3 - 14 mmol/L    Glucose 157 (H) 70 - 99 mg/dL    Urea Nitrogen 61 (H) 7 - 30 mg/dL    Creatinine 3.87 (H) 0.66 - 1.25 mg/dL    GFR Estimate 14 (L) >60 mL/min/[1.73_m2]    GFR Estimate If Black 16 (L) >60 mL/min/[1.73_m2]    Calcium 7.0 (L) 8.5 - 10.1 mg/dL   CBC with platelets differential     Status: Abnormal   Result Value Ref Range    WBC 29.7 (H) 4.0 - 11.0 10e9/L    RBC Count 3.72 (L) 4.4 - 5.9 10e12/L    Hemoglobin 10.2 (L) 13.3 - 17.7 g/dL    Hematocrit 32.8 (L) 40.0 - 53.0 %    MCV 88 78 - 100 fl    MCH 27.4 26.5 - 33.0 pg    MCHC 31.1 (L) 31.5 - 36.5 g/dL    RDW 19.8 (H) 10.0 - 15.0 %    Platelet Count 332 150 - 450 10e9/L    Diff Method Automated Method     % Neutrophils 91.3 %    % Lymphocytes 1.9 %    % Monocytes 4.8 %    % Eosinophils 0.0 %    % Basophils 0.3 %    % Immature Granulocytes 1.7 %    Nucleated RBCs 0 0 /100    Absolute Neutrophil 27.1 (H) 1.6 - 8.3 10e9/L    Absolute Lymphocytes 0.6 (L) 0.8 - 5.3 10e9/L    Absolute Monocytes 1.4 (H) 0.0 - 1.3 10e9/L    Absolute Eosinophils 0.0 0.0 - 0.7 10e9/L    Absolute Basophils 0.1 0.0 - 0.2 10e9/L    Abs Immature Granulocytes 0.5 (H) 0 - 0.4 10e9/L    Absolute Nucleated RBC 0.0    Basic metabolic panel     Status: Abnormal   Result Value Ref Range    Sodium 135 133 - 144 mmol/L    Potassium 3.7 3.4 - 5.3 mmol/L    Chloride 107 94 - 109 mmol/L    Carbon Dioxide 14 (L) 20 - 32 mmol/L    Anion Gap 14 3 - 14 mmol/L    Glucose 162 (H) 70 - 99 mg/dL    Urea Nitrogen 63 (H) 7 - 30 mg/dL    Creatinine 3.57 (H) 0.66 - 1.25 mg/dL    GFR Estimate 15 (L) >60 mL/min/[1.73_m2]    GFR Estimate If Black 18 (L) >60 mL/min/[1.73_m2]    Calcium 6.7 (L) 8.5 - 10.1 mg/dL   Lactic acid whole blood     Status: None   Result Value Ref Range    Lactic Acid 1.9 0.7 - 2.0 mmol/L   Comprehensive metabolic panel     Status: Abnormal   Result Value Ref Range    Sodium 138 133 - 144 mmol/L    Potassium 4.2 3.4 - 5.3 mmol/L     Chloride 111 (H) 94 - 109 mmol/L    Carbon Dioxide 15 (L) 20 - 32 mmol/L    Anion Gap 11 3 - 14 mmol/L    Glucose 164 (H) 70 - 99 mg/dL    Urea Nitrogen 79 (H) 7 - 30 mg/dL    Creatinine 3.83 (H) 0.66 - 1.25 mg/dL    GFR Estimate 14 (L) >60 mL/min/[1.73_m2]    GFR Estimate If Black 16 (L) >60 mL/min/[1.73_m2]    Calcium 6.9 (L) 8.5 - 10.1 mg/dL    Bilirubin Total 0.2 0.2 - 1.3 mg/dL    Albumin 1.5 (L) 3.4 - 5.0 g/dL    Protein Total 4.3 (L) 6.8 - 8.8 g/dL    Alkaline Phosphatase 86 40 - 150 U/L    ALT 14 0 - 70 U/L    AST 24 0 - 45 U/L   CBC with platelets     Status: Abnormal   Result Value Ref Range    WBC 25.1 (H) 4.0 - 11.0 10e9/L    RBC Count 3.38 (L) 4.4 - 5.9 10e12/L    Hemoglobin 9.1 (L) 13.3 - 17.7 g/dL    Hematocrit 30.3 (L) 40.0 - 53.0 %    MCV 90 78 - 100 fl    MCH 26.9 26.5 - 33.0 pg    MCHC 30.0 (L) 31.5 - 36.5 g/dL    RDW 20.4 (H) 10.0 - 15.0 %    Platelet Count 304 150 - 450 10e9/L   Lactic acid level STAT     Status: None   Result Value Ref Range    Lactate for Sepsis Protocol 1.2 0.7 - 2.0 mmol/L   Colorectal Surgery IP Consult: Patient to be seen: Routine - within 24 hours; cdiff colitis; Consultant may enter orders: Yes; Requesting provider? Hospitalist (if different from attending physician)     Status: None ()    Niesha White PA-C     10/27/2020 11:21 AM  [facility]  Colon and Rectal Surgery Consult Note  Name: Jared Moeller    MRN: [id number]  YOB: 1940    Age: 79 year old  Date of admission: 10/26/2020  Primary care provider: No primary care provider on file.     Requesting Physician:  Dr. Choi   Reason for consult:  Clostridium difficile colitis            History of Present Illness:   Jared Moeller is a 79 year old male, seen at the request of   Dr. Choi,with past medical history of stage IIIB lung cancer   on chemotherapy and chronic kidney disease, who is admitted with   diarrhea and dehydration.  He has had symptoms for  about 14 days.    Initially he just had more frequent formed stools, then liquid   stools, and then profuse watery diarrhea with up to 10 stools per   day.  He did have some incontinence and urgency with the stools.    He has had a decreased appetite for many days.  He also reports   some bloating.  He tried Imodium which slowed the stools down but   he continued to feel poorly.  No fevers or chills.  No bloody   stools.  His last chemo infusion was on October 2.  He was   scheduled to have his 5th cycle of Docetaxel on October 23 but   this was cancelled because he did not feel well. He initially   presented to [facility] ER and was transferred here for admission.       He was hypotensive at the ER and on transport here.  Most   recently his pressures have been 100s/60s  WBC was 31.7 in the ER and 29.7 this morning  Hemoglobin 10.2  Creatinine 3.87 --> 3.57  Lactic acid: 1.9    He has been started on oral Vanco and IV Cipro and Flagyl.      Colonoscopy History:  From chart review it looks like he had a   colonoscopy in 2011 but the report is not available     Surgical History: no abdominal surgery             Past Medical History:   No past medical history on file.          Past Surgical History:   No past surgical history on file.            Social History:     Social History     Tobacco Use     Smoking status: Not on file   Substance Use Topics     Alcohol use: Not on file             Family History:   No family history on file.          Allergies:     Allergies   Allergen Reactions     Environmental [Hornets] Anaphylaxis             Medications:       ciprofloxacin  400 mg Intravenous Q24H     famotidine  20 mg Intravenous Q24H     metroNIDAZOLE  500 mg Intravenous Q6H     sodium chloride (PF)  3 mL Intracatheter Q8H     tamsulosin  0.4 mg Oral Daily     vancomycin  500 mg Oral Q6H             Review of Systems:   A comprehensive greater than 10 system review of systems was   carried out.  Pertinent positives and  "negatives are noted above.    Otherwise negative for contributory info.            Physical Exam:     Blood pressure 106/62, pulse 120, temperature 97.3  F (36.3  C),   temperature source Axillary, resp. rate 18, height 1.626 m (5'   4\"), weight 58.4 kg (128 lb 11.2 oz), SpO2 94 %.    Intake/Output Summary (Last 24 hours) at 10/27/2020 1000  Last data filed at 10/26/2020 2310  Gross per 24 hour   Intake --   Output 50 ml   Net -50 ml       EXAM:  GEN: Awake alert and oriented, appears stated age  EYES: pupils equal and round, sclerae anicteric   PULM: Non-labored breathing, on 2 L oxygen via nasal cannula   CVS: reg rhythm, tachycardic, no peripheral edema  ABD: Soft, minimal diffuse tenderness, moderately distended. No   rebound or guarding   NEURO: CN II-XII grossly intact  MSK: extremeties with no clubbing, cyanosis or edema  PSYCH: responsive, alert, cooperative; oriented x3; appropriate   mood and affect  EXT/SKIN: inspection reveals no rashes, lesions or ulcers, normal   coloring         Data Reviewed:     INDICATION: diffuse abd pain, cancer patient, dark diarrhea, pt   reports abd distention and concern for sbo  COMPARISON: 08/29/2020 CTA chest and 08/27/2020 CT chest, abdomen   and pelvis  TECHNIQUE: CT scan of the abdomen and pelvis was performed   without oral or IV contrast. Multiplanar reformats were obtained.   Dose reduction techniques were used.  CONTRAST: None.    FINDINGS:   LOWER CHEST: Interval removal right tunneled pleural drainage   catheter. Pleural thickening throughout the inferior right   hemithorax and peripheral soft tissue density nodularity in the   right lower lobe stable. Stable 8-9 mm left lower lobe nodule   (image   19). Stable right cardiophrenic angle lymph node enlargement   stable mild pericardial fluid and/or thickening.    HEPATOBILIARY: Dilatation of the otherwise normal-appearing   gallbladder has developed. No calcified gallstones. No bile duct   dilatation. Liver " unremarkable with no liver lesions identified.    PANCREAS: Normal.    SPLEEN: Normal.    ADRENAL GLANDS: Normal.    KIDNEY/BLADDER: Normal.    BOWEL: Marked circumferential mural edema and pericolic edema in   a contiguous fashion from the cecum to the distal descending   colon most marked in the cecum and ascending colon. Increased   mural and intraluminal density in the cecum and ascending colon   could indicate blood products. Also in the cecum and ascending   colon are a few foci of gas adjacent to the bowel wall which   could be either intraluminal or early pneumatosis. Generalized   mesenteric edema and small volume ascites throughout the abdomen   and pelvis. No bowel obstruction. Colonic diverticulosis.    LYMPH NODES: No lymphadenopathy.    VASCULATURE: Extensive amount of calcified atheromatous plaque   throughout the normal caliber abdominal aorta and at the origin   of the renal and mesenteric arteries.    PELVIC ORGANS: Small volume abdominal and pelvic ascites as   described above. Fluid density enlargement of the left   hemiscrotum unchanged and could represent a large epididymal cyst   and/or hydrocele.    MUSCULOSKELETAL: Unremarkable.    IMPRESSION:   1.  CT findings of a severe nonspecific acute colitis in a   contiguous fashion from the cecum to the distal descending colon   with findings most marked in the cecum and ascending colon.   Increased mural and intraluminal density and a a few foci of gas   in   the ascending colon and cecum are indeterminate for blood   products and minimal pneumatosis, respectively. Etiology could be   ischemic or infectious.  2.  Extensive amount of calcified atheromatous plaque throughout   the normal caliber abdominal aorta and at the origin of the renal   and mesenteric arteries.  3.  Dilatation of the otherwise normal-appearing gallbladder has   developed.  4.  Pleural thickening inferior right hemithorax, soft tissue   nodularity in the right lower lobe, right  cardiophrenic angle   lymph node enlargement and left lower lobe pulmonary nodule   stable.      Recent Labs   Lab 10/27/20  0610 10/26/20  2012   WBC 29.7* 31.7*   HGB 10.2* 10.9*   HCT 32.8* 35.4*   MCV 88 90    368     Recent Labs   Lab 10/27/20  0610 10/26/20  2012    136   POTASSIUM 3.7 3.8   CHLORIDE 107 106   CO2 14* 15*   ANIONGAP 14 15*   * 157*   BUN 63* 61*   CR 3.57* 3.87*   GFRESTIMATED 15* 14*   GFRESTBLACK 18* 16*   ALLEN 6.7* 7.0*         Assessment and Plan:   Art is a 80yo male with chronic kidney disease and stage IIIB   lung cancer currently on chemotherapy (last dose of Docetaxel on   10/2) admitted with severe C diff colitis and dehydration.  He   has been started on oral Vanco and IV Cipro/Flagyl.  He is having   minimal pain currently. He is tachycardic with soft blood   pressures which has improved with fluid resuscitation. Briefly   reviewed with him that should he not improve with antibiotics,   bowel rest, and IV fluids, surgery would be recommended which   would likely be a total colectomy with end ileostomy.  He is not   sure he would want to pursue a surgery.       Plan:  1. Surgery: following closely but no plans for surgery at this   moment   2. Diet: NPO  3. IV Fluids: continue generous fluid resuscitation   4. Antibiotics:  Continue Vanco, Flagyl and Cipro   5. Medications: Continue home meds  6. I&O s:  strict I&O s  7. Labs:   - Reviewed: by myself and Dr. Allison   - Ordered: none    8. Imaging:   - Dr. Allison and myself have personally viewed: CT abd/pelvis  - Ordered:  None  9. Activity: ambulate as tolerated, encourage OOB  10. DVT prophylaxis: SCD s  11. This plan has been discussed with Dr. Allison     Patient specific identified risk factors considered as part of   today s evaluation include: chronic kidney disease, lung cancer   currently on chemotherapy      Additional history obtained from Care Everywhere.  Time spent on   consultation: 40 minutes,  greater than 50 percent of the total   encounter time is spent in counseling and/or coordination of care          Niesha Gold PA-C  [facility]  Phone:  [phone]     Blood culture     Status: None (Preliminary result)    Specimen: Blood    Right Arm   Result Value Ref Range    Specimen Description Blood Right Arm     Culture Micro No growth after 2 days    Blood culture     Status: None (Preliminary result)    Specimen: Blood    Left Arm   Result Value Ref Range    Specimen Description Blood Left Arm     Special Requests Received in aerobic bottle only     Culture Micro No growth after 2 days

## 2020-10-28 NOTE — PROGRESS NOTES
Care Coordination:  Met with  and wife in [facility]. Wife is tearful and states pt has a number of already scheduled appts coming up. Offered to cancel appts for pt/wife and she was receptive.     Calls placed and appts cancelled for upcomin. Oncology and Infusion appts at [facility] in [place]  2. Pulmonology at [facility]  3. Nephrology at [facility] RN BSN CM  Inpatient Care Coordination  [facility]  [phone]

## 2020-10-28 NOTE — PLAN OF CARE
Talked to wife. [place] left on pt per wife. Port a cath de-accessed. Dentures left in mouth. Pt taken down to cooler at 17:41.

## 2020-10-28 NOTE — PROGRESS NOTES
COLON [facility] RECT[facility]  PROGRESS NOTE    October 28, 2020    SUBJECTIVE:  Reports some pain.  Unable to answer many questions.  Unsure of the year.  Did not realize he was in the hospital.  Temperature 95.1.  WBC improved to 25.1.  Creatinine 3.83 (up from 3.57 yesterday).  No urine on second or third shift, and only 100mL out on first shift.  Albumin 1.5.  Had 4 stools recorded in the chart.      OBJECTIVE:  Temp:  [95.1  F (35.1  C)-95.4  F (35.2  C)] 95.1  F (35.1  C)  Pulse:  [] 105  Resp:  [20-28] 28  BP: ()/(51-61) 113/61  SpO2:  [89 %-97 %] 97 %    Intake/Output Summary (Last 24 hours) at 10/28/2020 0917  Last data filed at 10/28/2020 0914  Gross per 24 hour   Intake 6991 ml   Output 100 ml   Net 6891 ml       GENERAL:  Resting comfortably, awakens to voice but unable to answer many questions, spends some time staring off in other direction   RESPIRATORY: using 2 liters nasal canula, using accessory muscles   EXTREMITIES: warm and well perfused  ABDOMEN:  Soft, distended, minimal tenderness diffusely    LABS:  Lab Results   Component Value Date    WBC 25.1 10/28/2020     Lab Results   Component Value Date    HGB 9.1 10/28/2020     Lab Results   Component Value Date    HCT 30.3 10/28/2020     Lab Results   Component Value Date     10/28/2020     Last Basic Metabolic Panel:  Lab Results   Component Value Date     10/28/2020      Lab Results   Component Value Date    POTASSIUM 4.2 10/28/2020     Lab Results   Component Value Date    CHLORIDE 111 10/28/2020     Lab Results   Component Value Date    ALLEN 6.9 10/28/2020     Lab Results   Component Value Date    CO2 15 10/28/2020     Lab Results   Component Value Date    BUN 79 10/28/2020     Lab Results   Component Value Date    CR 3.83 10/28/2020     Lab Results   Component Value Date     10/28/2020       ASSESSMENT/PLAN: 79 year old male with severe C diff colitis and chronic kidney disease in the setting of stage IIIB lung cancer  on chemotherapy.  Yesterday Dr. Allison had a long conversation with patient and his wife regarding his diagnosis and prognosis.  He did not with to pursue surgery at that time and stated that should he clinically decline, he would still not want to pursue surgery  - consider palliative care consult  - pain control  - continue antibiotics  - NPO  - discussed with Dr. Allison       For questions/paging, please contact the CRS office at [phone].    Niesha Gold PA-C  [facility]  Phone: [phone]      [facility] Staff  I performed a history and physical examination of the patient and discussed their management with the physician assistant. I reviewed the physician assistants note and agree with the documented findings and plan of care.     Agree with above.  Clinical decline overnight.  Mental status decline.  Minimal UOP, Cr rising and soft BPS.      Exam  Confused, answers to name but does not answer questions coherently  abd soft, distended, mild diffuse ttp    Plan   Art has had a clinical decline overnight.  Concern his colitis is progressing with impending multiorgan failure. I had a long discussion with Art and his wife yesterday what he would like to do if we were in this situation.  He did not want surgery and we had discussed comfort measures.  At this time I think it is reasonable to get Palliative Care involved and discuss goals of care/comfort measures with his wife.  I discussed this with Dr. Whittaker.     Stefanie Allison MD  [facility]  Office Phone # [phone].

## 2020-10-28 NOTE — PLAN OF CARE
End of Shift Note:  For VS and complete assessments, please see documentation flowsheets.     Pertinent shift assessments:   VSS ex tachycardia (110s). 3 L NC.   Neuro: AxOx4, slow to respond at times.  Vital Signs: stable  Pain: 6/10 in right side of left abdomen  Respiratory: LS diminished, wheezes in lower lobes. BYERS. Accessory muscle use.  GI: Abdomen rounded and firm. Denies tenderness to the touch. NPO ex ice chips.  : No urinary output. MD aware.  Activity: SBA, gait belt.  NS infusing at 125 mL/hr through port on pt left side. Port WDL.   Trace edema to BLE.     Triggered sepsis protocol at 0610. Labs drawn and vitals obtained. Lactate 1.2.    Treatment Plan: Will continue with plan of care.   Discharge: 4-7 days.

## 2020-10-28 NOTE — DEATH PRONOUNCEMENT
MD DEATH PRONOUNCEMENT    Called to pronounce Jared Moeller dead.    Physical Exam: Unresponsive to noxious stimuli, Spontaneous respirations absent, Breath sounds absent, Carotid pulse absent, Heart sounds absent and Pupillary light reflex absent    Patient was pronounced dead at 13:10 PM, October 28, 2020.    Preliminary Cause of Death:   1.  Severe sepsis with septic shock.  2.  C. difficile colitis.    Active Problems:    Clostridium difficile colitis       Infectious disease present?: YES    Communicable disease present? (examples: HIV, chicken pox, TB, Ebola, CJD) :  YES    Multi-drug resistant organism present? (example: MRSA): NO      Body disposition: Wife is currently still deciding on body disposition at this time.

## 2020-10-28 NOTE — PROGRESS NOTES
SPIRITUAL HEALTH SERVICES Progress Note  FR Med Surg 3    Spiritual Health visit per consult order for assistance with healthcare directive.  At time of consult, pt spouse Pat was at the bedside.  Pt condition changed rapidly and pt .    Pt two sons were on speaker phone from out of state.  Provided emotional/spiritual support and prayer of commendation.    Also provided information on  homes.  Pat would like to make calls from home. She will contact nursing station later with decision.    Plan: Spiritual Health Services remains available for additional emotional/spiritual support.    Junaid Morillo MA  Staff   Pager: [phone]  Phone: [phone]

## 2020-10-28 NOTE — DISCHARGE SUMMARY
[facility]  Hospitalist Discharge/Death Summary      Date of Admission:  10/26/2020  Date of Death:  10/28/2020  Discharging Provider: Jose Whittaker DO      Discharge Diagnoses   1.  Severe sepsis with septic shock.  2.  C. difficile colitis.  3.  Acute kidney failure on chronic kidney disease.  4.  Lung cancer.    5.  COPD.        Hospital Course   Mr. Moeller is a 79-year-old male with a history of lung cancer on chemotherapy, history of chronic kidney disease.  He presented to an outside facility with several days of diarrhea.  Found to have C. difficile colitis.  When he first arrived at our facility, he was noted to be hypotensive.  He was started on aggressive IV fluid management.  Blood pressure did respond to aggressive IV fluid bolus.  He was started on antibiotics to treat severe sepsis secondary to his C. difficile colitis with oral vancomycin and IV metronidazole.  He also was started on IV ciprofloxacin to cover possible other sources of sepsis.  Colorectal surgery was involved in his case.  Possible need for surgical intervention was discussed on 10/27.  Patient and wife at that time did not want surgery to be performed.  Unfortunately, he deteriorated very quickly from 10/27 until the morning of 10/28.  He became more lethargic the morning of 10/28.  I did discuss deterioration with his wife, Kirsten.  Patient was unable to make medical decisions on his own.  She did reinforce the decision that no surgery was desired.  A little more than an hour after our conversation, patient became hypotensive again.  A rapid response was called.  I did discuss possibility of starting IV norepinephrine for blood pressure support with Kirsten.  She did not want pressor support.  Did want to move to comfort cares.  Also changed CODE STATUS from previous DNR to DNR/DNI.  Patient did continue to deteriorate quickly.  He did die at 13:10 on 10/28.    Consultations This Hospital Stay   COLORECTAL  SURGERY IP CONSULT  ADVANCE DIRECTIVE IP CONSULT  PHYSICAL THERAPY ADULT IP CONSULT  PALLIATIVE CARE ADULT IP CONSULT    Code Status   No CPR- Do NOT Intubate    Time Spent on this Encounter   I did spend more than 70 minutes in total on patient care with Mr. Moeller and speaking with his wife, Kirsten on 10/28/2020.       Jose Whittaker DO  [place] [address] [facility] SUR[facility]  [address] [facility] [address]  Phone: [phone]  Fax: [phone]  ______________________________________________________________________    Physical Exam From earlier in the day before acute deterioration and eventual death happened.    Vital Signs: Temp: 95.1  F (35.1  C) Temp src: Axillary BP: (!) 63/35 Pulse: 105   Resp: 28 SpO2: 97 % O2 Device: Nasal cannula Oxygen Delivery: 3 LPM  Weight: 129 lbs 0 oz  Gen: Awake but quite lethargic.  Appears critically ill.  Was able to say a few words.  Did not answer any questions for me.  Eyes:  PERRL, sclera anicteric.  CV:  Regular, tachycardic, no loud murmurs.  Lung: Poor air movement b/l,  does have evidence of increased respiratory effort.  Using accessory muscles to assist with breathing.   Ab:  Mildly distended, +BS, soft.  Skin:  Warm, dry to touch.  No rash.  Ext:  No pitting edema LE b/l.         Primary Care Physician   No primary care provider on file.    Discharge Orders   No discharge procedures on file.      Discharge Medications   Current Discharge Medication List      CONTINUE these medications which have NOT CHANGED    Details   acetaminophen (TYLENOL) 500 MG tablet Take 500 mg by mouth every 6 hours as needed      cholecalciferol 25 MCG (1000 UT) TABS Take 1,000 Units by mouth daily      cilostazol (PLETAL) 100 MG tablet Take 100 mg by mouth 2 times daily      ferrous sulfate (SLO-FE) 142 (45 Fe) MG CR tablet Take 45 mg by mouth daily (with breakfast)      fluticasone (FLONASE) 50 MCG/ACT nasal spray Spray 1 spray in nostril daily       HYDROcodone-acetaminophen (NORCO) 5-325 MG tablet Take 1 tablet by mouth every 6 hours as needed      Lactobacillus Rhamnosus, GG, (RA PROBIOTIC DIGESTIVE CARE) CAPS Take 1 capsule by mouth every evening      loratadine (CLARITIN) 10 MG tablet Take 10 mg by mouth daily      melatonin 1 MG TABS tablet Take 3 mg by mouth nightly as needed      metoprolol tartrate (LOPRESSOR) 25 MG tablet Take 25 mg by mouth 2 times daily      prochlorperazine (COMPAZINE) 10 MG tablet Take 10 mg by mouth every 6 hours as needed (breakthrough nausea/vomiting)       tamsulosin (FLOMAX) 0.4 MG capsule Take 0.4 mg by mouth every other day      tiotropium (SPIRIVA) 18 MCG inhaled capsule Inhale 2 puffs into the lungs daily      albuterol (PROAIR HFA/PROVENTIL HFA/VENTOLIN HFA) 108 (90 Base) MCG/ACT inhaler Inhale 2 puffs into the lungs every 6 hours as needed for wheezing    Comments: Pharmacy may dispense brand covered by insurance (Proair, or proventil or ventolin or generic albuterol inhaler)           Allergies   Allergies   Allergen Reactions     Environmental [Hornets] Anaphylaxis

## 2020-10-28 NOTE — PLAN OF CARE
Assumed care of pt [id number]    Code status: DNR, pre-arrest intubation OK  COVID-19 result: Negative    Pertinent assessment: A&Ox4. VSS except HR; tachycardic. LS: Diminished, expiratory wheeze. GI: abdominal discomfort and bloating. No loose stools. : oliguria; pt straight caths himself. Skin: trace edema in BLE. NS infusing @ 125mL/hr. Up SBA with GB. NPO except for ice chips.     Treatment plan: Cont IVF and ABX. Monitor metabolic panel. Colorectal surgery following. See notes. Continue with POC.   Discharge dispo: TBD

## 2020-11-01 LAB
BACTERIA SPEC CULT: NO GROWTH
BACTERIA SPEC CULT: NO GROWTH
Lab: NORMAL
SPECIMEN SOURCE: NORMAL
SPECIMEN SOURCE: NORMAL

## 2021-05-26 ENCOUNTER — RECORDS - HEALTHEAST (OUTPATIENT)
Dept: ADMINISTRATIVE | Facility: CLINIC | Age: 81
End: 2021-05-26

## 2021-05-26 ASSESSMENT — PATIENT HEALTH QUESTIONNAIRE - PHQ9: SUM OF ALL RESPONSES TO PHQ QUESTIONS 1-9: 4

## 2021-05-30 NOTE — PATIENT INSTRUCTIONS - HE
1. Labs today: kidney function, blood count  2. We will contact you in the next 1-2 days about a plan

## 2021-05-30 NOTE — PROGRESS NOTES
"Pulmonary Clinic Consult Note  Date of Service: 7/26/2019    Reason for Consultation: Lung mass    History:     HPI: Patient is a pleasant 78-year-old male, former smoker, who came in for evaluation of lung mass.  Next    Patient lives in [place] in the winter and comes back to [place] in the summer.  He noted that he was found to have a spot in his lungs approximately 3 to 4 years ago and this has been followed closely.  He had a CT scan of the chest 6 months ago back in [place] that did not show any growth.  Recently, he noticed that he had 15 pound weight loss.  He then had a CT scan of the chest in [place] that showed enlargement of right lung mass.  He endorses a cough but denies any hemoptysis.  He denies any night sweats.  The 15 pound weight loss was unrelated to diet.  He denies any history of recurrent infections.  He notes that he has COPD and is on inhalers.  He notes that his COPD is controlled.    PMHx/PSHx:  COPD  Seasonal allergies  PVD  HTN  CKD  H/o polio  H/o inguinal hernia     Social Hx:    Lives in [place] in the winter and [place] in the summer  Used to smoke 1 pack per day and quit 4 years ago    Review of Systems - 10 point review of system negative except for what is mentioned in the HPI.    Meds and Allergies:  See EHR for the updated medication list and Allergies. These were reviewed.     Family Hx:   Family history reviewed and not pertinent.     Exam/Data:   /82   Pulse (!) 106   Resp 12   Ht 5' 6\" (1.676 m)   Wt 120 lb (54.4 kg)   SpO2 95%   BMI 19.37 kg/m      EXAM:  GEN: comfortable, NAD  HEENT: NCAT, EMOI, mmm  LN: no cervical LAD   CVS: S1S2, RRR  Lung: good air entry bilaterally  Abd: soft, nt, + BS. No masses  Ext: no c/c/e  Vasc: intact radial pulses bilaterally  Neuro: nonfocal  Skin: no visible rash  Musculoskeletal: FROM all extremities  Psych: normal affect    DATA    None so far       Assessment/Plan:   Jared Moeller is a 78 y.o. male, former smoker, with " history of lung mass that has been stable over the past several years until most recently showing marked growth.  This is done on CT scans which were done back in [place].  Review of images here show right upper lobe lung mass however due to the imaging cuts and technique being uploaded on our computers, I am unable to further characterize this.  With enlargement, there is concern about malignancy oksana in setting of his tobacco use. Based on CT chest finding, we can discuss bronch/ebus.    Recommendations:   CT chest   PFT's  Consider for bronch/ebus    FOLLOW UP: 1-2 weeks    Lauri Barboza MD  Pulmonary and Critical Care Medicine  7/26/2019        Allergies   Allergen Reactions     Venom-Honey Bee        Medications:     Current Outpatient Medications   Medication Sig Dispense Refill     loratadine (CLARITIN) 10 mg tablet Take 10 mg by mouth daily.       multivitamin therapeutic (THERAGRAN) tablet Take 1 tablet by mouth daily.       telmisartan-hydrochlorothiazide (MICARDIS HCT) 80-12.5 mg per tablet Take 1 tablet by mouth daily.       TIOTROPIUM BROMIDE (SPIRIVA WITH HANDIHALER INHL) Inhale 1 puff daily.       No current facility-administered medications for this visit.        Much or all of the text in this note was generated through the use of the Dragon Dictate voice-to-text software. Errors in spelling or words which seem out of context are unintentional. Sound alike errors, in particular, may have escaped editing.

## 2021-05-30 NOTE — PROGRESS NOTES
Izabela, the Clinic Care Coordination Community Health Worker, reviewed patient chart upon order for Ambulatory Referral to Care Management. [facility] and is therefore ineligible for CCC. Referral has been closed and no outreach will be done at this time.

## 2021-05-30 NOTE — PROGRESS NOTES
Assessment & Plan  1. Hypertension  He has a history of CKD based on prior labs in our system and decreased GFR in the labs done in [place].  We will repeat a CMP prior to repeating any CT.  He has anemia which may be due to chronic kidney disease.    - Comprehensive Metabolic Panel  - HM1(CBC and Differential)  - HM1 (CBC with Diff)    2. 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  I do not know who to refer him to for this problem.  I will send a message to our coordinator to help manage his care.  I referred him to pulmonology with hopes that they could do a biopsy.  I will repeat labs.  I will hold off on repeating imaging because he has a CT that was done only 1 week ago and chronic kidney disease that may make IV contrast dangerous for him if not needed    - Ambulatory referral to Care Management (Primary Care)  - Ambulatory referral to Pulmonology    3. Anemia, unspecified type  Repeat hemoglobin and iron studies.  May be due to chronic disease versus chronic kidney disease.  - Iron and Transferrin Iron Binding Capacity  - Ferritin      Kaylene Sorto MD    Subjective  Chief Complaint:  tumor in lungs (patient has tumor in lung, CT in [place] )    HPI:   Jared Moeller is a 78 y.o. male who presents for a lung mass.  He has a history of COPD and history of tobacco use for many years.  He lives in [place].  He was seen by a physician there where he was treated for COPD.  He had a lung nodule several years ago that had remained stable in size.  He did not have follow-up for a couple years.  He recently had a chest x-ray that showed a mass and a CT that shows a mass.  Has the images on CT CD.  His Dr. Mclaughlin told him that he should be seen in the [place] for biopsy and for diagnosis and treatment.    He reports having weight loss of about 15 pounds.  He does not think there is been any change in his diet.  He denies any fevers or  night sweats.  He denies any chest pain or dyspnea.      He brings in a [place] report of the CT: paratracheal mass about 28V15L00 mm (I cannot understand all of the report)    Based on my understanding of the lab reports which are in [place]:  Uric acid 7.7  2 weeks ago:  Hgb: 11.7  Creatinine:1.9  PSA: 4.43      Allergies:  is allergic to venom-honey bee.    SH/FH:  Social History and Family History reviewed and updated.   Tobacco Status:  He  reports that he quit smoking about 3 years ago. He has a 50.00 pack-year smoking history. He does not have any smokeless tobacco history on file.    Review of Systems:    Constitutional:Weight Change, No Fever, No Chills, No Night Sweats,   ENT/Mouth: tinnitus.   Cardiovascular: No Chest Pain, No SOB  Respiratory: No Cough, No Dyspnea   Gastrointestinal: No Nausea, No Vomiting, No Diarrhea, No Constipation, No Pain,     Objective  Vitals:    07/23/19 1545   BP: 106/58   Pulse: (!) 105   SpO2: 99%   Weight: 120 lb (54.4 kg)       Physical Exam:  GENERAL: Alert, well-appearing, in no acute distress.   PSYCH: Pleasant mood, affect appropriate.  Good eye contact.  SKIN: No apparent lesions, no jaundice  CV: Regular rate and rhythm without murmurs, rubs or gallops. No peripheral edema  RESP: No increased work of breathing.  Lung sounds bilateral, clear, symmetric excursion.

## 2021-05-31 NOTE — TELEPHONE ENCOUNTER
Medication:telmisartan-hydrochlorothiazide (MICARDIS HCT) 80-12.5 mg per tablet    Pharmacy:[facility] [address] [place] [address]    Last Office Visit:7/23/19    Last Refill:n/a    Quantity:n/a    Refills: n/a  ______________________________________________    Medication:TIOTROPIUM BROMIDE (SPIRIVA WITH HANDIHALER INHL)    Pharmacy:[facility] [address] [place] [address]    Last Office Visit:7/23/19    Last Refill:n/a    Quantity:n/a    Refills: n/a    ______________________________________________    Medication:cilostazol (PLETAL) 100 MG tablet    Pharmacy:[facility] [address] [place] [address]      Last Office Visit:7/23/19    Last Refill:n/a    Quantity:n/a    Refills: n/a

## 2021-05-31 NOTE — PROGRESS NOTES
Art was referred over by Dr. Barboza for positive EBUS.  I was able to get him in to see Dr. Lopez 8/20/19.  He is picking up the welcome packet today at [facility] after his visit to the lung clinic.  I introduced my role as a navigator.  I let him know that Mckayla is the thoracic navigator and is out on leave.  He was found to have a spot on his imaging while spending time in [place].  Images are in the system, though the reports are in [place].  All other work up is within [facility].  I encouraged him to contact me if needed for assistance.

## 2021-05-31 NOTE — TELEPHONE ENCOUNTER
Referral Request  Type of referral: kidney specialist  Who s requesting: Patient  Why the request: patient's levels were high, and he wants to be referred   Have you been seen for this request: Yes  Does patient have a preference on a group/provider? no  Okay to leave a detailed message?  Yes

## 2021-05-31 NOTE — CONSULTS
Request: Please place order for  IDM on sputum for PCR. [facility] approved, recommends 2 of 3 specimens be sent to MD, regardless of smear status.Lab is informed of request, need order.

## 2021-05-31 NOTE — PROGRESS NOTES
Patient is here for consultation of lung cancer.  Accompanied by spouse, Kirsten.   Laura Matos RN

## 2021-05-31 NOTE — PROGRESS NOTES
Initial VS  SpO2 99% on RA  RR 18  HR 85  EtCO2 33      EBUS Bronchoscopy done this AM  At 1000 4cc of 4% lido neb given    11cc 4% lido given, above cords  19cc 1% lido given , below cords     EBUS bronchoscope procedure was done successfully without any complication. BAL and biopsy was done from RUL, sample sent. Patient on RA pre-procedure; Patient left the procedure room on RA.     Post-procedure VS  SpO2 93% on RA  HR 92  RR 17  /64

## 2021-05-31 NOTE — PROGRESS NOTES
Patient here ambulatory accompanied by wife for radiation consult for his lung cancer.  Patient states he has been having a little more shortness of breath over the past few weeks but otherwise feels fine.  Only an occasional dry cough, denies swallowing problems.  20 minutes spent in review of radiation process and potential side effects.  Written information given for review: ACS RT book, Mateo RT handouts, RT to chest handouts, HE class schedule, insurance PA handout, team photo sheet and cancer care welcome letter.  Seen by Dr. Denny.  Plan RTC for follow up and/or CT simulation as directed by physician.

## 2021-05-31 NOTE — PRE-PROCEDURE
Procedure Name: EBUS with possible biopsies  Date/Time: 8/14/2019 9:55 AM  Written consent obtained?: Yes  Risks and benefits: Risks, benefits and alternatives were discussed  Consent given by: patient  Expected level of sedation: moderate  ASA Class: Class 2- mild systemic disease, no acute problems, no functional limitations  Mallampati: Grade 1- soft palate, uvula, tonsillar pillars, and posterior pharyngeal wall visible and Grade 2- soft palate, base of uvula, tonsillar pillars, and portion of posterior pharyngeal wall visible  Patient states understanding of procedure being performed: Yes  Patient's understanding of procedure matches consent: Yes  Procedure consent matches procedure scheduled: Yes  Appropriately NPO: yes  Lungs: lungs clear with good breath sounds bilaterally  Heart: normal heart sounds and rate  History & Physical reviewed: History and physical reviewed and no updates needed  Statement of review: I have reviewed the lab findings, diagnostic data, medications, and the plan for sedation

## 2021-05-31 NOTE — H&P
I have performed an assessment and examined the pt, as necessary, to update the patient's current status that may have changed since the piror History and Physical. The History & Physical has been reviewed and updated and the pt's status is unchanged.    I have reviewed the H&P by myself from 8/2/2019, with no changes/updates. Pt to have bronchoscopy/EBUS with biopsies as planned.    Lauri Barboza MD  8/14/2019

## 2021-05-31 NOTE — TELEPHONE ENCOUNTER
Dr. Sorto,    It looks like you last saw this patient 7/23, Please see message below.      Thanks,  Heidi

## 2021-05-31 NOTE — CONSULTS
R/O TB, pt has AFB present on fluoroscopy, from needle aspirate. Request/order from clinic for sputums x3 to be done. Await results. Recommend to take Airborne precautions until sputum results are known. Pt to wear mask when return sputums.

## 2021-05-31 NOTE — PROGRESS NOTES
Art here to  3 cups for three sputum cultures to check for TB, Informed that a specimen came back positive from his bronch procedure and we now need to Do 3 sputum cultures  To rule out active TB. A skin test was done today and a blood draw for AFB. He will come back on Monday to have his skin test read. Number given to call with any questions or concerns. Masks given and asked to wear when he is around people.

## 2021-05-31 NOTE — PROGRESS NOTES
[facility] Radiation Oncology Consult Note    Patient: Jared Moeller  MRN: [id number]  Date of Service: 08/26/2019    Assessment:     1. 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 Hx of COPD, CKD with creat ~2.00 and GFR 33.     1. We discussed risks and benefits, in detail, of radiation therapy including side effects as described below. The patient voices understanding of the information discussed and wishes to proceed forward with treatment. We will obtain CT sim to begin radiation planning, plan on 60Gy with concurrent chemotherapy. A significant portion of esophagus will be in field so instructed on dietary modifications after we get going. All questions and concerns addressed.    2. Final staging pending MR head which is scheduled for 8/28/2019.   3. We will plan him this week and while PA, planning, etc happening he wishes to go back to [place] to  some belongings.   4. Stopped smoking 4 years ago.     Face to face time  60 minutes with > 75% spent on consultation, education and coordination of care.  Intent of Therapy: Curative     Side effects that may occur during or within weeks after Radiation Therapy      Fatigue and general weakness     Darkening, irritation, itchiness, redness, dryness and peeling of the skin of the chest    Loss of chest and armpit hair    Painful swallowing limiting solid and liquid intake and causing dehydration    Nausea, vomiting and decrease  in appetite    COPD flare up requiring short/long term steroids.    Radiation pneumonitis which may also require short/long term steroids    Side effects that may occur months or years after Radiation Therapy       Development of another tumor or cancer    Lung inflammation or fibrosis causing cough, fever, and shortness of breath     Fracture of ribs    Permanent narrowing or obstruction of the esophagus    Fluid compressing the lung (pleural effusion)    Coronary artery blockage causing angina pain or a heart attack    Thickening, telangiectasias (development of spider like blood vessels in the skin) and ulceration of the skin of the chest    Poor healing after a trauma or surgery in the irradiated areas    Nerve damage resulting in loss of strength or sensation    The risks, benefits and alternatives to radiation therapy were outlined with the patient. All questions were answered and a consent was signed.    Subjective:      HPI: Jared Moeller is a 78 y.o. male moderately differentiated invasive squamous cell carcinoma involving and collapsing the RUL, 4.3 x 4.7 x 5.6 cm, with biopsy confirmed mediastinal LN, station R4 LN, and station R10 LN involvement.    The patient originally learned of a pulmonary mass from on x-ray approximately 3-4 years ago while he was living in [place] and seeing pulmonology, at that time he was also diagnosed with COPD. Follow up CT scan was performed and he was told this was benign appearing with further follow up recommended. He was lost to follow up for a few years until more recently a CT scan showed the RUL mass enlarging and very suspicious for lung cancer. He has returned from [place] for further follow up, during his time there he was also diagnosed with CKD with creatinines ranging from 1.5-1.9 over the past year.    CT chest w/o contrast on 7/31/2019 showed an obstructing central right suprahilar mass and complete atelectasis of the RUL, approximately 3 cm in size. Also  noted was an 8mm rounded indeterminate nodule at LLL. PET CT on 8/9/2019 returned with findings suspicious for central RUL cancer, measuring 4.3 x 4.7 x 5.6 cm, with direct mediastinal extension as well as mets involving the thoracic LNs including mediastinum and possible right pericardial region and possible right pleura (FDG avid right lower paratracheal station 4 and subcarinal station 7 suspicious for arik mets). MRI head is currently scheduled for 8/28/2019.      Endobronchial US guided FNA was performed on 8/14/2019, pathology returned positive for metastatic poorly differentiated squamous cell carcinoma in the sampled mediastinal LN, station R4 LN, station R10 LN, and bronchial washing and bronchus take off of the RUL. The subcarinal LN did not return with any tumor. 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     Regarding his COPD, PFT on 7/31/2019 was abnormal. Spirometry is consistent with mild obstructive ventilatory defect. Spirometry is not consistent with reversibility. There is no hyperinflation. There is air-trapping. Diffusion capacity when corrected for hemoglobin is moderately reduced.    The patient presents to discuss radiation therapy. He feels some minor short of breath over the past several weeks but this is tolerable. His COPD has been fairly unnoticeable and he was very active while living in [place]. Otherwise he has no complaints. No history of COPD flares requiring hospitalization or short/long term steroid use. He quit smoking approximately 4 years.    Prior Radiation: No  Concurrent Chemotherapy: TBD    Current Outpatient Medications   Medication Sig Dispense Refill     cilostazol (PLETAL) 100 MG tablet Take 100 mg by mouth 2 (two) times a day.       epinephrine (EPIPEN INJ) Inject as directed as needed.       Lactobacillus rhamnosus GG  (CULTURELLE) 15 billion cell CpSP Take 1 capsule by mouth 2 (two) times a day with meals.       loratadine (CLARITIN) 10 mg tablet Take 10 mg by mouth daily.       telmisartan-hydrochlorothiazide (MICARDIS HCT) 80-12.5 mg per tablet Take 1 tablet by mouth daily.       TIOTROPIUM BROMIDE (SPIRIVA WITH HANDIHALER INHL) Inhale 1 puff daily.       No current facility-administered medications for this visit.      Past Medical History:   Diagnosis Date     BPH (benign prostatic hyperplasia)      Carotid stenosis, bilateral 04/07/2016    left 50-69%. moderate right side.     Chronic kidney disease (CKD), stage III (moderate) (H)      Colon polyps      COPD (chronic obstructive pulmonary disease) (H)      Epididymal cyst, left side. 04/07/2016     Hematuria 12/2002    Work-up was negative.     Hypertension      Inguinal hernia     Right     Nephritis     11 years old.     Peripheral vascular disease (H)      Polio     6 years of age. Right leg. Recovered fully.     Shingles 1996    left forehead.     Past Surgical History:   Procedure Laterality Date     COLONOSCOPY       CYSTOSCOPY  2002     FLEXIBLE BRONCHOSCOPY  08/14/2019    with EBUS FNA>     IR EXTREMITY ANGIOGRAM BILATERAL  04/06/2016     NASAL SEPTUM SURGERY  1980s     TONSILLECTOMY  1948     [employer]  Family History   Problem Relation Age of Onset     Ulcers Mother         did not make it after surgery.     Heart disease Father      Other Father         accidental death.     Pacemaker Sister      Other Brother         Polio.     No Medical Problems Son      No Medical Problems Son      Pneumonia Half Brother 81     No Medical Problems Half Brother      Multiple sclerosis Sister      No Medical Problems Sister      Social History     Socioeconomic History     Marital status:      Spouse name: Not on file     Number of children: Not on file     Years of education: Not on file     Highest education level: Not on file   Occupational History      Occupation: Retired.     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   Social Needs     Financial resource strain: Not on file     Food insecurity:     Worry: Not on file     Inability: Not on file     Transportation needs:     Medical: Not on file     Non-medical: Not on file   Tobacco Use     Smoking status: Former Smoker     Packs/day: 1.00     Years: 50.00     Pack years: 50.00     Last attempt to quit: 2015     Years since quittin.5     Smokeless tobacco: Never Used     Tobacco comment: used E-cigarettes for about a month only.    Substance and Sexual Activity     Alcohol use: Yes     Alcohol/week: 1.8 oz     Types: 3 Standard drinks or equivalent per week     Comment: no more than 2 daily but not every day     Drug use: No     Sexual activity: Not on file   Lifestyle     Physical activity:     Days per week: Not on file     Minutes per session: Not on file     Stress: Not on file   Relationships     Social connections:     Talks on phone: Not on file     Gets together: Not on file     Attends [employer] service: Not on file     Active member of club or organization: Not on file     Attends meetings of clubs or organizations: Not on file     Relationship status: Not on file     Intimate partner violence:     Fear of current or ex partner: Not on file     Emotionally abused: Not on file     Physically abused: Not on file     Forced sexual activity: Not on file   Other Topics Concern     Not on file   Social History Narrative     Not on file        Review of Systems:        General  Constitutional (WDL): Exceptions to WDL  Fatigue: Fatigue relieved by rest  EENT  Eye Disorder (WDL): All eye disorder elements are within defined limits(wears reading glasses)  Ear Disorder (WDL): Exceptions to WDL  Tinnitus: Mild symptoms, intervention not indicated(bilateral)  Respiratory       Respiratory (WDL): Exceptions to WDL  Cough: Mild symptoms, nonprescription intervention indicated(dry)  Dyspnea: Shortness of breath with  moderate exertion  Cardiovascular  Cardiovascular (WDL): All cardiovascular elements are within defined limits  Endocrine     Gastrointestinal  Gastrointestinal (WDL): All gastrointestinal elements are within defined limits  Musculoskeletal  Musculoskeletal and Connetive Tissue Disorders (WDL): Exceptions to WDL  Arthralgia: Mild pain  Bone Pain: Mild pain(neck, shoulder blades)  Integumentary               Integumentary (WDL): All integumentary elements are within defined limits  Neurological  Neurosensory (WDL): All neurosensory elements are within defined limits  Peripheral Sensory Neuropathy: None(occasional numbness to Lt toe from bunions)  Psychological/Emotional   Patient Coping: Accepting  Hematological/Lymphatic  Lymph (WDL): All lymph disorder elements are within defined limits  Dermatologic     Genitourinary/Reproductive  Genitourinary (WDL): Exceptions to WDL(nocturia x2-3)  Reproductive     Pain              Currently in Pain: Yes  Pain Score (Initial OR Reassessment): 4  Pain Frequency: Intermittent  Location: neck, between shoulder blades  Pain Intervention(s): Repositioned;Home medication;Heat applied  Response to Interventions: some   AUA Assessment                    Accompanied by  Accompanied by: Family Member      Objective:     Physical Exam    Vitals:    08/26/19 1303   BP: 118/58   Pulse: (!) 111   Temp: 98.4  F (36.9  C)   TempSrc: Oral   SpO2: 99%   Weight: 121 lb 4.8 oz (55 kg)       GENERAL: no acute distress. Cooperative in conversation.   HEENT: pupils are equal, round and reactive. [place]uc[place] moist.  RESP: Lung sounds absent in right upper lung field, otherwise lung sounds present throughout. Regular respiratory rate. No wheezes or rhonchi.  CV: Regular, rate and rhythm.   ABD: soft, nontender..   MUSCULOSKELETAL: no palpable points of tenderness   NEURO: non focal. Alert and oriented x3.   PSYCH: within normal limits. No depression or anxiety.  SKIN: warm dry intact   LYMPH: no  cervical, supraclavicular lymphadenopathy  EXTREMITIES: no edema      Recent Labs:   Recent Results (from the past 168 hour(s))   Comprehensive Metabolic Panel   Result Value Ref Range    Sodium 138 136 - 145 mmol/L    Potassium 4.2 3.5 - 5.0 mmol/L    Chloride 107 98 - 107 mmol/L    CO2 24 22 - 31 mmol/L    Anion Gap, Calculation 7 5 - 18 mmol/L    Glucose 141 (H) 70 - 125 mg/dL    BUN 34 (H) 8 - 28 mg/dL    Creatinine 1.97 (H) 0.70 - 1.30 mg/dL    GFR MDRD Af Amer 40 (L) >60 mL/min/1.73m2    GFR MDRD Non Af Amer 33 (L) >60 mL/min/1.73m2    Bilirubin, Total 0.3 0.0 - 1.0 mg/dL    Calcium 9.9 8.5 - 10.5 mg/dL    Protein, Total 7.1 6.0 - 8.0 g/dL    Albumin 3.3 (L) 3.5 - 5.0 g/dL    Alkaline Phosphatase 79 45 - 120 U/L    AST 11 0 - 40 U/L    ALT <9 0 - 45 U/L   Lactate Dehydrogenase (LDH)   Result Value Ref Range    LD (LDH) 132 125 - 220 U/L   HM1 (CBC with Diff)   Result Value Ref Range    WBC 6.7 4.0 - 11.0 thou/uL    RBC 3.70 (L) 4.40 - 6.20 mill/uL    Hemoglobin 10.9 (L) 14.0 - 18.0 g/dL    Hematocrit 33.9 (L) 40.0 - 54.0 %    MCV 92 80 - 100 fL    MCH 29.5 27.0 - 34.0 pg    MCHC 32.2 32.0 - 36.0 g/dL    RDW 15.2 (H) 11.0 - 14.5 %    Platelets 276 140 - 440 thou/uL    MPV 10.7 8.5 - 12.5 fL    Neutrophils % 71 (H) 50 - 70 %    Lymphocytes % 15 (L) 20 - 40 %    Monocytes % 8 2 - 10 %    Eosinophils % 5 0 - 6 %    Basophils % 1 0 - 2 %    Neutrophils Absolute 4.8 2.0 - 7.7 thou/uL    Lymphocytes Absolute 1.0 0.8 - 4.4 thou/uL    Monocytes Absolute 0.5 0.0 - 0.9 thou/uL    Eosinophils Absolute 0.3 0.0 - 0.4 thou/uL    Basophils Absolute 0.1 0.0 - 0.2 thou/uL       Imaging: Imaging results 30 days: Ct Chest Without Contrast    Result Date: 7/31/2019  EXAM: CT CHEST WO CONTRAST LOCATION: [facility] DATE/TIME: 7/31/2019 9:42 AM INDICATION: Cough lung mass in [place] identified on CT scan of chest COMPARISON: None. TECHNIQUE: Helical images were obtained through the chest. Multiplanar reformats were obtained.  Dose reduction techniques were used. CONTRAST: None. Renal insufficiency. FINDINGS: LUNGS AND PLEURA: There appears to be a central right suprahilar mass with complete atelectasis of right upper lobe. Without contrast it is very difficult to discern central malignancy from the surrounding atelectatic lung but likely the central mass is 3 cm in size. The right upper lobe bronchus appears occluded near its origin. An 8 mm rounded nodule is present within left lower lobe on image 113. Severe emphysema present. MEDIASTINUM: There are a few small scattered mediastinal lymph nodes none are abnormally large. Heart size normal. Atherosclerotic calcifications of aorta and coronary arteries. LIMITED UPPER ABDOMEN: Negative. MUSCULOSKELETAL: No suspicious bony lesions.     CONCLUSION: 1.  Findings consistent with obstructing central right suprahilar mass and complete atelectasis right upper lobe. This lesion would be best approached bronchoscopically. 2.  8mm rounded indeterminate nodule at left lung base. 3.  Consider PET CT evaluation for staging. 4.  Severe emphysema.     Nm Pet Ct Skull To Mid Thigh    Result Date: 8/9/2019  EXAM: NM PET CT SKULL TO MID THIGH LOCATION: [facility] DATE/TIME: 8/9/2019 9:35 AM INDICATION: Pulmonary nodule, right upper lobe. Central right upper lobe mass. Initial treatment strategy. COMPARISON: CT chest 07/31/2019 reviewed. TECHNIQUE: Serum glucose level 101 mg/dL. One hour post intravenous administration of 8.6 mCi F-18 FDG, PET imaging was performed from the skull base to the mid thighs utilizing attenuation correction with concurrent axial CT and PET/CT image fusion. Dose reduction techniques were used. FINDINGS: Mass in the central right upper lobe measuring approximately 4.3 x 4.7 x 5.6 cm demonstrates marked uptake (SUVmax 14.9), highly suspicious for malignancy. Mass involves the right suprahilar region and right lower paratracheal mediastinum by direct extension. Associated  complete right upper lobe atelectasis. Separate FDG avid right lower paratracheal station 4 and subcarinal station 7 (SUVmax 7.2), adenopathy suspicious for arik metastases. Few prominent right anterior cardiophrenic nodes with low-level uptake are indeterminate but suspicious for additional early arik metastases. Mild areas of subpleural thickening in the posterior right chest associated with low-level but appreciable uptake, suspicious for early pleural/subpleural metastases. No pleural effusion. No suspicious focal uptake in the liver skeleton. Normal adrenal glands. Severe emphysema. Moderate atherosclerotic calcifications including the coronary arteries. 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 Marked prostate gland enlargement. Large left scrotal hydrocele. Moderate scattered changes in the spine.     CONCLUSION: Findings suspicious for central right upper lobe lung cancer with direct mediastinal extension as well as metastases involving thoracic lymph nodes including mediastinum and possibly right pericardial region, and possibly right pleura.       Pathology:   Results for orders placed or performed during the hospital encounter of 08/14/19 (from the past 8760 hour(s))   Surgical pathology exam   Result Value    Case Report      Surgical Pathology Report                         Case: [id number]                                    Authorizing Provider:  Lauri Barboza MD        Collected:           08/14/2019 1204              Ordering Location:           Received:            08/14/2019 1425                                     Respiratory                                                                  Pathologist:           Mark Anthony Linton MD                                                      Specimen:    Endobronchial, RUL                                                                         Final Diagnosis       "ENDOBRONCHIAL BIOPSIES, UPPER LOBE OF RIGHT LUNG:    -  INVASIVE SQUAMOUS CELL CARCINOMA, MODERATELY DIFFERENTIATED AND NON-KERATINIZING,     -  SQUAMOUS CELL CARCINOMA IN-SITU ALSO PRESENT    [facility]    Comment      The case is also reviewed by Dr Ambar Lal, who concurs.    Microscopic Description      Microscopic examination performed, substantiating the above diagnosis.    Clinical Information      Clinical history: Pt with right upper lobe lung mass  Reason for procedure:  Diagnostic    Gross Description      The specimen is received in formalin, labeled with the patient's name and designated \"endobronchial biopsies from take-off of RUL,\" and consists of four nubbins of firm pink-tan tissue, each 1-2 mm in diameter. The tissue is totally submitted. JPL:jmb    Charges CPT:  26739  ICD-10: C34.90  PQRS:      Result Flag Malignant (!)     Comment:   SPECIMEN PROCESSING:    All histology slide preparation and stains; and cytology slide preparation, staining, and cytotechnologist screening done at [facility] are performed at [facility], [address] [place] [address], [id number], with final interpretation, frozen section analysis, and cytology adequacy assessment at indicated laboratory.             I, Loren Denny MD personally performed the services described in this documentation, as scribed by Kash Carreon in my presence, and it is both accurate and complete.    Signed by: Loren Denny MD, MPH     "

## 2021-05-31 NOTE — TELEPHONE ENCOUNTER
Called patient to inform them that EBUS is scheduled for Wed. Aug 14 th  at 10:00 at [facility].  Instructed to arrive at 08:30.  An IV will be placed and IV sedation will be given.  Biopsies may be done.  Instructed to have nothing to eat or drink for 6 hours before procedure.  Medication instructions: Take BP medication with a sip of water  Stop Aspirin date:  N/A  Stop Ibuprofen, NSAIDS, coxibs (ie:celebrex):  N/A  Stop blood thinner date: 8/9/19 Cilostazol  Patient understands that they will need a ride home after the procedure and someone to stay with them at home after the procedure.  Patient had no further questions and is agreeable to procedure.  Phone number given for questions.

## 2021-05-31 NOTE — PROGRESS NOTES
RESPIRATORY CARE NOTE     Patient Name: Jared Moeller  Today's Date: 7/31/2019     Complete PFT done. Pt performed tests with good effort. Test results meet ATS criteria except for the lung volume measurements due to invalid frequency error codes. Results scanned into epic. Pt left in no distress.       Monica Carballo RRT

## 2021-05-31 NOTE — TELEPHONE ENCOUNTER
Ran out of BP meds.  MIcardis, spiriva and pletal.    Previously prescribed by other provider.    RN cannot approve Refill Request    RN can NOT refill this medication historical medication requested. Last office visit: Visit date not found Last Physical: Visit date not found Last MTM visit: Visit date not found Last visit same specialty: Visit date not found.  Next visit within 3 mo: Visit date not found  Next physical within 3 mo: Visit date not found      Fern Whaley, [facility] Triage/Med Refill 9/3/2019    There are no medications in this encounter.      Fern Whaley RN, [facility] Nurse Triage/Med Refills RN

## 2021-05-31 NOTE — PROGRESS NOTES
Patient update    D/w Dr Solorio re bronch/ebus results. ABF stain was on done on R4 LN and came back positive (done on fluorescent microscope).  Dr Solorio will be doing more stains. I have added a AFB cultures on bronchial wash that we did for procedure. I have also added sputum AFB x 3 to be collected by clinic. I informed infection control at the hospital.  Lauri Barboza MD  8/15/2019

## 2021-05-31 NOTE — PROGRESS NOTES
Art was here to have his TB skin test read, it is negative. Dropped off his 3 sputum cultures and they were taken to the lab. Call placed to Afsaneh in infection control to update her as well.

## 2021-05-31 NOTE — PROGRESS NOTES
"PULMONARY CLINIC FOLLOW UP NOTE    History:     HPI: Jared Moeller is a 78 y.o. male who is here for follow up.  Patient lives in [place] in the winter and in [place] in the summer.  He was found to have \"spot\" in his lung about 3 to 4 years ago and was followed closely back in [place].  He noted that he had a CT scan of the chest 6 months ago that showed no change.  However he had started developing weight loss, approximate 15 pounds over the past 6 months.  He also had a cough that was nonproductive and denies any hemoptysis.  This led to a CT scan of the chest that was done in [place] and showed right upper lobe lung mass.  He then came to [place] for further evaluation.    Interval History: Patient is here for his scheduled follow-up.  He had a CT scan of the chest here.  He denies any changes in his symptoms.  He denies any hemoptysis.  He had his PFTs as well.  He notes that sometimes he has neck aches but denies any headaches.    PMHx/PSHx:  COPD  Seasonal allergies  PVD  HTN  CKD  H/o polio  H/o inguinal hernia      Social Hx:    Lives in [place] in the winter and [place] in the summer  Used to smoke 1 pack per day and quit 4 years ago    ROS: 10 point review of system done. Pertinent findings are noted in the HPI.    Exam/Data:   /58   Pulse (!) 108   Resp 28   Wt 119 lb 11.2 oz (54.3 kg)   SpO2 95% Comment: RA  BMI 19.32 kg/m  , Body mass index is 19.32 kg/m .  GEN: comfortable, NAD  HEENT: NCAT, EMOI, mmm  LN: no cervical LAD   CVS: S1S2, RRR  Lung: good air entry bilaterally  Abd: soft, nt, + BS. No masses  Ext: no c/c/e  Vasc: intact radial pulses bilaterally  Neuro: nonfocal  Skin: no visible rash  Musculoskeletal: FROM all extremities  Psych: normal affect    Data:   Labs and Imaging personally reviewed.  Pertinent findings include:      Ct Chest Without Contrast    Result Date: 7/31/2019  EXAM: CT CHEST WO CONTRAST LOCATION: [facility] DATE/TIME: 7/31/2019 9:42 AM INDICATION: " Cough lung mass in [place] identified on CT scan of chest COMPARISON: None. TECHNIQUE: Helical images were obtained through the chest. Multiplanar reformats were obtained. Dose reduction techniques were used. CONTRAST: None. Renal insufficiency. FINDINGS: LUNGS AND PLEURA: There appears to be a central right suprahilar mass with complete atelectasis of right upper lobe. Without contrast it is very difficult to discern central malignancy from the surrounding atelectatic lung but likely the central mass is 3 cm in size. The right upper lobe bronchus appears occluded near its origin. An 8 mm rounded nodule is present within left lower lobe on image 113. Severe emphysema present. MEDIASTINUM: There are a few small scattered mediastinal lymph nodes none are abnormally large. Heart size normal. Atherosclerotic calcifications of aorta and coronary arteries. LIMITED UPPER ABDOMEN: Negative. MUSCULOSKELETAL: No suspicious bony lesions.     CONCLUSION: 1.  Findings consistent with obstructing central right suprahilar mass and complete atelectasis right upper lobe. This lesion would be best approached bronchoscopically. 2.  8mm rounded indeterminate nodule at left lung base. 3.  Consider PET CT evaluation for staging. 4.  Severe emphysema.       FEV1/FVC is 53 and is reduced.  FEV1 is 74% predicted and is reduced.  FVC is 105% predicted and is normal.  There was no improvement in spirometry after a single inhaled dose of bronchodilator.  TLC is 115% predicted and is normal.  RV is 132% predicted and is increased.  DLCO is 49% predicted and is reduced when it   is corrected for hemoglobin.  The flow volume loop is normal shows severe scooping     Impression:  Full Pulmonary Function Test is abnormal. Spirometry is consistent with mild obstructive ventilatory defect.  Spirometry is not consistent with reversibility.  There is no hyperinflation.  There is air-trapping.  Diffusion capacity when corrected for hemoglobin is  "moderately reduced.      Assessment/Plan:     Jared Moeller is a 78 y.o. male, former smoker, with history of lung mass that has been stable over the past several years until most recently showing marked growth.    Had been followed in [place].  We only have one CT scan of the chest that we were just able to upload it.  He had a CT scan of the chest done here in [place], few days ago.  This confirmed the right upper lobe lung mass which is obstructing his right upper lobe.  There is also e/o severe emphysema.  Unfortunately, we don't have his previous CT chest imaging when he was told he had a \"spot\" 3-4 years ago. The only CT chest that we were finally able to upload is the most recent that he had in [place] showing a large suprahilar mass with complete atelectasis of the RUL.  PFT's as noted above with mild to moderate picture.    Recommendations:  PET scan before we go ahead and proceed with biopsies  Will call pt as soon as we have PET scan result in order to better plan       Lauri Barboza MD  Pulmonary and Critical Care Medicine  Electronically Signed on 8/2/2019    Current Outpatient Medications   Medication Sig Dispense Refill     cilostazol (PLETAL) 100 MG tablet Take 100 mg by mouth 2 (two) times a day.       epinephrine (EPIPEN INJ) Inject as directed as needed.       loratadine (CLARITIN) 10 mg tablet Take 10 mg by mouth daily.       telmisartan-hydrochlorothiazide (MICARDIS HCT) 80-12.5 mg per tablet Take 1 tablet by mouth daily.       TIOTROPIUM BROMIDE (SPIRIVA WITH HANDIHALER INHL) Inhale 1 puff daily.       No current facility-administered medications for this visit.      Allergies   Allergen Reactions     Venom-Honey Bee        Meds and Allergies: See EHR for the updated medication list and Allergies. These were reviewed.     Much or all of the text in this note was generated through the use of the Dragon Dictate voice-to-text software. Errors in spelling or words which seem out of context are " unintentional. Sound alike errors, in particular, may have escaped editing.

## 2021-05-31 NOTE — SEDATION DOCUMENTATION
Patient tolerated procedure well. Alert and oriented. VSS. Maintaining O2 on RA. Denying pain. Transferred to [facility] via wheelchair. Bedside report given to PRANEETH Sánchez.

## 2021-05-31 NOTE — PROGRESS NOTES
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 Pt called with results.  Biopsy of LN is pending. Pt will also be sending 3 sputum samples for AFB.  Referral has been made to oncology.    Lauri Barboza

## 2021-05-31 NOTE — TELEPHONE ENCOUNTER
I left a message with the wife Pat to call back to set up an EBUS they will need to talk to the nurse after to get the prep as well.

## 2021-05-31 NOTE — CONSULTS
[facility] Hematology and Oncology Consult Note    Patient: Jared Moeller  MRN: [id number]  Date of Service: 08/20/2019        Reason for Visit    I was consulted by Dr. 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    Assessment/Plan    Mr. Jared Moeller is a 78 y.o. who has a COPD and a history of smoking in the past which he quit in 2015.  He did have some lung nodules in the past but they appear not to have progressed.  He was found last month to have a right upper lobe lung mass.  This is in the right suprahilar area contiguous with the mediastinum.  The mass in the PET CT scan was 5.6 x 4.7 x 4.3 cm in size and PET positive.  This involved the right suprahilar region and right lower paratracheal mediastinum with direct extension.  There is complete collapse of the right upper lobe.  There is involvement of the mediastinum, possibly the right pericardial region and possibly right pleura in the right lower lobe region.  The FNA done through endobronchial ultrasound on 8/14/2019 this is found to be moderately differentiated squamous cell cancer of the lung.  The mediastinal lymph nodes are also positive.  In addition the oral main rhodamine stain for acid-fast bacilli was positive in the aspirate from the lower paratracheal (4R) lymph node.  However the Kinyoun stain for AFB was negative.    The patient has a very good performance status.  He is a small built but muscular and active.  However he has a significant comorbidities including peripheral vascular disease, hypertension, COPD and chronic kidney disease.    1. I have gone through his past medical records in detail.  I have also obtained and reviewed his past medical records from [facility].  I have reviewed his labs and I have personally reviewed the images of the CT scan of the chest and the PET CT scan.    2.  I reviewed the images of the PET CT scan and the report with the patient and family.  I explained to  him that the cancer appears to originate from the right upper lobe lung mass.  That is causing complete collapse of the right upper lobe.  It is involving the mediastinal lymph nodes.  There is a solution that it may also be involving the visceral pleura in the right lower lobe region.  This is at least stage IIIb disease.  In the PET CT scan, we are not seeing distant metastasis.  However if you do find a distant metastasis then this will be stage IV disease.  I discussed with him that I would recommend doing a MRI scan of the brain.  I want to rule out the possibility of brain mets.  He was agreeable.    3.  I discussed with the patient and family that if he has stage IIIb disease, the recommendation is to treat with chemotherapy and radiation.  For that the question is whether he would be a candidate for a radiation given the extent of disease.  He has had a full PFTs done on 7/31/2019.  There is mild obstruction present.  However the lung function does not appear terrible.  I discussed with him that we will need the help of a radiation oncologist to assess and see whether radiation for the lung cancer is feasible.  I am referring him to radiation oncology.    4.  I discussed with him that if radiation is possible that along with that I would recommend chemotherapy.  With a combination of chemotherapy and radiation there is a small chance of cure.  The chance is not very high at all.  I think the chances only about 15% in 5 years time.  However nowadays we are adding immunotherapy to chemotherapy and radiation.  With that approach we are getting even better results.  This approach is new so we do not yet know how good the cure rates are.  If we find that there is no metastatic disease, then this is approach would be unlikely to take.    5.  I discussed with him that when we are going for chemotherapy for the lung cancer one major concern for me is the chronic kidney disease.  Creatinine has been running at the  1.9 range after coming to [place].  I think there is something significant going on in his kidneys.  I think the history of having nephritis when he was 11 years old and then again having hematuria in 2002 for which no specific cause was found pointed to a primary kidney disease going on.  I discussed with him that ideally when he receives radiation he should receive a platinum-containing chemotherapy.  That can have the effect of worsening his kidney function.  Thus I need him to see a nephrologist and have an appropriate work-up very soon before we get started with chemotherapy.  He has already been referred to the [facility] [place].  I was able to talk to Dr. Field of case him.  She is currently agreed to help us try to expedite the appointment.    6.  Regarding the auramine rhodamine stain for AFB coming back positive in the right paratracheal lymph node, my suspicion that he has active tuberculosis is very low.  The Mantoux test did not show any reaction.  The QuantiFERON test also came back negative.  It is also significant that the Kinyoun stain is negative on the same lymph node.  The 3 sputum samples do not show positive AFB on staining. We will need to wait and see what the cultures show.     7.  We have obtained the following labs for baseline: CBC differential platelets, CMP and LDH level.    8.  Follow-up: Return to clinic with me after having the MRI brain, radiation oncology consultation and the nephrology consultation.        ECOG Performance   ECOG Performance Status: 1  Distress Assessment  Distress Assessment Score: 3        Problem List    1. Primary cancer of right upper lobe of lung (H)  HM1(CBC and Differential)    Comprehensive Metabolic Panel    Lactate Dehydrogenase (LDH)    HM1 (CBC with Diff)    Ambulatory referral to Radiation Oncology    MR Brain With Without Contrast   2. Chronic kidney disease (CKD), stage III (moderate) (H)             CC: Kaylene Sorto,  MD      ______________________________________________________________________________      Staging History    Cancer Staging  Primary cancer of right upper lobe of lung (H)  Staging form: Lung, AJCC 8th Edition  - Clinical stage from 8/20/2019: Stage IIIB (cT4, cN2, cM0) - Unsigned      History    Mr. Jared Moeller is a 78-year-old gentleman who is here for the consultation with his wife and their friend Ms. Emilee Bush.    He says that he and his wife have been living in [place] for the last 5 years after residential.  3 years ago he saw a pulmonary specialist there.  He was diagnosed to have COPD and apparently in a chest x-ray there is a spot in the lungs.  This is followed by a CT scan of the chest.  This is felt to be a benign-appearing lung nodule and he was advised to follow-up.  He says that he did not have any further doctor visits for the next 3 years but recently he and his wife decided to catch up on their healthcare appointments.  He had a CT scan of the chest done in [place] which showed a right upper lobe lung mass which was very suspicious for lung cancer.  He was advised to [place] for a work[place]up.  He is now living in an apartment rented by his son.  While he was in [place] he was also found to have chronic kidney disease.  Apparently creatinine has been in the 1.5-1.9 range over the last year.    After coming back to the [place] he saw Dr. Barboza of pulmonary and had a CT scan of the chest done on 7/31/2019, a PET CT scan on 8/9/2019 and then a bronchoscopy with endobronchial ultrasound FNA done on 8/14/2019.  In the bronchoscopy he was found to have a completely occluded right upper lobe airway due to extrinsic compression.  Biopsies and washings were consistent with squamous cell cancer of the lung.    He says that he is very physically active.  In spite of the COPD he walks a lot.  [place] that regularly daily.  Usually  this only by the time he reached to stop that he feels short of breath.  After that he is able to climb up 4 stories of stairs to reach the department since the elevator is not working.  He does admit that if he exerts himself beyond that then he will feel short of breath.  He has only an occasional cough.  He activated the cough to the sinus drainage earlier.  After the bronchoscopy he is bringing up some scanty clear phlegm.    He says that he really does not have much in the way of pain.  He did have some chronic neck pains over the last year but they are not very active.  He is not losing weight.    He has a tendency to wake up to 3 times at night to pass urine.  He attributes that to BPH.    He gives accurate his history of having to be admitted to the hospital with nephritis when he was a child.  Apparently things resolved after that.  However in 2002 he was noted to have hematuria and had a urological work-up for that and nothing definite was found.  He did not see a nephrologist at that time.  He has already been referred to nephrology by Dr. Sorto but the appointment is more than a month out from now.    No fevers.  No night sweats.  No lumps or bumps anywhere.  No unusual headaches.  No eyesight problems.  No mouth sores.  No swallowing difficulty.  No nausea or vomiting.  No abdominal pain.  No constipation and no diarrhea.  No blood in stool or black stools.  No skin rashes.  No numbness or tingling.  No chest pain or palpitation.  No bleeding from any site.    Please see below.  A 14 point review of system is otherwise completely negative.    Past History  Past Medical History:   Diagnosis Date     BPH (benign prostatic hyperplasia)      Carotid stenosis, bilateral 04/07/2016    left 50-69%. moderate right side.     Chronic kidney disease (CKD), stage III (moderate) (H)      Colon polyps      COPD (chronic obstructive pulmonary disease) (H)      Epididymal cyst, left side. 04/07/2016     Hematuria  2002    Work-up was negative.     Hypertension      Inguinal hernia     Right     Nephritis     11 years old.     Peripheral vascular disease (H)      Polio     6 years of age. Right leg. Recovered fully.     Shingles 1996    left forehead.     Past Surgical History:   Procedure Laterality Date     COLONOSCOPY       CYSTOSCOPY       FLEXIBLE BRONCHOSCOPY  2019    with EBUS FNA>     IR EXTREMITY ANGIOGRAM BILATERAL  2016     NASAL SEPTUM SURGERY  1980s     TONSILLECTOMY  1948     Family History   Problem Relation Age of Onset     Ulcers Mother         did not make it after surgery.     Heart disease Father      Other Father         accidental death.     Pacemaker Sister      Other Brother         Polio.     No Medical Problems Son      No Medical Problems Son      Pneumonia Half Brother 81     No Medical Problems Half Brother      Multiple sclerosis Sister      No Medical Problems Sister      Social History     Socioeconomic History     Marital status:      Spouse name: None     Number of children: None     Years of education: None     Highest education level: None   Occupational History     Occupation: Retired.     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   Social Needs     Financial resource strain: None     Food insecurity:     Worry: None     Inability: None     Transportation needs:     Medical: None     Non-medical: None   Tobacco Use     Smoking status: Former Smoker     Packs/day: 1.00     Years: 50.00     Pack years: 50.00     Last attempt to quit: 2015     Years since quittin.5     Smokeless tobacco: Never Used     Tobacco comment: used E-cigarettes for about a month only.    Substance and Sexual Activity     Alcohol use: Yes     Alcohol/week: 1.8 oz     Types: 3 Standard drinks or equivalent per week     Comment: no more than 2 daily but not every day     Drug use: No     Sexual activity: None   Lifestyle     Physical activity:     Days per week: None     Minutes per session:  None     Stress: None   Relationships     Social connections:     Talks on phone: None     Gets together: None     Attends Muslim service: None     Active member of club or organization: None     Attends meetings of clubs or organizations: None     Relationship status: None     Intimate partner violence:     Fear of current or ex partner: None     Emotionally abused: None     Physically abused: None     Forced sexual activity: None   Other Topics Concern     None   Social History Narrative     None     Allergies    Allergies   Allergen Reactions     Venom-Honey Bee Anaphylaxis       Review of Systems    General  General (WDL): Exceptions to WDL  Fatigue: None  Fever: None  Generalized Muscle Weakness: None  Weight Loss: No  ENT  ENT (WDL): Exceptions to WDL  Changes in vision: None  Glasses or Contacts: Yes - Chronic (Greater than 3 months)(readers)  Hearing loss: Yes - Chronic (Greater than 3 months)  Hearing Aids: Yes - Chronic (Greater than 3 months)  Tinnitus: None  Pain/Pressure in ears: None  Sinus Congestion/Drainage: None  Hoarseness: None  Sore Throat: None  Dental Problems: None  Dentures: Yes - Chronic (Greater than 3 months)  Respiratory  Respiratory (WDL): Exceptions to WDL  Dyspnea: Yes - Chronic (Greater than 3 months)  hemoptysis: None  Is patient on O2?: None  Cough: None  Cardiovascular  Cardiovascular (WDL): All cardiovascular elements are within defined limits  Endocrine  Endocrine (WDL): All endocrine elements are within defined limits  Gastrointestinal  Gastrointestinal (WDL): All gastrointestinal elements are within defined limits  Musculoskeletal  Musculoskeletal (WDL): Exceptions to WDL  Range of Motion Limitation: None  Joint pain: None  Back Pain: Yes - Recent (Less than 3 months)  Activity Assistance: None  Difficulty to lie flat for more than 30 minutes: None  Pain interfering with walking: None  Muscle pain or stiffness: None  Recent fall: None  Assistive device:  "None  Neurological  Neurological (WDL): All neurological elements are within defined limits  Dominant Hand: Right  Psychological/Emotional  Psychological/Emotional (WDL): All psychological/emotional elements are within defined limits  Hematological/Lymphatic  Hematological/Lymphatic (WDL): All hematological/lymphatic elements are within defined limits  Dermatological  Dermatologic (WDL): All dermatological elements are within defined limits  Genitourinary/Reproductive  Genitourinary/Reproductive (WDL): Exceptions to WDL  Urinary Frequency: None  Urinary Incontinence: None  Painful urination: None  Urination more than 2 times a night: Yes - Chronic (Greater than 3 months)  Urinary Urgency: None  Difficulty Initiating Urine Stream: None  Blood in urine: None  Sensation of incomplete emptying of bladder: None  Reproductive (Females only)     Pain  Currently in Pain: Yes  Pain Score (Initial OR Reassessment): 1  Pain Frequency: Constant/continuous  Location: neck, shoulder blades  Pain Characteristics : Aching  Pain Intervention(s): Repositioned    Physical Exam    Recent Vitals 8/20/2019   Height 5' 4\"   Weight 121 lbs 3 oz   BSA (m2) 1.58 m2   BP 91/52   Pulse 117   Temp 97.9   Temp src 1   SpO2 96   Some recent data might be hidden       GENERAL: Alert and oriented to time place and person. Seated comfortably. In no distress.  Small build but muscular.  Very pleasant.    HEAD: Atraumatic and normocephalic.    EYES: DANYA, EOMI.  No pallor.  No icterus.    Oral cavity: no mucosal lesion or tonsillar enlargement.    NECK: supple. JVP normal.  No thyroid enlargement.    LYMPH NODES: No palpable, cervical, axillary or inguinal lymphadenopathy.    CHEST: clear to auscultation bilaterally.  Resonant to percussion throughout bilaterally.  Breath movements appear slightly better on the left side compared to the right side.    CVS: S1 and S2 are heard. Regular rate and rhythm.  No murmur or gallop or rub heard.  No peripheral " edema.    ABDOMEN: Soft. Not tender. Not distended.  No palpable hepatomegaly or splenomegaly.  No other mass palpable.  Bowel sounds heard.    EXTREMITIES: Warm.    SKIN: no rash, or bruising or purpura.  Has a full head of hair.      Lab Results    Recent Results (from the past 168 hour(s))   Medical Cytology   Result Value Ref Range    Case Report       Medical Cytology                                  Case: [id number]                                   Authorizing Provider:  Lauri Barboza MD        Collected:           08/14/2019 1204              Ordering Location[facility]     [facility]      Received:            08/14/2019 1214                                     Respiratory                                                                  Pathologist:           Damaso Solorio MD                                                        Specimens:   A) - Endobronchial, subcarinal                                                                      B) - Endobronchial, mediastinal                                                                     C) - Endobronchial, 4 passes from R4 station                                                        D) - Endobronchial, 4 passes from R10 station                                                       E) - Bronchial Washing, right upper lobe bronchial wash                                              F) - Bronchial Brush, Brush from take off of right upper lobe                              Final Diagnosis       A) SUBCARINAL LYMPH NODE, ENDOBRONCHIAL ULTRASOUND-GUIDED FINE NEEDLE ASPIRATION:        - BLOOD, LEUKOCYTES, FEW LYMPHOCYTES, BENIGN BRONCHIAL EPITHELIAL CELLS,           AND ALVEOLAR MACROPHAGES        - FRAGMENTS OF FIBROCARTILAGINOUS TISSUE        - NO GRANULOMATA ARE SEEN        - NO TUMOR IS SEEN    B) MEDIASTINAL LYMPH NODE, ENDOSCOPIC ULTRASOUND-GUIDED FINE NEEDLE ASPIRATION:        - METASTATIC POORLY DIFFERENTIATED SQUAMOUS CELL CARCINOMA     C)  LYMPH NODE, STATION R4, ENDOSCOPIC ULTRASOUND-GUIDED FINE NEEDLE ASPIRATION:        - METASTATIC POORLY DIFFERENTIATED SQUAMOUS CELL CARCINOMA     D) LYMPH NODE, STATION R10, ENDOSCOPIC ULTRASOUND-GUIDED FINE NEEDLE ASPIRATION:        - METASTATIC POORLY DIFFERENTIATED SQUAMOUS CELL CARCINOMA     E) LUNG, RIGHT UPPER LOBE, BRONCHIAL WASHING, CYTOLOGY AND CELL BLOCK:        - POORLY DIFERENTIATED NON-SMALL CELL CARCINOMA, CONSISTENT WITH SQUAMOUS           CELL CARCINOMA     F) LUNG, RIGHT UPPER LOBE (BRONCHUS, TAKE-OFF), BRUSHING,  CYTOLOGY AND CELL BLOCK:        - RARE CYTOLOGICALLY MALIGNANT CELLS PRESENT, CONSISTENT WITH SQUAMOUS           CELL CARCINOMA     [facility]    Comment       An auramine-rhodamine stain for acid-fast bacilli performed on specimen C (R4 lymph node) is positive. A Kinyoun stain for acid-fast bacilli, however, is negative. A GMS stain for fungi is negative. The results have been discussed with Dr. Lauri Barboza and with infection control specialist, Afsaneh Valencia. Recommend follow-up as clinically appropriate.    Microscopic Description       A) Pap and Diff-Quik stained smears prepared from aspirates of mediastinal lymph node (subcarinal) show scattered neutrophils as well as occasional benign bronchial epithelial cells. Histiocytes containing anthracotic pigment are present. A few lymphocytes are observed, compatible with a hemodilute sample. No granulomata are seen. No tumor is seen.    H&E stained cell block sections show similar cellular elements and fibrocartilaginous tissue.    B) Pap and Diff-Quik stained smears prepared from aspirates of mediastinal lymph node show mainly blood and rare clusters of atypical epithelial cells with increased nuclear-cytoplastic ratios. The background is bloody and only a few lymphocytes are observed in the background. H&E stained cell block sections show mainly blood, rare lymphocytes, and several clusters of atypical epithelial cells with features  described above. These groups of cells have glandular differentiation.     In order to characterize the atypical cells,  immunohistochemical stains are performed. The results are as follows:        Napsin-A: Negative     TTF-1: Negative      Cytokeratin-7: Negative     Cytokeratin-20: Negative     p63: Positive     C) Pap and Diff-Quick stained smears prepared from aspirates of lymph node show abundant necrotic debris, red blood cells, occasional histiocytes, and a small number of atypical epithelial cells with increased nuclear-cytoplasmic ratios. Cytological details are difficult to appreciate due to necrosis. H&E stained cell block sections show mainly necrotic debris and rare clusters of viable epithelial cells with marked atypia. These cells have increased nuclear-cytoplasmic ratios and moderate to severe pleomorphism. No definitive granulomata are seen.     In order to further characterize the neoplastic cells, special stains are performed and the results are as follows:      p63: Positive (rare clusters of viable tumor cells represented)     TTF-1: Negative    34 Beta E12: Positive in majority of  cells, most of which are necrotic ghost cells with cytological features of malignancy      An auramine rhodamine stain for acid fast bacilli is positive. A GMS stain for fungi is negative. A Kinyoun stain for acid fast bacilli is negative.     D) Pap and Diff-Quick stained smears prepared from aspirates of lymph node show blood, necrotic debris, and degenerated epithelial cells with increased nuclear-cytoplasmic ratios. Nuclear pleomorphism is moderate to severe overall. H&E stained cell block sections show blood and several clusters of atypical epithelial cells with increased nuclear-cytoplasmic ratios. Although some clusters have glandular differentiation, others appear distinctively squamoid.     E) A Pap-stained SurePath slide shows several benign-appearing bronchial epithelial cells and background leukocytes.  There are rare clusters of highly atypical epithelial cells.     F) A Pap-stained SurePath slide shows several benign-appearing bronchial epithelial cells and a few scattered  leukocytes. Rare atypical epithelial cells are noted to have increased nuclear-cytoplasmic ratios and coarsely granular nuclear chromatin. Rare clusters have a slight increase in depth of focus. H&E stained cell block sections show mainly proteinaceous debris and leukocytes.    All controls stain appropriately.    Clinical Information Pt with RUL collapse, medistinal mass     Specimen Description       EBUS guided biopsy performed by Dr. Lauri Barboza with 15 passes.    A) subcarinal    4 air dried slides   4 fixed slides   1 CRR    1 cell block    B) mediastinal   3 air dried slides   3 fixed slides   1 CRR    1 cell block    C) R4   4 air dried slides   4 fixed slides   1 CRR    1 cell block    D) 10R    4 air dried slides   4 fixed slides   1 CRR    1 cell block    E) Bronch wash (5 ml cloudy red fluid) - RUL   1 SurePath slide   1 Cell block    F) 1 brush in 30 ml CytoRich Red - RUL   1 SurePath slide   1 Cell block    Specimen Processing      Initial Cytologic Evaluation       Site 1, Episode 1, 3 passes: Blood, bronchial cells.  Site 1, Episode 2, 1 pass: Blood.    Site 2, Episode 1, 1 pass: Atypical.  Site 2, Episode 2, 1 pass: Highly suspicious for malignancy.  Site 2, Episode 3, 1 pass: no adequacy written.    Site 3, Episode 1, pass 1: Necrosis.  Site 3, Episode 2, pass 1: Necrosis.  Site 3, Episode 3, pass 1: Necrosis.  Site 3, Episode 4, pass 1: Few viable cells, suspicious for malignancy. Note: all sites one contiguous mass.    Site 4, Episode 1, pass 1: Blood.  Site 4, Episode 2, pass 1: Blood, necrotic debris.  Site 4, Episode 3, pass 1: Blood.  Site 4, Episode 4, pass 1: no adequacy.           [facility]    Charges       CPT:  88172 x4, 88173 x4, 88305 x6, 88342 x2, 88341 x6, 88312 x3, 88112 x2  ICD-10: C77.1; C34.11    PQRS:       "Result Flag Malignant (!) Normal    General Path Interpretation Positive for malignant cells (!) Negative for malignant cells, Non-Diagnostic   Surgical pathology exam   Result Value Ref Range    Case Report       Surgical Pathology Report                         Case: [id number]                                    Authorizing Provider:  Lauri Barboza MD        Collected:           08/14/2019 1204              Ordering Location:     [facility]      Received:            08/14/2019 0457                                     Respiratory                                                                  Pathologist:           Mark Anthony Linton MD                                                      Specimen:    Endobronchial, RUL                                                                         Final Diagnosis       ENDOBRONCHIAL BIOPSIES, UPPER LOBE OF RIGHT LUNG:    -  INVASIVE SQUAMOUS CELL CARCINOMA, MODERATELY DIFFERENTIATED AND NON-KERATINIZING,     -  SQUAMOUS CELL CARCINOMA IN-SITU ALSO PRESENT    MCSS    Comment       The case is also reviewed by Dr Ambar Lal, who concurs.    Microscopic Description       Microscopic examination performed, substantiating the above diagnosis.    Clinical Information       Clinical history: Pt with right upper lobe lung mass  Reason for procedure:  Diagnostic    Gross Description       The specimen is received in formalin, labeled with the patient's name and designated \"endobronchial biopsies from take-off of RUL,\" and consists of four nubbins of firm pink-tan tissue, each 1-2 mm in diameter. The tissue is totally submitted. JPL:jmb    Charges CPT:  14465  ICD-10: C34.90  PQRS:       Result Flag Malignant (!) Normal   QTF-Mycobacterium tuberculosis by QuantiFERON-TB Gold Plus   Result Value Ref Range    QTF RESULT Negative Negative    QTF INTREPRETATION       No interferon-gamma response to M. tuberculosis antigens was detected.  Infecton with M. " tuberculosis is unlikely.  A negative result alone does not exclude infection with M. tuberculosis    QTF NIL 0.06 IU/mL    QTF ANTIGEN TB1-NIL -0.03 IU/mL    QTF ANTIGEN TB2 - NIL -0.03 IU/mL    QTF MITOGEN-NIL 7.90 IU/mL   Culture/Stain, Mycobacterium, Respiratory Specimen   Result Value Ref Range    AFB Stain No acid fast bacilli seen    Culture/Stain, Mycobacterium, Respiratory Specimen   Result Value Ref Range    AFB Stain No acid fast bacilli seen    Culture/Stain, Mycobacterium, Respiratory Specimen   Result Value Ref Range    AFB Stain No acid fast bacilli seen    Read PPD   Result Value Ref Range    TB Skin Test Negative Negative, See Separate Report, Invalid, Equivocal, Invalid Result:  Specimen set-up for culture, ERROR   Comprehensive Metabolic Panel   Result Value Ref Range    Sodium 138 136 - 145 mmol/L    Potassium 4.2 3.5 - 5.0 mmol/L    Chloride 107 98 - 107 mmol/L    CO2 24 22 - 31 mmol/L    Anion Gap, Calculation 7 5 - 18 mmol/L    Glucose 141 (H) 70 - 125 mg/dL    BUN 34 (H) 8 - 28 mg/dL    Creatinine 1.97 (H) 0.70 - 1.30 mg/dL    GFR MDRD Af Amer 40 (L) >60 mL/min/1.73m2    GFR MDRD Non Af Amer 33 (L) >60 mL/min/1.73m2    Bilirubin, Total 0.3 0.0 - 1.0 mg/dL    Calcium 9.9 8.5 - 10.5 mg/dL    Protein, Total 7.1 6.0 - 8.0 g/dL    Albumin 3.3 (L) 3.5 - 5.0 g/dL    Alkaline Phosphatase 79 45 - 120 U/L    AST 11 0 - 40 U/L    ALT <9 0 - 45 U/L   Lactate Dehydrogenase (LDH)   Result Value Ref Range    LD (LDH) 132 125 - 220 U/L   HM1 (CBC with Diff)   Result Value Ref Range    WBC 6.7 4.0 - 11.0 thou/uL    RBC 3.70 (L) 4.40 - 6.20 mill/uL    Hemoglobin 10.9 (L) 14.0 - 18.0 g/dL    Hematocrit 33.9 (L) 40.0 - 54.0 %    MCV 92 80 - 100 fL    MCH 29.5 27.0 - 34.0 pg    MCHC 32.2 32.0 - 36.0 g/dL    RDW 15.2 (H) 11.0 - 14.5 %    Platelets 276 140 - 440 thou/uL    MPV 10.7 8.5 - 12.5 fL    Neutrophils % 71 (H) 50 - 70 %    Lymphocytes % 15 (L) 20 - 40 %    Monocytes % 8 2 - 10 %    Eosinophils % 5 0 - 6 %     Basophils % 1 0 - 2 %    Neutrophils Absolute 4.8 2.0 - 7.7 thou/uL    Lymphocytes Absolute 1.0 0.8 - 4.4 thou/uL    Monocytes Absolute 0.5 0.0 - 0.9 thou/uL    Eosinophils Absolute 0.3 0.0 - 0.4 thou/uL    Basophils Absolute 0.1 0.0 - 0.2 thou/uL       Imaging Results    Ct Chest Without Contrast    Result Date: 7/31/2019  EXAM: CT CHEST WO CONTRAST LOCATION: [facility][1] DATE/TIME: 7/31/2019 9:42 AM INDICATION: Cough lung mass in [place] identified on CT scan of chest COMPARISON: None. TECHNIQUE: Helical images were obtained through the chest. Multiplanar reformats were obtained. Dose reduction techniques were used. CONTRAST: None. Renal insufficiency. FINDINGS: LUNGS AND PLEURA: There appears to be a central right suprahilar mass with complete atelectasis of right upper lobe. Without contrast it is very difficult to discern central malignancy from the surrounding atelectatic lung but likely the central mass is 3 cm in size. The right upper lobe bronchus appears occluded near its origin. An 8 mm rounded nodule is present within left lower lobe on image 113. Severe emphysema present. MEDIASTINUM: There are a few small scattered mediastinal lymph nodes none are abnormally large. Heart size normal. Atherosclerotic calcifications of aorta and coronary arteries. LIMITED UPPER ABDOMEN: Negative. MUSCULOSKELETAL: No suspicious bony lesions.     CONCLUSION: 1.  Findings consistent with obstructing central right suprahilar mass and complete atelectasis right upper lobe. This lesion would be best approached bronchoscopically. 2.  8mm rounded indeterminate nodule at left lung base. 3.  Consider PET CT evaluation for staging. 4.  Severe emphysema.     Nm Pet Ct Skull To Mid Thigh    Result Date: 8/9/2019  EXAM: NM PET CT SKULL TO MID THIGH LOCATION: [facility] DATE/TIME: 8/9/2019 9:35 AM INDICATION: Pulmonary nodule, right upper lobe. Central right upper lobe mass. Initial treatment strategy. COMPARISON: CT  chest 07/31/2019 reviewed. TECHNIQUE: Serum glucose level 101 mg/dL. One hour post intravenous administration of 8.6 mCi F-18 FDG, PET imaging was performed from the skull base to the mid thighs utilizing attenuation correction with concurrent axial CT and PET/CT image fusion. Dose reduction techniques were used. FINDINGS: Mass in the central right upper lobe measuring approximately 4.3 x 4.7 x 5.6 cm demonstrates marked uptake (SUVmax 14.9), highly suspicious for malignancy. Mass involves the right suprahilar region and right lower paratracheal mediastinum by direct extension. Associated complete right upper lobe atelectasis. Separate FDG avid right lower paratracheal station 4 and subcarinal station 7 (SUVmax 7.2), adenopathy suspicious for arik metastases. Few prominent right anterior cardiophrenic nodes with low-level uptake are indeterminate but suspicious for additional early arik metastases. Mild areas of subpleural thickening in the posterior right chest associated with low-level but appreciable uptake, suspicious for early pleural/subpleural metastases. No pleural effusion. No suspicious focal uptake in the liver skeleton. Normal adrenal glands. Severe emphysema. Moderate atherosclerotic calcifications including the coronary arteries. 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 Marked prostate gland enlargement. Large left scrotal hydrocele. Moderate scattered changes in the spine.     CONCLUSION: Findings suspicious for central right upper lobe lung cancer with direct mediastinal extension as well as metastases involving thoracic lymph nodes including mediastinum and possibly right pericardial region, and possibly right pleura.         Signed by: Jada Lopez MD

## 2021-05-31 NOTE — PROCEDURES
Procedures    FIBEROPTIC BRONCHOSCOPY PROCEDURE NOTE (NON-OR)  Date of Procedure: 8/14/2019  Performing Physician: Lauri Barboza  Pre-Procedure Diagnosis:   RUL lung mass  Post-Procedure Diagnosis:  Same as Pre-Procedure Diagnosis  Procedure:  Diagnostic Flexible Fiberoptic Bronchoscopy   Indications:  Jared Moeller is a 78 y.o. male with RUL lung mass  Preop evaluation:  Procedure:  Intravenous Sedation.    Expected level:  Moderate sedation  ASA Class:  ASA 2 - Patient with mild systemic disease with no functional limitations  Mallampati:  II (hard and soft palate, upper portion of tonsils anduvula visible).  Anesthesia: 2.5 mg of versed, 225 mcg fentanyl, 19 cc of 1% lidociane, and 15 cc of 4% lidocaine (including the nebs)  Specimen:  EBUS TBNA from right mediastinal mass (pretracheal), subcarinal, R4, and R10.  Brush from take of RUL, endbronchial biospies x 4 from right upper lobe take off, and bronchial wash from RUL.  Estimated Blood Loss:  Minimal ml  Complications:  none    Findings:  Vocal Cords  Normal    Trachea  Normal    Deepali  Normal    Right Bronchial Tree  Abnormal, completely occluded RUL with extrinsic compression. scope could not be passed.    Left Bronchial Tree  Normal      Procedure Details:   The patient was seen and the risks, benefits, complications, treatment options, and expected outcomes were discussed with PATIENT   . The risks and potential complications of their problem and proposed treatment include but are not limited to infection, bleeding, pain, adverse drug reaction, pulmonary aspiration, the need for additional procedures, failure to diagnose a condition, creating a complication requiring transfusion or operation, and complication secondary to the anesthetic.  The patient/alternate (see above) concurred with the proposed plan, giving informed consent.  The patient was identified as Jared Moeller with Date of Birth 1940 and the procedure verified as Diagnostic  Flexible Fiberoptic Bronchoscopy .  A Time Out was held and the above information confirmed.    After application of topical nasopharyngeal anesthesia, the patient was placed in the supine position and the bronchoscope was passed through the mouth.  The vocal cords were visualized . The cord findings are noted above.  The scope was then passed into the trachea.  Careful inspection of the tracheal lumen was accomplished. The scope was sequentially passed into the left main and then left upper and lower bronchi and segmental bronchi.   Findings and specimen details recorded above.  The scope was then withdrawn and advanced into the right main bronchus. The right upper lobe was completely obstructed from extrinsic compression. I was unable to pass scope.  The RML and RLL bronchi and segemental bronchi were normal.    EBUS scope was then exchanged for regular scope. A large pretrachial mass was identified that measure at least 4x5 cm. A large subcarinal mass 2x2 cm was identified. A 2x2 cm R4 was identified and right hilar node was enlarged and contiguous with the right upper lobe lung mass.     Additional Procedures performed in the following order.     EBUS TBNA x 4 passes from subcarinal mass  EBUS TBNA x 4 passes from right mediastinal mass  EBUS TBNA x 4 passes from R4  EBUS TBNA x 4 passes from R10  Endobronchial biopsies x 4 passes from take off of right upper lobe  Brush (for cytology) x 1 from take off of RUL  Bronchial wash from Right upper lobe  (50 cc, then 20 cc). 5 cc blood return in total.    Combination and of suction and no suction was used throughout the procedure. There were a lot of necrotic cells but sufficient lymphoid tissue was present.    The patient tolerated the procedure well.      Lauri Barboza, 8/14/2019, 10:04 AM  Dr Ruiz assisted during procedure.    Referring Physician: Lauri Barboza  Attending Physician: Lauri Barboza MD  Primary Care Physician: Provider, No Primary Care

## 2021-06-01 NOTE — PROGRESS NOTES
I met with Art and his spouse, Kristen, in the radiation clinic today.  He feels treatment is going well.  It's been an adjustment for him having so many appts each week but they are getting along ok.  He has an appt with his new primary care provider this afternoon and will meet with our dietician next week.  Art and Pat deny a need for additional resources at this time.  They have my card and I invited calls for questions or needs.

## 2021-06-01 NOTE — PROGRESS NOTES
"PULMONARY CLINIC FOLLOW UP NOTE    History:     HPI: Jared Moeller is a 78 y.o. male  who is here for follow up.  Patient lives in [place] in the winter and in [place] in the summer.  He was found to have \"spot\" in his lung about 3 to 4 years ago and was followed closely back in [place].  He noted that he had a CT scan of the chest 6 months ago that showed no change.  However he had started developing weight loss, approximate 15 pounds over the past 6 months.  He also had a cough that was nonproductive and denies any hemoptysis.  This led to a CT scan of the chest that was done in [place] and showed right upper lobe lung mass.  He then came to [place] for further evaluation.    Interval History: pt is here for his scheduled follow up.  No hospitalization since he was last seen.  He has a cough but denies any hemoptysis.  He has been following up with oncology and has an appointment scheduled with infectious disease next week.  He is on Spiriva for his COPD.  He is wondering whether he could stop Spiriva and have a trial without Spiriva and monitor symptoms.       PMHx/PSHx:  COPD  Seasonal allergies  PVD  HTN  CKD  H/o polio  H/o inguinal hernia      Social Hx:    Lives in [place] in the winter and [place] in the summer  Used to smoke 1 pack per day and quit 4 years ago    ROS: 10 point review of system done. Pertinent findings are noted in the HPI.    Exam/Data:   /60   Pulse 88   Resp 12   Ht 5' 4\" (1.626 m)   Wt 121 lb (54.9 kg)   SpO2 95%   BMI 20.77 kg/m  , Body mass index is 20.77 kg/m .  GEN: comfortable, NAD  HEENT: NCAT, EMOI, mmm  CVS: S1S2, RRR  Lung: fair air entry  Abd: soft, nt, + BS. No masses  Ext: no c/c/e  Neuro: nonfocal  Skin: no visible rash  Vasc: intact radial pulses bilaterally  Musculoskeletal: FROM all extremities  Psych: normal affect    Data:   Labs and Imaging personally reviewed.  Pertinent findings include:      Mr Brain With Without Contrast  1.  No findings to " "suggest intracranial metastases. 2.  No evidence of an acute intracranial abnormality. 3.  Findings compatible with mild chronic small vessel ischemic change.     Nm Pet Ct Skull To Mid Thigh  Result Date: 8/9/2019  CONCLUSION: Findings suspicious for central right upper lobe lung cancer with direct mediastinal extension as well as metastases involving thoracic lymph nodes including mediastinum and possibly right pericardial region, and possibly right pleura.       PFT;s:  FEV1/FVC is 53 and is reduced.  FEV1 is 74% predicted and is reduced.  FVC is 105% predicted and is normal.  There was no improvement in spirometry after a single inhaled dose of bronchodilator.  TLC is 115% predicted and is normal.  RV is 132% predicted and is increased.  DLCO is 49% predicted and is reduced when it   is corrected for hemoglobin.  The flow volume loop is normal shows severe scooping  Impression:  Full Pulmonary Function Test is abnormal. Spirometry is consistent with mild obstructive ventilatory defect.  Spirometry is not consistent with reversibility.  There is no hyperinflation.  There is air-trapping.  Diffusion capacity when corrected for hemoglobin is moderately reduced.      Assessment/Plan:     Jared Moeller is a 78 y.o. male , former smoker, with history of lung mass that has been stable over the past several years until most recently showing marked growth.    Had been followed in [place].  We only have one CT scan of the chest that we were just able to upload it done in 6/2019.  He had a CT scan of the chest done here in [place] on 7/2019, few days ago.  This confirmed the right upper lobe lung mass which is obstructing his right upper lobe.  There is also e/o severe emphysema.  Unfortunately, we don't have his previous CT chest imaging when he was told he had a \"spot\" 3-4 years ago. The only CT chest that we were finally able to upload is the most recent that he had in [place] showing a large suprahilar mass with " complete atelectasis of the RUL.  PFT's as noted above with mild to moderate obstructive picture.    Pt had a PET scan and this was followed by bronch/ebus. TBNA FNA from mediastinal mass, R4, and R10, and lavage/brush were c/w squamous cell carcinoma. Immunohistochemical stain P63 was positive, TTF-1 was negative.    Of note, he had auramine-rhodamine stain on R4 that was positive for AFB. However, Kinyoun stainand GMS stain for AFB was negative. This was sent to ID by MD and stain came back negative. Quantiferon was also negative.    Recommendations:  Chemoradiation per heme/con  Following up with ID for his positive auramine-rhodamine stain  Okay to hold spiriva and res-start if symptomatic    FOLLOW UP: 6 months    Lauri Barboza MD  Pulmonary and Critical Care Medicine  Electronically Signed on 9/6/2019    Current Outpatient Medications   Medication Sig Dispense Refill     cilostazol (PLETAL) 100 MG tablet Take 1 tablet (100 mg total) by mouth 2 (two) times a day. 180 tablet 3     epinephrine (EPIPEN INJ) Inject as directed as needed.       Lactobacillus rhamnosus GG (CULTURELLE) 15 billion cell CpSP Take 1 capsule by mouth 2 (two) times a day with meals.       loratadine (CLARITIN) 10 mg tablet Take 10 mg by mouth daily.       prochlorperazine (COMPAZINE) 10 MG tablet Take 1 tablet (10 mg total) by mouth every 6 (six) hours as needed (For breakthrough nausea/vomiting). 30 tablet 1     telmisartan-hydrochlorothiazide (MICARDIS HCT) 80-12.5 mg per tablet Take 1 tablet by mouth daily. 90 tablet 3     tiotropium (SPIRIVA WITH HANDIHALER) 18 mcg inhalation capsule Place 1 capsule (2 puffs total) into inhaler and inhale daily. 90 capsule 3     No current facility-administered medications for this visit.      Allergies   Allergen Reactions     Venom-Honey Bee Anaphylaxis       Meds and Allergies: See EHR for the updated medication list and Allergies. These were reviewed.     Much or all of the text in this note was  generated through the use of the Dragon Dictate voice-to-text software. Errors in spelling or words which seem out of context are unintentional. Sound alike errors, in particular, may have escaped editing.

## 2021-06-01 NOTE — PROCEDURES
[facility]    Procedure: IR Procedure Note  Date/Time: 9/11/2019 10:20 AM  Performed by: Mulugeta Melendrez MD  Authorized by: Mulugeta Melendrez MD       Universal Protocol    Site marked: NA    Prior images obtained and reviewed: NA    Required items: required blood products, implants, devices, and special equipment available    Patient identity confirmed: verbally with patient, arm band, provided demographic data and hospital-assigned identification number    Reevaluation: Patient was reevaluated immediately before administering moderate or deep sedation or anesthesia    Confirmation checklist: patient's identity using two indicators, relevant allergies and procedure was appropriate and matched the consent or emergent situation    Time out: Immediately prior to procedure a time out was called to verify the correct patient, procedure, equipment, support staff and site/side marked as required    Universal Protocol: Joint Commission Universal Protocol was followed    Preparation: Patient was prepped and draped in the usual sterile fashion    ESBL (mL): 0    Anesthesia    Local anesthesia used?: Yes    Local anesthetic: lidocaine 1% without epinephrine    Anesthetic total (mL): 15    Sedation    Patient sedation: Yes    Sedation type: moderate (conscious) sedation    Sedation: fentanyl and midazolam    Vital signs: Vital signs monitored during sedation  Specimens: none  Complications: None  Condition: Stable    Post-procedure    Description of procedure: Port placement left    Patient tolerance: Patient tolerated the procedure well with no immediate complications   Length of time physician present for 1:1 monitoring during sedation: 25

## 2021-06-01 NOTE — PROGRESS NOTES
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    Assessment and Plan  Cancer Staging  Primary cancer of right upper lobe of lung (H)  Staging form: Lung, AJCC 8th Edition  - Clinical stage from 8/20/2019: Stage IIIB (cT4, cN2, cM0) - Signed by Jada Lopez MD on 9/4/2019      ECOG Performance   ECOG Performance Status: 1    Distress Assessment  Distress Assessment Score: 2: Concerns about his medical issues.  Please see the discussion below.    Pain  Pain Score (Initial OR Reassessment): 1: No pain.    Mr. Jared Moeller is a 78 y.o. gentleman with COPD, history of smoking which he quit in 2015.  He had some old lung nodules but was found in July 2019 to have a right upper lobe lung mass, in the right suprahilar area contiguous with the mediastinum.  The mass in the PET CT scan was 5.6 x 4.7 x 4.3 cm in size and PET positive.  This involved the right suprahilar region and a right lower paratracheal mediastinum with direct extension.  There was complete collapse of the right upper lobe with involvement of the mediastinum, possibly the right pericardial region and possibly the right pleura in the right lower lobe region.  The FNA done through the endobronchial ultrasound on 8/14/2019 showed moderately differentiated squamous cell cancer of the lung.  The mediastinal lymph nodes were also positive.    He has a chronic kidney disease stage III.  I think he has some sort of primary kidney disorder.  Likely related to the nephritis that he had as a child.  He has been referred to nephrology and was seen by Dr. Rubin of [facility] [place].  Work-up is ongoing.   Our plan is for radiation along with chemotherapy with weekly carboplatin AUC of 2 and paclitaxel at 50 mg/m .  After he is done with chemotherapy and radiation we will go with  Durvalumab IV given every 2 weeks.  He started chemotherapy and radiation on 9/9/2019.    1.  He is here for the fourth week of weekly chemotherapy with carboplatin and Taxol along with radiation.  Overall I think he has tolerated chemotherapy and radiation well so far.  He is developing some dysphagia, which I think is due to radiation esophagitis.  Labs from today were reviewed.  Blood counts are overall stable.  Hemoglobin is 9.6.  W BC count is slightly low at 3.8 but he has an ANC of 3000.  Platelet count is 187,000.  Metabolic panel shows stability of his kidney function with a creatinine of 1.63.  LFTs are also stable.  We will proceed with week 4 of carboplatin and Taxol chemotherapy today.    2.  He asked me about immunizations.  He will receive the seasonal flu vaccine today.  I told him that it is okay for him to receive the pneumonia vaccines and the Shingrix vaccine proposed by his primary doctor.  However there is no hurry to have those done and he can have them done at his convenience.    3.  He felt that he had a sore throat about a week ago due to a cold.  However the symptoms are now progressed to the point where he has difficulty swallowing.  I think he is starting to develop some radiation esophagitis.  He has tried salt and soda rinses but he felt that it made it hurt more.  I am giving him a prescription for Magic mouthwash which he can use as needed.    4.  His blood pressures are running somewhat low.  I discussed with the patient and his wife that I think he can hold the telmisartan for the time being.  I suspect the blood pressures are running low because he is not eating that well.  Once things improve and his blood pressures start coming up then we can restart telmisartan.  They voiced understanding.    5.  We discussed about the overall plan of care.  At this point will continue with chemotherapy once weekly while he is on radiation.  At this time it appears that radiation will be  completed on October 18.  If that is so then he will not need chemotherapy on October 20.  However if he needs a boost, then he would likely need chemotherapy on October 20.  He voiced understanding.  I also discussed with him that I do not think that he will be feeling very good when you are done with chemotherapy and radiation as this is the time I would expect him to start having side effects from radiation and chemotherapy.  I think he may not be in a good enough shape to go to [place] after the chemotherapy and radiation is completed.  Another issue is that we do want to get him started on durvalumab treatment once he is done with chemotherapy and radiation.  That is likely to be prohibitively expensive in [place].  Finally he decided that if he is feeling good then he can take a vacation for a couple of weeks after completion of chemotherapy and radiation.  He is not anticipating going to [place] for a few months as they were planning earlier.  His wife states that they have already cleared out the apartment that they had in [place].    6.  Return to clinic with MD or NP in a week's time for the next week's chemotherapy.  Labs according to treatment plan.  She is to be scheduled likewise for the next 3 weeks.    Time spend >25 minutes face to face with the patient. More than 50 % in counseling and coordination of care.    Problem List    1. Primary cancer of right upper lobe of lung (H)  CC OFFICE VISIT LONG    alum/mag hydrox-simethicone-diphenhydramine-lidocaine (MAGIC MOUTHWASH) suspension    CC OFFICE VISIT LONG    CC OFFICE VISIT LONG    Infusion Appointment   2. Encounter for antineoplastic chemotherapy     3. Radiation esophagitis  alum/mag hydrox-simethicone-diphenhydramine-lidocaine (MAGIC MOUTHWASH) suspension   4. Pharyngoesophageal dysphagia  alum/mag hydrox-simethicone-diphenhydramine-lidocaine (MAGIC MOUTHWASH) suspension        CC: Maria De Jesus Fregoso MD Elizabeth Cameron, MD Andrew Hipp,  DO    ______________________________________________________________________________    History of Present Illness    Mr. Jared Moeller is here for follow-up with his wife.    He says that he has developed some sore throat for about a week.  Initially he thought it was from a cold because he had some nasal congestion also.  However the soreness of the throat has progressed.  It is raised the point where he has some pain on swallowing.    However food is not getting stuck.    He says that his breathing is okay.  He still has a cough at about the same level as earlier.  Brings up some phlegm that is clear.    He says that with the initial weeks of chemotherapy he did have some nausea but no days nausea is under very good control with the antinausea medications.    His blood pressures are running somewhat low.  They are holding the telmisartan for the last 3 days because of that.    No fevers.  No night sweats.  No lumps or bumps anywhere.  No unusual headaches.  No eyesight problems.  No mouth sores.  The mild chest discomfort that he had earlier has gone away now.  No abdominal pain.  No complaints of constipation or diarrhea.  No difficulty with urination.  No skin rashes.  No numbness or tingling.    Please see below.  A 14 point review of system is otherwise completely negative.      Pain Status  Currently in Pain: Yes    Review of Systems    Constitutional  Constitutional (WDL): Exceptions to WDL  Fatigue: Fatigue relieved by rest  Fever: None  Chills: None  Weight Gain: None  Weight Loss: to <10% from baseline, intervention not indicated(1#)  Neurosensory  Peripheral Motor Neuropathy: None  Ataxia: None  Peripheral Sensory Neuropathy: Asymptomatic, loss of deep tendon reflexes or paresthesia(chronic Lt foot bunion- rare)  Confusion: None  Syncope: None  Cardiovascular  Cardiovascular (WDL): Exceptions to WDL(tachy)  Palpitations: None  Edema: No  Phlebitis: None  Superficial thrombophlebitis:  None  Pulmonary  Cough: Mild symptoms, nonprescription intervention indicated(PND)  Dyspnea: Shortness of breath with moderate exertion  Hypoxia: None  Gastrointestinal  Gastrointestinal (WDL): Exceptions to WDL  Anorexia: Loss of appetite without alteration in eating habits  Constipation: None  Diarrhea: None  Dysphagia: Symptomatic and altered eating/swallowing  Esophagitis: Asymptomatic, clinical or diagnostic observations only, intervention not indicated(uses Pepcid)  Nausea: Loss of appetite without alteration in eating habits  Pharyngitis: Localized, local intervention indicated (e.g., topical antibiotic, antifungal, or antiviral)  Vomiting: None  Dysgeusia: Altered taste but no change in diet  Genitourinary  Genitourinary (WDL): All genitourinary elements are within defined limits  Integumentary  Integumentary (WDL): All integumentary elements are within defined limits  Patient Coping     Distress Assessment  Distress Assessment Score: 2  Accompanied by  Accompanied by: Family Member    Past History  Past Medical History:   Diagnosis Date     BPH (benign prostatic hyperplasia)      Carotid stenosis, bilateral 04/07/2016    left 50-69%. moderate right side.     Chronic kidney disease (CKD), stage III (moderate) (H)      Colon polyps      COPD (chronic obstructive pulmonary disease) (H)      Epididymal cyst, left side. 04/07/2016     Hematuria 12/2002    Work-up was negative.     Hypertension      Inguinal hernia     Right     Nephritis     11 years old.     Peripheral vascular disease (H)      Polio     6 years of age. Right leg. Recovered fully.     Shingles 1996    left forehead.         Past Surgical History:   Procedure Laterality Date     COLONOSCOPY       CYSTOSCOPY  2002     FLEXIBLE BRONCHOSCOPY  08/14/2019    with EBUS FNA>     IR EXTREMITY ANGIOGRAM BILATERAL  04/06/2016     IR PORT PLACEMENT >5 YEARS  9/11/2019     NASAL SEPTUM SURGERY  1980s     TONSILLECTOMY  1948       Physical Exam    Recent  Vitals 9/30/2019   Weight 119 lbs 3 oz   BSA (m2) 1.56 m2   /57   Pulse 121   Temp 97.8   Temp src 1   SpO2 97   Some recent data might be hidden       GENERAL: Alert and oriented to time place and person. Seated comfortably. In no distress.  He looks well overall.  Thin build.    HEAD: Atraumatic and normocephalic.    EYES: DANYA, EOMI.  No pallor.  No icterus.    Oral cavity: no mucosal lesion or tonsillar enlargement.    NECK: supple. JVP normal.  No thyroid enlargement.    LYMPH NODES: No palpable, cervical, axillary or inguinal lymphadenopathy.    CHEST: clear to auscultation bilaterally.  Resonant to percussion throughout bilaterally.  Symmetrical breath movements bilaterally.    Port-A-Cath over the left upper chest looks unremarkable.    CVS: S1 and S2 are heard. Regular rate and rhythm.  No murmur or gallop or rub heard.  No peripheral edema.    ABDOMEN: Soft. Not tender. Not distended.  No palpable hepatomegaly or splenomegaly.  No other mass palpable.  Bowel sounds heard.    EXTREMITIES: Warm.    SKIN: no rash, or bruising or purpura.  Has a full head of hair.          Lab Results    Recent Results (from the past 168 hour(s))   Comprehensive Metabolic Panel   Result Value Ref Range    Sodium 137 136 - 145 mmol/L    Potassium 4.2 3.5 - 5.0 mmol/L    Chloride 106 98 - 107 mmol/L    CO2 23 22 - 31 mmol/L    Anion Gap, Calculation 8 5 - 18 mmol/L    Glucose 143 (H) 70 - 125 mg/dL    BUN 39 (H) 8 - 28 mg/dL    Creatinine 1.63 (H) 0.70 - 1.30 mg/dL    GFR MDRD Af Amer 50 (L) >60 mL/min/1.73m2    GFR MDRD Non Af Amer 41 (L) >60 mL/min/1.73m2    Bilirubin, Total 0.2 0.0 - 1.0 mg/dL    Calcium 9.5 8.5 - 10.5 mg/dL    Protein, Total 6.7 6.0 - 8.0 g/dL    Albumin 3.2 (L) 3.5 - 5.0 g/dL    Alkaline Phosphatase 81 45 - 120 U/L    AST 12 0 - 40 U/L    ALT 12 0 - 45 U/L   HM1 (CBC with Diff)   Result Value Ref Range    WBC 3.8 (L) 4.0 - 11.0 thou/uL    RBC 3.27 (L) 4.40 - 6.20 mill/uL    Hemoglobin 9.6 (L) 14.0 -  18.0 g/dL    Hematocrit 30.1 (L) 40.0 - 54.0 %    MCV 92 80 - 100 fL    MCH 29.4 27.0 - 34.0 pg    MCHC 31.9 (L) 32.0 - 36.0 g/dL    RDW 15.3 (H) 11.0 - 14.5 %    Platelets 187 140 - 440 thou/uL    MPV 9.6 8.5 - 12.5 fL    Neutrophils % 78 (H) 50 - 70 %    Lymphocytes % 11 (L) 20 - 40 %    Monocytes % 8 2 - 10 %    Eosinophils % 2 0 - 6 %    Basophils % 1 0 - 2 %    Neutrophils Absolute 3.0 2.0 - 7.7 thou/uL    Lymphocytes Absolute 0.4 (L) 0.8 - 4.4 thou/uL    Monocytes Absolute 0.3 0.0 - 0.9 thou/uL    Eosinophils Absolute 0.1 0.0 - 0.4 thou/uL    Basophils Absolute 0.0 0.0 - 0.2 thou/uL       Imaging    Ir Port Placement 5+ Years    Result Date: 9/11/2019  [place] RADIOLOGY LOCATION: [facility] DATE: 9/11/2019 PROCEDURE: 1.  CENTRAL VENOUS PORT PLACEMENT 2.  FLUOROSCOPIC GUIDANCE FOR CATHETER PLACEMENT 3.  ULTRASOUND GUIDANCE FOR VASCULAR ACCESS 4.  CONSCIOUS SEDATION INTERVENTIONAL RADIOLOGIST: Mulugeta Melendrez MD. INDICATION: Lung cancer, port for chemotherapy. SEDATION: Versed 1.5 mg. Fentanyl 50 mcg. The procedure was performed with administration intravenous conscious sedation with appropriate preoperative, intraoperative, and postoperative evaluation. During the time-out, immediately prior to administration  of medications, the patient was reassessed for adequacy to receive conscious sedation. 23 minutes of supervised face to face conscious sedation time was provided by a radiology nurse under my direct supervision. ANTIBIOTICS: None Number of images: 1 FLUOROSCOPIC TIME: 0.5 minutes CONTRAST: None. COMPLICATIONS: No immediate complications. STERILE BARRIER TECHNIQUE: Maximum sterile barrier technique was used. Cutaneous antisepsis was performed at the operative site with application of 2% chlorhexidine and large sterile drape. Prior to the procedure, the surgeon and assistant performed hand  hygiene and wore hat, mask, sterile gown, and sterile gloves during the entire procedure. PROCEDURE:  Ultrasound demonstrated a patent and compressible left internal jugular vein, and an ultrasound image was obtained and placed in the patient's permanent medical record. The left neck and chest were prepped and draped in usual sterile fashion. Using real-time ultrasound guidance, the left internal jugular vein was accessed. A subcutaneous pocket was created and irrigated with sterile normal saline. The catheter was tunneled in an antegrade fashion. Over the guidewire, a peel-away sheath was advanced  with fluoroscopic monitoring. Through the peel-away sheath, the catheter was advanced until the tip was at the cavoatrial junction. The catheter was cut to length and attached firmly to the port. The incisions were closed with layered absorbable suture and surgical glue. FINDINGS: Ultrasound shows an anechoic and compressible jugular vein. At the completion of the study, the port tip lies at the cavoatrial junction. TYPE OF PORT:  Smith power PAC port     Successful power-injectable venous port placement. CPT codes for physician reference only: 70364 [id number]        Signed by: Jada Lopez MD

## 2021-06-01 NOTE — PROGRESS NOTES
Patient here today for labs, OV with REBEKA Mendenhall, NITO, and D1C1 paxlitaxel, carboplatin for lung CA/MARIELENA Zuniga RN

## 2021-06-01 NOTE — PROGRESS NOTES
Pt came into infusion clinic for his treatment as ordered. Labs Reviewed. Medications explained to pt who verbalized understanding. Port patent throughout infusion. Pt tolerated infusion with no complications. Pt left infusion clinic via ambulatory and will RTC as sched.

## 2021-06-01 NOTE — PROGRESS NOTES
OFFICE VISIT - FAMILY MEDICINE     ASSESSMENT AND PLAN     1. Establishing care with new doctor, encounter for     2. Peripheral vascular disease (H)  cilostazol (PLETAL) 100 MG tablet   3. Primary cancer of right upper lobe of lung (H)     He is here to establish care, he does have peripheral arterial disease, refill Pletal, possible side effect discussed  Primary right upper lung cancer, getting came on radiation therapy followed by oncologist.  Patient encouraged to check with oncologist if he can receive new Shingrix, influenza, PCV 13 and 23 vaccination.    CHIEF COMPLAINT   Establish Care (patient is doing chemo and radiation for lung cancer.)    HPI   Jared Moeller is a 78 y.o. male.  No Patient Care Coordination Note on file.    Ex patient of Dr. Wallace, here to establish care, has been a smoker up until 2015, had a right upper lungs mass found on CT in [place] last July, currently getting chemo and radiation therapy.  Peripheral arterial disease, controlled with Pletal twice a day for, would like a refill.  Hypertension, controlled with Micardis once a day.      Review of Systems As per HPI, otherwise negative.    OBJECTIVE   /58 (Patient Site: Left Arm, Patient Position: Sitting, Cuff Size: Adult Small)   Pulse (!) 115   Wt 122 lb 8 oz (55.6 kg) Comment: shoes on  SpO2 98%   BMI 21.03 kg/m    Physical Exam   Constitutional: He is oriented to person, place, and time. He appears well-developed and well-nourished.   HENT:   Head: Normocephalic and atraumatic.   Neck: Normal range of motion. Neck supple. No JVD present. No tracheal deviation present. No thyromegaly present.   Cardiovascular: Normal rate, regular rhythm, normal heart sounds and intact distal pulses. Exam reveals no gallop and no friction rub.   No murmur heard.  Pulmonary/Chest: Effort normal and breath sounds normal. No respiratory distress. He has no wheezes. He has no rales.   Musculoskeletal: He exhibits no edema or tenderness.    Lymphadenopathy:     He has no cervical adenopathy.   Neurological: He is alert and oriented to person, place, and time. Coordination normal.   Psychiatric: He has a normal mood and affect. Judgment and thought content normal.       PFSH     Family History   Problem Relation Age of Onset     Ulcers Mother         did not make it after surgery.     Heart disease Father      Other Father         accidental death.     Pacemaker Sister      Other Brother         Polio.     No Medical Problems Son      No Medical Problems Son      Pneumonia Half Brother 81     No Medical Problems Half Brother      Multiple sclerosis Sister      No Medical Problems Sister      Social History     Socioeconomic History     Marital status:      Spouse name: Not on file     Number of children: Not on file     Years of education: Not on file     Highest education level: Not on file   Occupational History     Occupation: Retired.     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   Social Needs     Financial resource strain: Not on file     Food insecurity:     Worry: Not on file     Inability: Not on file     Transportation needs:     Medical: Not on file     Non-medical: Not on file   Tobacco Use     Smoking status: Former Smoker     Packs/day: 1.00     Years: 50.00     Pack years: 50.00     Last attempt to quit: 2015     Years since quittin.6     Smokeless tobacco: Never Used     Tobacco comment: used E-cigarettes for about a month only.    Substance and Sexual Activity     Alcohol use: Yes     Alcohol/week: 1.8 oz     Types: 3 Standard drinks or equivalent per week     Comment: no more than 2 daily but not every day     Drug use: No     Sexual activity: Not on file   Lifestyle     Physical activity:     Days per week: Not on file     Minutes per session: Not on file     Stress: Not on file   Relationships     Social connections:     Talks on phone: Not on file     Gets together: Not on file     Attends Spiritism service: Not on file      Active member of club or organization: Not on file     Attends meetings of clubs or organizations: Not on file     Relationship status: Not on file     Intimate partner violence:     Fear of current or ex partner: Not on file     Emotionally abused: Not on file     Physically abused: Not on file     Forced sexual activity: Not on file   Other Topics Concern     Not on file   Social History Narrative     Not on file     Relevant history was reviewed with the patient today, unless noted in HPI, nothing is pertinent for this visit.  [facility]     Patient Active Problem List    Diagnosis Date Noted     Encounter for antineoplastic chemotherapy 09/09/2019     Peripheral vascular disease (H)      Hypertension      Chronic kidney disease (CKD), stage III (moderate) (H)      Primary cancer of right upper lobe of lung (H)      Anaphylaxis Due To Bee Venom      Overview Note:     Created by Conversion         Hypertension      Overview Note:     Created by Conversion    Replacement Utility updated for latest IMO load       Abnormal involuntary movement      Overview Note:     Created by Conversion  [facility] Annotation: Jun 6 2013  9:29AM - Dave Wallace: minor; related to   overuse hands. no resting tremor.         Benign Polyps Of The Large Intestine      Overview Note:     Created by Conversion         Past Surgical History:   Procedure Laterality Date     COLONOSCOPY       CYSTOSCOPY  2002     FLEXIBLE BRONCHOSCOPY  08/14/2019    with EBUS FNA>     IR EXTREMITY ANGIOGRAM BILATERAL  04/06/2016     IR PORT PLACEMENT >5 YEARS  9/11/2019     NASAL SEPTUM SURGERY  1980s     TONSILLECTOMY  1948       RESULTS/CONSULTS (Lab/Rad)     Recent Results (from the past 168 hour(s))   Comprehensive Metabolic Panel   Result Value Ref Range    Sodium 137 136 - 145 mmol/L    Potassium 3.7 3.5 - 5.0 mmol/L    Chloride 108 (H) 98 - 107 mmol/L    CO2 21 (L) 22 - 31 mmol/L    Anion Gap, Calculation 8 5 - 18 mmol/L    Glucose 127 (H) 70 - 125  mg/dL    BUN 26 8 - 28 mg/dL    Creatinine 1.66 (H) 0.70 - 1.30 mg/dL    GFR MDRD Af Amer 49 (L) >60 mL/min/1.73m2    GFR MDRD Non Af Amer 40 (L) >60 mL/min/1.73m2    Bilirubin, Total 0.2 0.0 - 1.0 mg/dL    Calcium 9.4 8.5 - 10.5 mg/dL    Protein, Total 6.8 6.0 - 8.0 g/dL    Albumin 3.1 (L) 3.5 - 5.0 g/dL    Alkaline Phosphatase 73 45 - 120 U/L    AST 12 0 - 40 U/L    ALT 10 0 - 45 U/L   HM1 (CBC with Diff)   Result Value Ref Range    WBC 7.6 4.0 - 11.0 thou/uL    RBC 3.53 (L) 4.40 - 6.20 mill/uL    Hemoglobin 10.2 (L) 14.0 - 18.0 g/dL    Hematocrit 32.5 (L) 40.0 - 54.0 %    MCV 92 80 - 100 fL    MCH 28.9 27.0 - 34.0 pg    MCHC 31.4 (L) 32.0 - 36.0 g/dL    RDW 15.0 (H) 11.0 - 14.5 %    Platelets 274 140 - 440 thou/uL    MPV 10.3 8.5 - 12.5 fL    Neutrophils % 70 50 - 70 %    Lymphocytes % 16 (L) 20 - 40 %    Monocytes % 8 2 - 10 %    Eosinophils % 6 0 - 6 %    Basophils % 1 0 - 2 %    Neutrophils Absolute 5.3 2.0 - 7.7 thou/uL    Lymphocytes Absolute 1.2 0.8 - 4.4 thou/uL    Monocytes Absolute 0.6 0.0 - 0.9 thou/uL    Eosinophils Absolute 0.4 0.0 - 0.4 thou/uL    Basophils Absolute 0.1 0.0 - 0.2 thou/uL     Mr Brain With Without Contrast    Result Date: 8/29/2019  EXAM: MR BRAIN W WO CONTRAST LOCATION: [facility] DATE/TIME: 8/28/2019 12:33 PM INDICATION: Lung cancer, non-small cell, staging Staging of newly diagnosed squamous cell lung cancer. COMPARISON: PET CT 08/09/2019. CONTRAST: 5.5 mL Gadavist. TECHNIQUE: Routine multiplanar multisequence head MRI without and with intravenous contrast. FINDINGS: No abnormal parenchymal, pachymeningeal or leptomeningeal enhancement is demonstrated. There is no evidence of mass effect or midline shift. There are no findings to suggest intracranial hemorrhage. No diffusion abnormalities are demonstrated to suggest acute/subacute infarction. There is mild T2-hyperintense white matter signal abnormality. Although nonspecific, this commonly reflects chronic small vessel  ischemic change. No foci of encephalomalacia are demonstrated. The sella and pituitary are unremarkable for technique. Diffuse and proportionate prominence of the ventricles, sulci and cisterns is compatible with mild generalized parenchymal atrophy. The cerebellar tonsils are appropriately positioned with a patent foramen magnum. The major intracranial flow voids are unremarkable. There is mild mucosal thickening of the paranasal sinuses. The mastoid air cells are unremarkable in signal. The globes and orbits appear intact. The calvarium and extracranial soft tissues are also unremarkable. Limited images of the upper cervical spine demonstrate no acute abnormality.     1.  No findings to suggest intracranial metastases. 2.  No evidence of an acute intracranial abnormality. 3.  Findings compatible with mild chronic small vessel ischemic change.     Us Kidney Bilateral    Result Date: 8/30/2019  EXAM: US KIDNEY BILATERAL WITH BLADDER LOCATION: [facility] DATE/TIME: 8/30/2019 3:07 PM INDICATION: Chronic kidney disease, stage 4 (severe) COMPARISON: None. TECHNIQUE: Routine kidneys and bladder. FINDINGS: Right kidney measures 8.2 x 4.5 x 4.5 cm. The kidney is diffusely increased in echogenicity without evidence of focal lesion or hydronephrosis. Left kidney measures 7.7 x 5.2 x 3.9 cm. The kidneys diffusely increased in echogenicity without evidence of focal lesion or hydronephrosis. Bladder is normal with an enlarged prostate gland seen deep to it. The prostate gland measures 3 x 4 x 3.8 cm.     CONCLUSION: 1.  Both kidneys appear echogenic suggesting medical renal disease. There is mild bilateral renal atrophy. 2.  Enlarged prostate gland.    Ir Port Placement 5+ Years    [facility] Date: [facility]11/2019  [facility] DATE: 9/11/2019 PROCEDURE: 1.  CENTRAL VENOUS PORT PLACEMENT 2.  FLUOROSCOPIC GUIDANCE FOR CATHETER PLACEMENT 3.  ULTRASOUND GUIDANCE FOR VASCULAR ACCESS 4.  CONSCIOUS  SEDATION INTERVENTIONAL RADIOLOGIST: Mulugeta Melendrez MD. INDICATION: Lung cancer, port for chemotherapy. SEDATION: Versed 1.5 mg. Fentanyl 50 mcg. The procedure was performed with administration intravenous conscious sedation with appropriate preoperative, intraoperative, and postoperative evaluation. During the time-out, immediately prior to administration  of medications, the patient was reassessed for adequacy to receive conscious sedation. 23 minutes of supervised face to face conscious sedation time was provided by a radiology nurse under my direct supervision. ANTIBIOTICS: None Number of images: 1 FLUOROSCOPIC TIME: 0.5 minutes CONTRAST: None. COMPLICATIONS: No immediate complications. STERILE BARRIER TECHNIQUE: Maximum sterile barrier technique was used. Cutaneous antisepsis was performed at the operative site with application of 2% chlorhexidine and large sterile drape. Prior to the procedure, the surgeon and assistant performed hand  hygiene and wore hat, mask, sterile gown, and sterile gloves during the entire procedure. PROCEDURE: Ultrasound demonstrated a patent and compressible left internal jugular vein, and an ultrasound image was obtained and placed in the patient's permanent medical record. The left neck and chest were prepped and draped in usual sterile fashion. Using real-time ultrasound guidance, the left internal jugular vein was accessed. A subcutaneous pocket was created and irrigated with sterile normal saline. The catheter was tunneled in an antegrade fashion. Over the guidewire, a peel-away sheath was advanced  with fluoroscopic monitoring. Through the peel-away sheath, the catheter was advanced until the tip was at the cavoatrial junction. The catheter was cut to length and attached firmly to the port. The incisions were closed with layered absorbable suture and surgical glue. FINDINGS: Ultrasound shows an anechoic and compressible jugular vein. At the completion of the study, the port  tip lies at the cavoatrial junction. TYPE OF PORT:  Smith power PAC port     Successful power-injectable venous port placement. CPT codes for physician reference only: 04099 [id number]    Nm Pet Ct Skull To Mid Thigh    Result Date: 8/9/2019  EXAM: NM PET CT SKULL TO MID THIGH LOCATION: [facility] DATE/TIME: 8/9/2019 9:35 AM INDICATION: Pulmonary nodule, right upper lobe. Central right upper lobe mass. Initial treatment strategy. COMPARISON: CT chest 07/31/2019 reviewed. TECHNIQUE: Serum glucose level 101 mg/dL. One hour post intravenous administration of 8.6 mCi F-18 FDG, PET imaging was performed from the skull base to the mid thighs utilizing attenuation correction with concurrent axial CT and PET/CT image fusion. Dose reduction techniques were used. FINDINGS: Mass in the central right upper lobe measuring approximately 4.3 x 4.7 x 5.6 cm demonstrates marked uptake (SUVmax 14.9), highly suspicious for malignancy. Mass involves the right suprahilar region and right lower paratracheal mediastinum by direct extension. Associated complete right upper lobe atelectasis. Separate FDG avid right lower paratracheal station 4 and subcarinal station 7 (SUVmax 7.2), adenopathy suspicious for arik metastases. Few prominent right anterior cardiophrenic nodes with low-level uptake are indeterminate but suspicious for additional early arik metastases. Mild areas of subpleural thickening in the posterior right chest associated with low-level but appreciable uptake, suspicious for early pleural/subpleural metastases. No pleural effusion. No suspicious focal uptake in the liver skeleton. Normal adrenal glands. Severe emphysema. Moderate atherosclerotic calcifications including the coronary arteries. 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 Marked prostate gland enlargement. Large left scrotal hydrocele. Moderate scattered changes in the spine.      CONCLUSION: Findings suspicious for central right upper lobe lung cancer with direct mediastinal extension as well as metastases involving thoracic lymph nodes including mediastinum and possibly right pericardial region, and possibly right pleura.     MEDICATIONS     Current Outpatient Medications on File Prior to Visit   Medication Sig Dispense Refill     epinephrine (EPIPEN INJ) Inject as directed as needed.       Lactobacillus rhamnosus GG (CULTURELLE) 15 billion cell CpSP Take 1 capsule by mouth 2 (two) times a day with meals.       loratadine (CLARITIN) 10 mg tablet Take 10 mg by mouth daily.       prochlorperazine (COMPAZINE) 10 MG tablet Take 1 tablet (10 mg total) by mouth every 6 (six) hours as needed (For breakthrough nausea/vomiting). 30 tablet 1     telmisartan-hydrochlorothiazide (MICARDIS HCT) 80-12.5 mg per tablet Take 1 tablet by mouth daily. 90 tablet 3     tiotropium (SPIRIVA WITH HANDIHALER) 18 mcg inhalation capsule Place 1 capsule (2 puffs total) into inhaler and inhale daily. (Patient taking differently: Place 18 mcg into inhaler and inhale daily. Possibly every other day      ) 90 capsule 3     [DISCONTINUED] cilostazol (PLETAL) 100 MG tablet Take 1 tablet (100 mg total) by mouth 2 (two) times a day. 180 tablet 3     Current Facility-Administered Medications on File Prior to Visit   Medication Dose Route Frequency Provider Last Rate Last Dose     [DISCONTINUED] sodium chloride bacteriostatic 0.9 % injection 0.1-0.3 mL  0.1-0.3 mL Subcutaneous PRN Shari Brannon, NP         [DISCONTINUED] sodium chloride flush 3 mL (NS)  3 mL Intravenous Line Care Shari Brannon, NP           HEALTH MAINTENANCE / SCREENING   PHQ-2 Total Score: 0 (7/23/2019  3:47 PM)  , No data recorded,No data recorded  Immunization History   Administered Date(s) Administered     DT (pediatric) 01/01/2003     Influenza, inj, historic,unspecified 10/07/2009, 10/12/2011, 10/05/2012, 01/20/2014     Influenza, seasonal,quad  inj 6-35 mos 12/16/2010     PPD Test 08/16/2019     Pneumo Polysac 23-V 11/10/2006     Td,adult,historic,unspecified 01/01/2003     Tdap 05/24/2012     ZOSTER, LIVE 01/22/2010     Health Maintenance   Topic     ADVANCE CARE PLANNING      MEDICARE ANNUAL WELLNESS VISIT      PNEUMOCOCCAL CONJUGATE VACCINE FOR ADULTS (PCV13 OR PREVNAR)      ZOSTER VACCINES (2 of 3)     INFLUENZA VACCINE RULE BASED (1)     FALL RISK ASSESSMENT      TD 18+ HE      PNEUMOCOCCAL POLYSACCHARIDE VACCINE AGE 65 AND OVER        Maria De Jesus Fregoso MD  Family Medicine, [facility]     This note was dictated using a voice recognition software.  Any grammatical or context distortion are unintentional and inherent to the software.

## 2021-06-01 NOTE — PROGRESS NOTES
Patient is here for one week follow-up of lung cancer. Due D8C1 treatment pending labs/OV with Dr. Lopez.  Accompanied by wife, Pat.  Laura Matos RN

## 2021-06-01 NOTE — PROGRESS NOTES
[facility] new patient note.    Name: Jared Moeller  :   1940  MRN:   [id number]  PCP:    Kaylene Sorto MD  DOS:    09/10/19      CC: Referred to ID clinic by pulmonary for positive auramine-Rhodamine stain on mediastinal lymph node     HPI/Interval History:  Jared Moeller is a 78 y.o. male with the history of smoking, BPH, and COPD.  He has been partially living in [place] for the last several years (in part because they like the cheap medical care cost).  He reports having had a physical in [place] and was found to have kidney failure as well as a new mass in his long.  He decided several months later to come back to [place] for further evaluation.  He had a repeat CT scan of the chest in late 2019 which showed a right upper lobe mass.  On 2019, the patient underwent bronchoscopy with EBUS.  Pathology confirmed metastatic lung cancer affecting multiple mediastinal lymph nodes.  Auramine-rhodamine stain came back positive on 1 left node at R4.  Patient had AFB smear negative on -.  I called the [facility] microbiology lab today 9/10/2019.  Cultures remain no growth.  He had negative QuantiFERON gold.  Patient was started on chemo and radiation therapy yesterday 2019.  He feels a little flushed today.  Has no other side effects.  He denies fever, chills, night sweats.  He has been losing some weight of about 5 pounds over the years.  He denies any hemoptysis.  No known contacts with tuberculosis.    PMH:  Past Medical History:   Diagnosis Date     BPH (benign prostatic hyperplasia)      Carotid stenosis, bilateral 2016    left 50-69%. moderate right side.     Chronic kidney disease (CKD), stage III (moderate) (H)      Colon polyps      COPD (chronic obstructive pulmonary disease) (H)      Epididymal cyst, left side. 2016     Hematuria 2002    Work-up was negative.     Hypertension      Inguinal hernia     Right     Nephritis      11 years old.     Peripheral vascular disease (H)      Polio     6 years of age. Right leg. Recovered fully.     Shingles 1996    left forehead.       PSH:  Past Surgical History:   Procedure Laterality Date     COLONOSCOPY       CYSTOSCOPY  2002     FLEXIBLE BRONCHOSCOPY  08/14/2019    with EBUS FNA>     IR EXTREMITY ANGIOGRAM BILATERAL  04/06/2016     NASAL SEPTUM SURGERY  1980s     TONSILLECTOMY  1948       Social History/Family History:  .  Ex-smoker.  No family history of recurrent infection.    Allergies:  Allergies   Allergen Reactions     Venom-Honey Bee Anaphylaxis       Medications:  Current Outpatient Medications on File Prior to Visit   Medication Sig Dispense Refill     cilostazol (PLETAL) 100 MG tablet Take 1 tablet (100 mg total) by mouth 2 (two) times a day. 180 tablet 3     Lactobacillus rhamnosus GG (CULTURELLE) 15 billion cell CpSP Take 1 capsule by mouth 2 (two) times a day with meals.       loratadine (CLARITIN) 10 mg tablet Take 10 mg by mouth daily.       prochlorperazine (COMPAZINE) 10 MG tablet Take 1 tablet (10 mg total) by mouth every 6 (six) hours as needed (For breakthrough nausea/vomiting). 30 tablet 1     telmisartan-hydrochlorothiazide (MICARDIS HCT) 80-12.5 mg per tablet Take 1 tablet by mouth daily. 90 tablet 3     tiotropium (SPIRIVA WITH HANDIHALER) 18 mcg inhalation capsule Place 1 capsule (2 puffs total) into inhaler and inhale daily. (Patient taking differently: Place 18 mcg into inhaler and inhale daily. Possibly every other day      ) 90 capsule 3     epinephrine (EPIPEN INJ) Inject as directed as needed.       Current Facility-Administered Medications on File Prior to Visit   Medication Dose Route Frequency Provider Last Rate Last Dose     [COMPLETED] CARBOplatin 110 mg in dextrose 5% 250 mL chemo (PARAPLATIN)  110 mg Intravenous Once Merlin Mendenhall, CNP   Stopped at 09/09/19 1450     [COMPLETED] dexamethasone injection 10 mg (DECADRON)  10 mg Intravenous Once  Merlin Mendenhall, CNP   10 mg at 09/09/19 1218     [COMPLETED] diphenhydrAMINE injection 25 mg (BENADRYL)  25 mg Intravenous Once Merlin Mendenhall CNP   25 mg at 09/09/19 1212     [COMPLETED] famotidine 20 mg/2 mL injection 20 mg (PEPCID)  20 mg Intravenous Once Merlin Mendenhall CNP   20 mg at 09/09/19 1215     [COMPLETED] ondansetron injection 8 mg (ZOFRAN)  8 mg Intravenous Once Merlin Mendenhall CNP   8 mg at 09/09/19 1210     [COMPLETED] PACLitaxel 50 mg/m2 = 78 mg in NaCl 0.9% (non-PVC) 175 mL (TAXOL)  50 mg/m2 (Treatment Plan Recorded) Intravenous Once Merlin Mendenhall CNP   Stopped at 09/09/19 1420     [COMPLETED] sodium chloride 0.9% 250 mL infusion   Intravenous PRN Merlin Mendenhall CNP   Stopped at 09/09/19 1500     [DISCONTINUED] heparin lockflush (PF) porcine 300-600 Units  300-600 Units Intravenous PRN Merlin Mendenhall CNP         [DISCONTINUED] sodium chloride flush 20 mL (NS)  20 mL Intravenous PRN Merlin Mendenhall CNP           Review of System:  12 points review of system is negative except for findings in the HPI.    Exam  VS:   Vitals:    09/10/19 0905   BP: 118/64   Pulse: (!) 104   Temp: 98  F (36.7  C)       Gen: Pleasant in no acute distress.  HEENT: NCAT. EOMI.  Neck: No LAD.  Lungs: Clear to auscultation bilaterally with no crackles or wheezes.   Card: RRR. No RMG. Peripheral pulses present and symmetrical. No edema.   Abd: Soft NT ND. No hepatomegaly or splenomegaly.  Ext: No edema  Lymph: No cervical or supraclavicular adenopathy.  Skin: No rash  Neuro: Alert and oriented to place time and person. Cranial nerves grossly intact.     Labs:  Reviewed.    Imaging:  CT Chest Without Contrast (Order [id number])   Imaging   Date: 7/31/2019 Department: [facility] CT Released By: Malathi Spence [facility] Authorizing: Lauri Barboza MD   Study Result     EXAM: CT CHEST WO CONTRAST  LOCATION: [facility]  DATE/TIME: 7/31/2019 9:42 AM     INDICATION: Cough lung  mass in [place] identified on CT scan of chest  COMPARISON: None.  TECHNIQUE: Helical images were obtained through the chest. Multiplanar reformats were obtained. Dose reduction techniques were used.  CONTRAST: None. Renal insufficiency.     FINDINGS:   LUNGS AND PLEURA: There appears to be a central right suprahilar mass with complete atelectasis of right upper lobe. Without contrast it is very difficult to discern central malignancy from the surrounding atelectatic lung but likely the central mass is   3 cm in size. The right upper lobe bronchus appears occluded near its origin.  An 8 mm rounded nodule is present within left lower lobe on image 113. Severe emphysema present.     MEDIASTINUM: There are a few small scattered mediastinal lymph nodes none are abnormally large. Heart size normal. Atherosclerotic calcifications of aorta and coronary arteries.     LIMITED UPPER ABDOMEN: Negative.     MUSCULOSKELETAL: No suspicious bony lesions.     IMPRESSION:   CONCLUSION:   1.  Findings consistent with obstructing central right suprahilar mass and complete atelectasis right upper lobe. This lesion would be best approached bronchoscopically.  2.  8mm rounded indeterminate nodule at left lung base.  3.  Consider PET CT evaluation for staging.  4.  Severe emphysema.            Assessment:  Jared Moeller is a 78 y.o. male with   1.  Ex-smoker.  2.  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  Started on chemotherapy and radiation therapy on 9/9/2019.  3.  Positive auramine-rhodamine stain on a single lymph node at R4.  No positive stain on the lung tissue on many other lymph nodes.  Negative QuantiFERON gold.  AFB smear on 8/17-18-19 are all negative.  Cultures no growth as of 9/10/2019 according to the microbiology lab.  I suspect this is a false positive auramine-rhodamine stain.  No treatment recommended at this point.  If cultures  come back positive then we will discuss treatment options to avoid worsening now that his immune system will be weakened from malignancy and related treatment.  With negative cultures at 3 weeks, I see no risk of pulmonary tuberculosis and as such I see no need for restricting his abilities to fly.       Recommendations:  Lung cancer treatment per oncology.  Follow-up pending AFB cultures.  Unless positive cultures, no treatment is warranted.    Discussed with the patient, and his wife.  All questions answered.      REBEKA Mckinnon  [facility]  [facility]  Office Telephone [phone].  Fax [phone]

## 2021-06-01 NOTE — PROGRESS NOTES
Art is here today for ongoing management and tx of lung cancer. Today is W3 carbo/taxol + rad tx pending labs and OV with Merlin Mendenhall NP. Elsie Simmons

## 2021-06-01 NOTE — PROGRESS NOTES
The patient attended chemotherapy class today.  The patient was educated on:  -[facility] Educational Classes and Support Groups  -Chemotherapy: what it is and how it works  -Described a typical day at the treatment center and what the patient can expect  -Discussed port access and functions of the port   -Chemotherapy side effects and appropriate management of these side effects. SE discussed included and not limited to: Fatigue, nausea and vomiting, constipation, diarrhea, mucositis, alopecia, peripheral neuropathy, pain, anemia, thrombocytopenia, and neutropenia.    -Reasons to call the physician, including, fever>100.5, shaking chills, unusual bleeding or bruising, shortness of breath, vomiting>12hours, nausea >24 hours, unable to eat/drink >24 hours, painful urination, blood in urine or stool, soreness at the IV site, and painful mouth sores.  The  triage phone number was given to the patient and the patient was encouraged to call with any questions.  The patient was given a tour of the infusion center.  All questions were addressed to the best of my abilities and the patient was encouraged to call with any questions.  Time Spent:75 min  Ruddy Dos Santos, PharmD 9/5/19 [id number]

## 2021-06-01 NOTE — PRE-PROCEDURE
Procedure Name: LEFT chest port placement   Date/Time: 9/11/2019 9:21 AM  Written consent obtained?: Yes  Risks and benefits: Risks, benefits and alternatives were discussed  Consent given by: patient  Expected level of sedation: moderate  ASA Class: Class 3- Severe systemic disease, definite functional limitations  Mallampati: Grade 1- soft palate, uvula, tonsillar pillars, and posterior pharyngeal wall visible and Grade 2- soft palate, base of uvula, tonsillar pillars, and portion of posterior pharyngeal wall visible  Patient states understanding of procedure being performed: Yes  Patient's understanding of procedure matches consent: Yes  Procedure consent matches procedure scheduled: Yes  Appropriately NPO: yes  Lungs: other (comment)  Lung exam comment: rate regular, breathing non labored, right anterior lung sounds diminished other bilateral lungs sound clear  Heart: normal heart sounds and rate  History & Physical reviewed: Full history and physical done prior to deep sedation  Statement of review: I have reviewed the lab findings, diagnostic data, medications, and the plan for sedation

## 2021-06-01 NOTE — PROGRESS NOTES
Patient is here for one week follow-up of lung cancer. Concurrent chemo/radiation, [id number] labs/OV with Dr. Lopez.  Accompanied by spouse, Kirsten. Laura Matos RN

## 2021-06-01 NOTE — PROGRESS NOTES
"CLINICAL NUTRITION SERVICES - ASSESSMENT NOTE    Jared Moeller 78 y.o. referred for MNT related to lung cancer    Time Spent: 30 minutes  Visit Type: Initial  Referring Physician: Dr. Sorto  Pt accompanied by: wife Kirsten    NUTRITION HISTORY  Factors affecting nutrition intake include: nausea (taking compazine), heartburn  Current diet: Renal  Current appetite/intake: Good  PEG Tube: No  Chemotherapy: carbo/taxol  Radiation: undergoing radiation treatment to RUL/Mediastinum (Scheduled to finish 6000 cGy/ 30 fx on 10/18/2019)        Diet Recall  Breakfast Milk with cereal or eggs or English muffin with peanut butter   Lunch Often eat out if appointments, chicken or turkey   Dinner Chicken or turkey, potatoes, fruit   Snacks Not discussed   Beverages Water, coffee   Alcohol Beer   Dining out Often when going to appointments     ANTHROPOMETRICS  Height:   Ht Readings from Last 1 Encounters:   09/11/19 5' 4\" (1.626 m)     Weight:   Wt Readings from Last 1 Encounters:   09/16/19 120 lb (54.4 kg)     BMI: 20.60 kg/m2   Weight Status:  Normal  IBW: 130 lbs  % IBW: 92%  Weight History: Weight appears relatively stable over the past month.   Wt Readings from Last 10 Encounters:   09/16/19 120 lb (54.4 kg)   09/12/19 122 lb 8 oz (55.6 kg)   09/12/19 121 lb 8 oz (55.1 kg)   09/11/19 120 lb (54.4 kg)   09/10/19 121 lb (54.9 kg)   09/09/19 123 lb 1.6 oz (55.8 kg)   09/06/19 121 lb (54.9 kg)   09/04/19 121 lb (54.9 kg)   08/26/19 121 lb 4.8 oz (55 kg)   08/20/19 121 lb 3.2 oz (55 kg)     Dosing Weight: 54 kg      Medications/vitamins/minerals/herbals:   Reviewed    Labs:   Labs reviewed  Potassium level is WNL (no need to restrict potassium intake)  No Phosphorus lab available    Physical Findings:  Thin    ASSESSED NUTRITION NEEDS:  Estimated Energy Needs: 5377-4707 kcals (30-35 kcal/kg)  Justification: Increased Needs  Estimated Protein Needs: 54-65 grams protein (1-1.2 g/kg)  Justification: Aim toward lower end for CKD stage " 3  Estimated Fluid Needs: 1662-7356 mL (30-35 mL/kg)   Justification: Maintenance    MALNUTRITION:  % Weight Loss: None noted  % Intake: Decreased intake does not meet criteria  Subcutaneous Fat Loss: None noted  Muscle Loss: Mild loss (clavicle, temple)  Fluid Retention: None    Malnutrition Diagnosis: Patient does not meet criteria for malnutrition    NUTRITION DIAGNOSIS:  Predicted inadequate nutrient intake (energy) related to need for restricted diet (renal) and potential for side effects from chemo as evidenced by anticipated intake less than estimated needs.    INTERVENTIONS  Recommendations / Nutrition Prescription  1. Recommend renal diet with intakes to meet assessed needs for weight maintenance.  Nutrition Education  Provided written & verbal education:   - Discussed ways to maximize calories and protein intake.  Advised pt to aim for at least 1800 kcal and 55-65 g protein via 5-6 small frequent meals.   - Reviewed common barriers to eating.  As treatment progresses, eating may become more difficult.  Discussed ways to cope with this.   - Reviewed ONS (Ensure/ Boost, Ensure Clear). Suggested may try these if oral intake declines. Reviewed renal supplements also (Suplena)     - Provided education on renal diet. Suggested limiting protein and sodium. Potassium level is normal, so no need for potassium restriction at this time. Suggested checking a phosphorus level. Encouraged adequate fluid intake.    Provided pt with corresponding education materials/handouts on:  Tips to Increase the Calories in Your Diet, Chronic Kidney Disease Nutrition Guidelines, Oncology: Nutrition Tips for Well-Being, Coping with Poor Appetite and Weight Loss, GERD Nutrition Therapy    Pt verbalize understanding of materials provided during consult.   Patient Understanding: Excellent  Expected Compliance: Excellent     Goals  1.  Aim for 5-6 small frequent meals  2.  Aim for 1800 kcal and 55-60 g protein/day  3. Weight maintenance  through cancer treatment      Follow-Up Plans: Pt has RD contact information for questions.      MONITORING AND EVALUATION:  -Food intake  -Fluid/beverage intake  -Liquid meal replacement or supplement  -Weight trends      Sara Clifford, MS, RD, LD

## 2021-06-01 NOTE — PROGRESS NOTES
Patient is here for follow-up of lung cancer.  Has had MR brain, seen by Nephrology, had US kidney, seen by Rad Onc.  Accompanied by spouse, Pat. Laura Matos RN

## 2021-06-01 NOTE — PATIENT INSTRUCTIONS - HE
Recent Results (from the past 24 hour(s))   Comprehensive Metabolic Panel   Result Value Ref Range    Sodium 137 136 - 145 mmol/L    Potassium 3.7 3.5 - 5.0 mmol/L    Chloride 108 (H) 98 - 107 mmol/L    CO2 21 (L) 22 - 31 mmol/L    Anion Gap, Calculation 8 5 - 18 mmol/L    Glucose 127 (H) 70 - 125 mg/dL    BUN 26 8 - 28 mg/dL    Creatinine 1.66 (H) 0.70 - 1.30 mg/dL    GFR MDRD Af Amer 49 (L) >60 mL/min/1.73m2    GFR MDRD Non Af Amer 40 (L) >60 mL/min/1.73m2    Bilirubin, Total 0.2 0.0 - 1.0 mg/dL    Calcium 9.4 8.5 - 10.5 mg/dL    Protein, Total 6.8 6.0 - 8.0 g/dL    Albumin 3.1 (L) 3.5 - 5.0 g/dL    Alkaline Phosphatase 73 45 - 120 U/L    AST 12 0 - 40 U/L    ALT 10 0 - 45 U/L   HM1 (CBC with Diff)   Result Value Ref Range    WBC 7.6 4.0 - 11.0 thou/uL    RBC 3.53 (L) 4.40 - 6.20 mill/uL    Hemoglobin 10.2 (L) 14.0 - 18.0 g/dL    Hematocrit 32.5 (L) 40.0 - 54.0 %    MCV 92 80 - 100 fL    MCH 28.9 27.0 - 34.0 pg    MCHC 31.4 (L) 32.0 - 36.0 g/dL    RDW 15.0 (H) 11.0 - 14.5 %    Platelets 274 140 - 440 thou/uL    MPV 10.3 8.5 - 12.5 fL    Neutrophils % 70 50 - 70 %    Lymphocytes % 16 (L) 20 - 40 %    Monocytes % 8 2 - 10 %    Eosinophils % 6 0 - 6 %    Basophils % 1 0 - 2 %    Neutrophils Absolute 5.3 2.0 - 7.7 thou/uL    Lymphocytes Absolute 1.2 0.8 - 4.4 thou/uL    Monocytes Absolute 0.6 0.0 - 0.9 thou/uL    Eosinophils Absolute 0.4 0.0 - 0.4 thou/uL    Basophils Absolute 0.1 0.0 - 0.2 thou/uL

## 2021-06-01 NOTE — TELEPHONE ENCOUNTER
"Patient walked in today at approx noon- (was seen at Radiation before), he had D1C1 carboplatin and paclitaxel for lung cancer.   He wanted to check in on some issues:  1) He slept after treatment yesterday. \"Is this normal?\" Assured him that with stress and pre-meds, including diphenhydramine, a nap was ok.   2) Insomnia last night: he reports hx of insomnia. He also had dexamethasone 10 mg IV yesterday. Assured him that this was not unexpected. Advised he try to limit extensive naps during day. Ok to try diphenhydramine 25 mg or melatonin 1-3 mg at HS if insomnia persistent.   3) Nausea, excessive belching this AM: He did take a compazine and problem has not recurred. Advised he did the right thing, discussed hiccups, queasy feeling as signs of nausea and to use medication as directed.   4) Facial flushing: His forehead and nose, cheeks are slightly flushed today. No eye swelling or throat tightness.  UTD shows paclitaxel can cause flushing in 28% of people. Advised gentle soaps, fragrance-free lotion.  4) Thermometer: he received a thermometer at [facility]. He was concerned that is was not working, as temp measured 97.1 at approx 6 AM. Temp measured  98.0 at radiation. Assured patient that body temps are lowest in early AM, highrer 12 hours later.   Patient was ok to continue to monitor and he is aware to call we ongoing concerns. Laura Matos RN    "

## 2021-06-01 NOTE — PROGRESS NOTES
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    Assessment and Plan  Cancer Staging  Primary cancer of right upper lobe of lung (H)  Staging form: Lung, AJCC 8th Edition  - Clinical stage from 8/20/2019: Stage IIIB (cT4, cN2, cM0) - Signed by Jada Lopez MD on 9/4/2019      ECOG Performance   ECOG Performance Status: 0    Distress Assessment  Distress Assessment Score: 2: Concerns about his medical issues.  Please see the discussion below.    Pain   : No pain.    Mr. Jared Moeller is a 78 y.o. gentleman with COPD, history of smoking which he quit in 2015.  He had some old lung nodules but was found in July 2019 to have a right upper lobe lung mass, in the right suprahilar area contiguous with the mediastinum.  The mass in the PET CT scan was 5.6 x 4.7 x 4.3 cm in size and PET positive.  This involved the right suprahilar region and a right lower paratracheal mediastinum with direct extension.  There was complete collapse of the right upper lobe with involvement of the mediastinum, possibly the right pericardial region and possibly the right pleura in the right lower lobe region.  The FNA done through the endobronchial ultrasound on 8/14/2019 showed moderately differentiated squamous cell cancer of the lung.  The mediastinal lymph nodes were also positive.    He has a chronic kidney disease stage III.  I think he has some sort of primary kidney disorder.  Likely related to the nephritis that he had as a child.  He has been referred to nephrology and was seen by Dr. Rubin of [facility] [place].  Work-up is ongoing.   Our plan is for radiation along with chemotherapy with weekly carboplatin AUC of 2 and paclitaxel at 50 mg/m .  After he is done with chemotherapy and radiation we will go with Durvalumab IV given every 2 weeks.  He  started chemotherapy and radiation on 9/9/2019.    1.  Overall it appears that the first week of chemotherapy and radiation went fairly well.  He had some mild fatigue.  He also had some mild nausea but it was well controlled with the antinausea medications.  He feels ready to proceed with week 2 of chemotherapy and radiation.  Performance status remains quite good still.  I again discussed with him about using the antinausea on an as-needed basis.    2.  Labs from today were reviewed.  Metabolic panel is overall stable with creatinine at 1.8.  This is similar to the creatinine that he has had in the past.  Rest of the metabolic panel is okay.  When we look at his blood counts, hemoglobin is come down slightly to 9.8.  WBC count and platelet count are normal.  Will proceed with week 2 of carboplatin and paclitaxel today.  Carboplatin dose has been adjusted according to the creatinine for a AUC of 2.    3.  I emphasized to the patient the need to eat well and drink plenty of fluids to preserve his kidney function.    4.  I discussed with him that the anemia is normochromic and normocytic.  We have earlier checked his iron panel and ferritin and he was not really iron deficient.  I think this is a combination of the kidney failure, cancer and chemotherapy that is causing the slight worsening of anemia.  At this point I would like him to eat well and to take a multivitamin tablet.  Down the line if anemia becomes much worse, we may even have to consider a blood transfusion.    5.  He has some heartburn symptoms due to GERD.  I am starting him on famotidine 40 mg p.o. daily.  I have given him a printed prescription.  I discussed with him that famotidine may turn out to be cheaper over-the-counter.  He should investigate to the pharmacy.    6.  Follow-up: Return to clinic with MD or NP in a week's time for week 3 of carboplatin and paclitaxel chemotherapy with labs according to treatment plan.  Meanwhile he will continue  with radiation given daily.    Time spend >25 minutes face to face with the patient. More than 50 % in counseling and coordination of care.        Problem List    1. Primary cancer of right upper lobe of lung (H)  CC OFFICE VISIT LONG    Infusion Appointment    CC OFFICE VISIT LONG    CC OFFICE VISIT LONG    Infusion Appointment   2. Encounter for antineoplastic chemotherapy     3. Normochromic normocytic anemia  multivitamin with minerals (THERA-M) 9 mg iron-400 mcg Tab tablet   4. Gastroesophageal reflux disease with esophagitis  famotidine (PEPCID) 40 MG tablet        CC: Maria De Jesus Fregoso MD Elizabeth Cameron, MD Mulugeta Rubin, DO    ______________________________________________________________________________    History of Present Illness    Mr. Jared Moeller he is here for follow-up with his wife.    He says that overall the first week of chemotherapy and radiation went well.  He did feel slightly fatigued in the first 2 days.  He says that the day after chemotherapy he felt slightly queasy and took Compazine with good effect.  After that he did feel slight nausea off and on which is well controlled with the antinausea medications.  He has not actually thrown up.    He has had some heartburn now and then.  No difficulty with swallowing.    Breathing has remained quite stable.  No chest pain.  He has an occasional cough.    No fevers.  No night sweats.  No lumps or bumps anywhere.  No unusual headaches.  No eyesight problems.  No mouth sores.  No swallowing difficulty.  No real abdominal pain.  No complaints of constipation or diarrhea.  No blood in stool or black stools.  No skin rashes.  No numbness or tingling.    Please see below.  A 14 point review of system is otherwise completely negative.    Pain Status  Currently in Pain: No/denies    Review of Systems    Constitutional  Constitutional (WDL): Exceptions to WDL  Fatigue: Fatigue relieved by rest  Fever: None  Chills: None  Weight Gain:  None  Weight Loss: None  Neurosensory  Neurosensory (WDL): All neurosensory elements are within defined limits  Cardiovascular  Cardiovascular (WDL): Exceptions to WDL(tachy)  Palpitations: None  Edema: No  Phlebitis: None  Superficial thrombophlebitis: None  Pulmonary  Respiratory (WDL): Exceptions to WDL  Cough: Mild symptoms, nonprescription intervention indicated(PND)  Dyspnea: Shortness of breath with moderate exertion  Hypoxia: None  Gastrointestinal  Gastrointestinal (WDL): Exceptions to WDL  Anorexia: None  Constipation: None  Diarrhea: None  Dysphagia: None  Esophagitis: Asymptomatic, clinical or diagnostic observations only, intervention not indicated(used Pepcid, baking soda)  Nausea: Loss of appetite without alteration in eating habits  Pharyngitis: None  Vomiting: None  Dysgeusia: Altered taste but no change in diet  Dry Mouth: None  Genitourinary  Genitourinary (WDL): All genitourinary elements are within defined limits  Integumentary  Integumentary (WDL): All integumentary elements are within defined limits  Patient Coping  Patient Coping: Open/discussion  Distress Assessment  Distress Assessment Score: 2  Accompanied by  Accompanied by: Family Member    Past History  Past Medical History:   Diagnosis Date     BPH (benign prostatic hyperplasia)      Carotid stenosis, bilateral 04/07/2016    left 50-69%. moderate right side.     Chronic kidney disease (CKD), stage III (moderate) (H)      Colon polyps      COPD (chronic obstructive pulmonary disease) (H)      Epididymal cyst, left side. 04/07/2016     Hematuria 12/2002    Work-up was negative.     Hypertension      Inguinal hernia     Right     Nephritis     11 years old.     Peripheral vascular disease (H)      Polio     6 years of age. Right leg. Recovered fully.     Shingles 1996    left forehead.         Past Surgical History:   Procedure Laterality Date     COLONOSCOPY       CYSTOSCOPY  2002     FLEXIBLE BRONCHOSCOPY  08/14/2019    with EBUS FNA>      IR EXTREMITY ANGIOGRAM BILATERAL  04/06/2016     IR PORT PLACEMENT >5 YEARS  9/11/2019     NASAL SEPTUM SURGERY  1980s     TONSILLECTOMY  1948       Physical Exam    Recent Vitals 9/16/2019   Height -   Weight 120 lbs   BSA (m2) 1.57 m2   /59   Pulse 120   Temp 97.7   Temp src 1   SpO2 97   Some recent data might be hidden       GENERAL: Alert and oriented to time place and person. Seated comfortably. In no distress.  Thin build.  Looks well overall.    HEAD: Atraumatic and normocephalic.    EYES: DANYA, EOMI.  No pallor.  No icterus.    Oral cavity: no mucosal lesion or tonsillar enlargement.    NECK: supple. JVP normal.  No thyroid enlargement.    LYMPH NODES: No palpable, cervical, axillary or inguinal lymphadenopathy.    CHEST: clear to auscultation bilaterally.  Resonant to percussion throughout bilaterally.  Symmetrical breath movements bilaterally.    Port-A-Cath over the left upper chest looks unremarkable.    CVS: S1 and S2 are heard. Regular rate and rhythm.  No murmur or gallop or rub heard.  No peripheral edema.    ABDOMEN: Soft. Not tender. Not distended.  No palpable hepatomegaly or splenomegaly.  No other mass palpable.  Bowel sounds heard.    EXTREMITIES: Warm.    SKIN: no rash, or bruising or purpura.  Has a full head of hair.        Lab Results    Recent Results (from the past 168 hour(s))   Comprehensive Metabolic Panel   Result Value Ref Range    Sodium 137 136 - 145 mmol/L    Potassium 4.2 3.5 - 5.0 mmol/L    Chloride 105 98 - 107 mmol/L    CO2 24 22 - 31 mmol/L    Anion Gap, Calculation 8 5 - 18 mmol/L    Glucose 118 70 - 125 mg/dL    BUN 39 (H) 8 - 28 mg/dL    Creatinine 1.80 (H) 0.70 - 1.30 mg/dL    GFR MDRD Af Amer 44 (L) >60 mL/min/1.73m2    GFR MDRD Non Af Amer 37 (L) >60 mL/min/1.73m2    Bilirubin, Total 0.2 0.0 - 1.0 mg/dL    Calcium 9.6 8.5 - 10.5 mg/dL    Protein, Total 6.7 6.0 - 8.0 g/dL    Albumin 3.1 (L) 3.5 - 5.0 g/dL    Alkaline Phosphatase 77 45 - 120 U/L    AST 18 0 -  40 U/L    ALT 16 0 - 45 U/L   HM1 (CBC with Diff)   Result Value Ref Range    WBC 5.4 4.0 - 11.0 thou/uL    RBC 3.34 (L) 4.40 - 6.20 mill/uL    Hemoglobin 9.8 (L) 14.0 - 18.0 g/dL    Hematocrit 30.4 (L) 40.0 - 54.0 %    MCV 91 80 - 100 fL    MCH 29.3 27.0 - 34.0 pg    MCHC 32.2 32.0 - 36.0 g/dL    RDW 14.6 (H) 11.0 - 14.5 %    Platelets 229 140 - 440 thou/uL    MPV 9.7 8.5 - 12.5 fL    Neutrophils % 71 (H) 50 - 70 %    Lymphocytes % 15 (L) 20 - 40 %    Monocytes % 7 2 - 10 %    Eosinophils % 5 0 - 6 %    Basophils % 1 0 - 2 %    Neutrophils Absolute 3.8 2.0 - 7.7 thou/uL    Lymphocytes Absolute 0.8 0.8 - 4.4 thou/uL    Monocytes Absolute 0.4 0.0 - 0.9 thou/uL    Eosinophils Absolute 0.3 0.0 - 0.4 thou/uL    Basophils Absolute 0.0 0.0 - 0.2 thou/uL       Imaging    Mr Brain With Without Contrast    Result Date: 8/29/2019  EXAM: MR BRAIN W WO CONTRAST LOCATION: [facility] DATE/TIME: 8/28/2019 12:33 PM INDICATION: Lung cancer, non-small cell, staging Staging of newly diagnosed squamous cell lung cancer. COMPARISON: PET CT 08/09/2019. CONTRAST: 5.5 mL Gadavist. TECHNIQUE: Routine multiplanar multisequence head MRI without and with intravenous contrast. FINDINGS: No abnormal parenchymal, pachymeningeal or leptomeningeal enhancement is demonstrated. There is no evidence of mass effect or midline shift. There are no findings to suggest intracranial hemorrhage. No diffusion abnormalities are demonstrated to suggest acute/subacute infarction. There is mild T2-hyperintense white matter signal abnormality. Although nonspecific, this commonly reflects chronic small vessel ischemic change. No foci of encephalomalacia are demonstrated. The sella and pituitary are unremarkable for technique. Diffuse and proportionate prominence of the ventricles, sulci and cisterns is compatible with mild generalized parenchymal atrophy. The cerebellar tonsils are appropriately positioned with a patent foramen magnum. The major  intracranial flow voids are unremarkable. There is mild mucosal thickening of the paranasal sinuses. The mastoid air cells are unremarkable in signal. The globes and orbits appear intact. The calvarium and extracranial soft tissues are also unremarkable. Limited images of the upper cervical spine demonstrate no acute abnormality.     1.  No findings to suggest intracranial metastases. 2.  No evidence of an acute intracranial abnormality. 3.  Findings compatible with mild chronic small vessel ischemic change.     Us Kidney Bilateral    Result Date: 8/30/2019  EXAM: US KIDNEY BILATERAL WITH BLADDER LOCATION: [facility] DATE/TIME: 8/30/2019 3:07 PM INDICATION: Chronic kidney disease, stage 4 (severe) COMPARISON: None. TECHNIQUE: Routine kidneys and bladder. FINDINGS: Right kidney measures 8.2 x 4.5 x 4.5 cm. The kidney is diffusely increased in echogenicity without evidence of focal lesion or hydronephrosis. Left kidney measures 7.7 x 5.2 x 3.9 cm. The kidneys diffusely increased in echogenicity without evidence of focal lesion or hydronephrosis. Bladder is normal with an enlarged prostate gland seen deep to it. The prostate gland measures 3 x 4 x 3.8 cm.     CONCLUSION: 1.  Both kidneys appear echogenic suggesting medical renal disease. There is mild bilateral renal atrophy. 2.  Enlarged prostate gland.    Ir Port Placement 5+ Years    Result Date: 9/11/2019  [facility] DATE: 9/11/2019 PROCEDURE: 1.  CENTRAL VENOUS PORT PLACEMENT 2.  FLUOROSCOPIC GUIDANCE FOR CATHETER PLACEMENT 3.  ULTRASOUND GUIDANCE FOR VASCULAR ACCESS 4.  CONSCIOUS SEDATION INTERVENTIONAL RADIOLOGIST: Mulugeta Melendrez MD. INDICATION: Lung cancer, port for chemotherapy. SEDATION: Versed 1.5 mg. Fentanyl 50 mcg. The procedure was performed with administration intravenous conscious sedation with appropriate preoperative, intraoperative, and postoperative evaluation. During the time-out, immediately prior to  administration  of medications, the patient was reassessed for adequacy to receive conscious sedation. 23 minutes of supervised face to face conscious sedation time was provided by a radiology nurse under my direct supervision. ANTIBIOTICS: None Number of images: 1 FLUOROSCOPIC TIME: 0.5 minutes CONTRAST: None. COMPLICATIONS: No immediate complications. STERILE BARRIER TECHNIQUE: Maximum sterile barrier technique was used. Cutaneous antisepsis was performed at the operative site with application of 2% chlorhexidine and large sterile drape. Prior to the procedure, the surgeon and assistant performed hand  hygiene and wore hat, mask, sterile gown, and sterile gloves during the entire procedure. PROCEDURE: Ultrasound demonstrated a patent and compressible left internal jugular vein, and an ultrasound image was obtained and placed in the patient's permanent medical record. The left neck and chest were prepped and draped in usual sterile fashion. Using real-time ultrasound guidance, the left internal jugular vein was accessed. A subcutaneous pocket was created and irrigated with sterile normal saline. The catheter was tunneled in an antegrade fashion. Over the guidewire, a peel-away sheath was advanced  with fluoroscopic monitoring. Through the peel-away sheath, the catheter was advanced until the tip was at the cavoatrial junction. The catheter was cut to length and attached firmly to the port. The incisions were closed with layered absorbable suture and surgical glue. FINDINGS: Ultrasound shows an anechoic and compressible jugular vein. At the completion of the study, the port tip lies at the cavoatrial junction. TYPE OF PORT:  Smith power PAC port     Successful power-injectable venous port placement. CPT codes for physician reference only: [id number]        Signed by: Jada Lopez MD

## 2021-06-01 NOTE — PROGRESS NOTES
[facility] Hematology and Oncology Progress Note    Patient: Jared Moeller  MRN: [id number]  Date of Service: 09/09/2019        Reason for Visit:    Dose #1 weekly carboplatin (AUC 2) + paclitaxel (50 mg/m ) chemotherapy concurrent with EBRT.      Assessment and Plan:    Cancer Staging    1. Primary cancer of right upper lobe of lung (H)    Clinical Stage IIIB (cT4, cN2, cM0)     78 y.o.     Personal history of COPD.  Quit smoking in 2015 with 46 year p/h.      07/31/19 CT chest - obstructing central right suprahilar mass and complete atelectasis right upper lobe. 8mm rounded indeterminate nodule at left lung base. Severe emphysema.    08/09/19 NM PET - 4.3 x 4.7 x 5.6 cm mass in the central right upper lobe involving the right suprahilar region and right lower paratracheal mediastinum by direct extension. Associated complete right upper lobe atelectasis. Separate FDG avid right lower paratracheal station 4 and subcarinal station 7 (SUVmax 7.2), adenopathy suspicious for arik metastases. Few prominent right anterior cardiophrenic nodes with low-level uptake are indeterminate but suspicious for additional early arik metastases. Mild areas of subpleural thickening in the posterior right chest associated with low-level but appreciable uptake. No pleural effusion. No suspicious focal uptake in the liver skeleton. Normal adrenal glands. Severe emphysema. Moderate atherosclerotic calcifications including the coronary arteries. 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 Marked prostate gland enlargement. Large left scrotal hydrocele. Moderate scattered changes in the spine.    08/14/19 FNA through the endobronchial ultrasound - showed moderately differentiated squamous cell cancer of the lung.  The mediastinal lymph nodes were also positive.    08/28/19 MR brain - No findings to suggest intracranial metastases. No evidence of an acute intracranial  "abnormality. Findings compatible with mild chronic small vessel ischemic change.    08/30/19 US kidney - Both kidneys appear echogenic suggesting medical renal disease. There is mild bilateral renal atrophy.  Enlarged prostate gland.    There was concern that he may have pulmonary tuberculosis because the auramine rhodamine stain for acid-fast bacilli was positive and the aspirate from the lower paratracheal lymph node.    Kinyoun stain for AFB came back negative.      Mantoux was negative.      QuantiFERON test was negative.      Sputum AFB x3 was negative by staining and by PCR.      Cultures were negative.      Consult with  of infectious diseases.    Chemoradiation with a small chance of cure was recommended.  Considering his kidney failure, carboplatin rather than cisplatin will be used.      09/09/19:    CBC - WBC, ANC and Plts WNL.  HgB low @ 10.2.    CMP - crt improved @ 1.66.  GFR @ 40.  Hypoalbuminemia @ 3.1.  Mildly elevated non-fasting BS.    Encouraged pushing oral fluids.    Art presents in good spirits accompanied by his wife anticipating D1 of his chemoradiation therapy.  Notes mild shortness of berath with excessive activity.  History of PAD in his legs.  Generally walks a mile/day.  Occasional Tylenol 500 mg no more than once daily for chronic neck and shoulder discomfort.    He has reviewed the printed information previously given to read about planned chemotherapy medications; specifically carboplatin, paclitaxel and Durvalumab.     Possible side effects of chemotherapy and immune mediated side effects were reviewed.      He has picked up the prescriptions for prochlorperazine anti-emetic that was sent to his pharmacy.  He states that he \"trialed\" them over the weekend and tolerated them OK.  I encouraged him to be proactive in treating any nausea.    He has completed the chemotherapy education class provide by our nurses.  Any residual questions were reviewed.    The patient has signed " the consent to treat with chemotherapy form.    Proceed with his 1st dose, weekly carboplatin (AUC 2) + paclitaxel (50 mg/m ) chemotherapy concurrent with EBRT.      If he would note a fever > 100.4F the Cancer clinic needs to be called immediately day or night as inpatient admission and antibiotics may be needed.    09/11/19 - IR port-a-cath placement sheduled.    09/16/19 - follow up week 2 carboplatin AUC of 2 and paclitaxel at 50 mg/m  with labs per Treatment Plan.    After completing chemoradiation therapy,  if no progression of disease will plan for maintenance immune mediated treatment with Durvalumab IV given every 2 weeks for 1-2 years.    NM PET 6 weeks after he completes chemoradiation therapy.     2.  Chronic kidney disease stage III.      Likely a primary kidney disorder r/t the nephritis he had as a child.      Follows with Nephrology, Dr. Rubin of [facility] [place].      Work-up is ongoing.      His kidney failure is going to be a significant factor with chemotherapy.      ECOG Performance:     ECOG Performance Status: 0    Distress Assessment:    Distress Assessment Score: 2(day 1 of chemo)    Pain:    Pain Score (Initial OR Reassessment): 1        TT > 25 minutes face to face with > 50 % in counseling and coordination of care.    Merlin Mendenhall, CNP     CC: Kaylene Sorto MD Elizabeth Cameron, MD Andrew Hipp, DO  __________________________________________________    Interim History:    Mr. Jared Moeller presents anticipating beginning planned chemoradiation therapy.  He looks and feels quite good.  He notes mild shortness of berath with excessive activity.  He has a history of PAD in his legs.  He generally walks a mile/day.  He does take occasional Tylenol, 500 mg, no more than once daily, for chronic neck and shoulder discomfort.  His appetite, weight and performance status are quite stable.  He denies concerning pain fever or chills, nausea or vomiting, constipation or  diarrhea, difficulty with urination, skin rash.    Pain Status:    Currently in Pain: Yes - mild chronic neck and shoulder discomfort managed with 500 mg Tylenol, one/day.    Review of Systems:    Constitutional  Constitutional (WDL): Exceptions to WDL  Fatigue: Fatigue relieved by rest  Fever: None  Chills: None  Weight Gain: 5 - <10% from baseline(2# 9/6/19)  Weight Loss: None  Neurosensory  Neurosensory (WDL): All neurosensory elements are within defined limits  Cardiovascular  Cardiovascular (WDL): All cardiovascular elements are within defined limits  Pulmonary  Respiratory (WDL): Within Defined Limits  Gastrointestinal  Gastrointestinal (WDL): All gastrointestinal elements are within defined limits  Genitourinary  Genitourinary (WDL): All genitourinary elements are within defined limits  Integumentary  Integumentary (WDL): All integumentary elements are within defined limits  Patient Coping  Patient Coping: Accepting;Open/discussion  Distress Assessment  Distress Assessment Score: 2(day 1 of chemo)  Accompanied by  Accompanied by: Family Member(Wife, Pat)    Past History:    Past Medical History:   Diagnosis Date     BPH (benign prostatic hyperplasia)      Carotid stenosis, bilateral 04/07/2016    left 50-69%. moderate right side.     Chronic kidney disease (CKD), stage III (moderate) (H)      Colon polyps      COPD (chronic obstructive pulmonary disease) (H)      Epididymal cyst, left side. 04/07/2016     Hematuria 12/2002    Work-up was negative.     Hypertension      Inguinal hernia     Right     Nephritis     11 years old.     Peripheral vascular disease (H)      Polio     6 years of age. Right leg. Recovered fully.     Shingles 1996    left forehead.         Past Surgical History:   Procedure Laterality Date     COLONOSCOPY       CYSTOSCOPY  2002     FLEXIBLE BRONCHOSCOPY  08/14/2019    with EBUS FNA>     IR EXTREMITY ANGIOGRAM BILATERAL  04/06/2016     NASAL SEPTUM SURGERY  1980s     TONSILLECTOMY  1948  "      Physical Exam:    Recent Vitals 9/6/2019   Height 5' 4\"   Weight 121 lbs   BSA (m2) 1.57 m2   /60   Pulse 88   Temp -   Temp src -   SpO2 95   Some recent data might be hidden       GENERAL:   Pleasant.  Alert and oriented.  Comfortable.  In no acute distress.  Thin build.      HEENT:   Atraumatic and normocephalic.  DANYA.  EOMI.  No pallor.  No icterus. No mucosal lesions.    LYMPH NODES:  No palpable cervical, axillary or inguinal lymphadenopathy.    CHEST:   Lungs quite clear to auscultation bilaterally.    CVS:    S1 and S2 aheard.  Regular rate and rhythm. No murmur, gallop or rub heard.  No peripheral edema.    ABDOMEN:   Soft. Not tender. Not distended. No palpable hepatomegaly or splenomegaly.     EXTREMITIES:  Warm.    SKIN:    No rash, bruising or purpura noted.    Lab Results:    Reviewed with patient.    Recent Results (from the past 24 hour(s))   Comprehensive Metabolic Panel   Result Value Ref Range    Sodium 137 136 - 145 mmol/L    Potassium 3.7 3.5 - 5.0 mmol/L    Chloride 108 (H) 98 - 107 mmol/L    CO2 21 (L) 22 - 31 mmol/L    Anion Gap, Calculation 8 5 - 18 mmol/L    Glucose 127 (H) 70 - 125 mg/dL    BUN 26 8 - 28 mg/dL    Creatinine 1.66 (H) 0.70 - 1.30 mg/dL    GFR MDRD Af Amer 49 (L) >60 mL/min/1.73m2    GFR MDRD Non Af Amer 40 (L) >60 mL/min/1.73m2    Bilirubin, Total 0.2 0.0 - 1.0 mg/dL    Calcium 9.4 8.5 - 10.5 mg/dL    Protein, Total 6.8 6.0 - 8.0 g/dL    Albumin 3.1 (L) 3.5 - 5.0 g/dL    Alkaline Phosphatase 73 45 - 120 U/L    AST 12 0 - 40 U/L    ALT 10 0 - 45 U/L   HM1 (CBC with Diff)   Result Value Ref Range    WBC 7.6 4.0 - 11.0 thou/uL    RBC 3.53 (L) 4.40 - 6.20 mill/uL    Hemoglobin 10.2 (L) 14.0 - 18.0 g/dL    Hematocrit 32.5 (L) 40.0 - 54.0 %    MCV 92 80 - 100 fL    MCH 28.9 27.0 - 34.0 pg    MCHC 31.4 (L) 32.0 - 36.0 g/dL    RDW 15.0 (H) 11.0 - 14.5 %    Platelets 274 140 - 440 thou/uL    MPV 10.3 8.5 - 12.5 fL    Neutrophils % 70 50 - 70 %    Lymphocytes % 16 (L) 20 " - 40 %    Monocytes % 8 2 - 10 %    Eosinophils % 6 0 - 6 %    Basophils % 1 0 - 2 %    Neutrophils Absolute 5.3 2.0 - 7.7 thou/uL    Lymphocytes Absolute 1.2 0.8 - 4.4 thou/uL    Monocytes Absolute 0.6 0.0 - 0.9 thou/uL    Eosinophils Absolute 0.4 0.0 - 0.4 thou/uL    Basophils Absolute 0.1 0.0 - 0.2 thou/uL       Imaging:    Reviewed with patient.    Mr Brain With Without Contrast    Result Date: 8/29/2019  EXAM: MR BRAIN W WO CONTRAST LOCATION: [facility] DATE/TIME: 8/28/2019 12:33 PM INDICATION: Lung cancer, non-small cell, staging Staging of newly diagnosed squamous cell lung cancer. COMPARISON: PET CT 08/09/2019. CONTRAST: 5.5 mL Gadavist. TECHNIQUE: Routine multiplanar multisequence head MRI without and with intravenous contrast. FINDINGS: No abnormal parenchymal, pachymeningeal or leptomeningeal enhancement is demonstrated. There is no evidence of mass effect or midline shift. There are no findings to suggest intracranial hemorrhage. No diffusion abnormalities are demonstrated to suggest acute/subacute infarction. There is mild T2-hyperintense white matter signal abnormality. Although nonspecific, this commonly reflects chronic small vessel ischemic change. No foci of encephalomalacia are demonstrated. The sella and pituitary are unremarkable for technique. Diffuse and proportionate prominence of the ventricles, sulci and cisterns is compatible with mild generalized parenchymal atrophy. The cerebellar tonsils are appropriately positioned with a patent foramen magnum. The major intracranial flow voids are unremarkable. There is mild mucosal thickening of the paranasal sinuses. The mastoid air cells are unremarkable in signal. The globes and orbits appear intact. The calvarium and extracranial soft tissues are also unremarkable. Limited images of the upper cervical spine demonstrate no acute abnormality.     1.  No findings to suggest intracranial metastases. 2.  No evidence of an acute intracranial  abnormality. 3.  Findings compatible with mild chronic small vessel ischemic change.     Us Kidney Bilateral    Result Date: 8/30/2019  EXAM: US KIDNEY BILATERAL WITH BLADDER LOCATION: [facility] DATE/TIME: 8/30/2019 3:07 PM INDICATION: Chronic kidney disease, stage 4 (severe) COMPARISON: None. TECHNIQUE: Routine kidneys and bladder. FINDINGS: Right kidney measures 8.2 x 4.5 x 4.5 cm. The kidney is diffusely increased in echogenicity without evidence of focal lesion or hydronephrosis. Left kidney measures 7.7 x 5.2 x 3.9 cm. The kidneys diffusely increased in echogenicity without evidence of focal lesion or hydronephrosis. Bladder is normal with an enlarged prostate gland seen deep to it. The prostate gland measures 3 x 4 x 3.8 cm.     CONCLUSION: 1.  Both kidneys appear echogenic suggesting medical renal disease. There is mild bilateral renal atrophy. 2.  Enlarged prostate gland.

## 2021-06-01 NOTE — PATIENT INSTRUCTIONS - HE
Recent Results (from the past 24 hour(s))   Comprehensive Metabolic Panel   Result Value Ref Range    Sodium 135 (L) 136 - 145 mmol/L    Potassium 4.6 3.5 - 5.0 mmol/L    Chloride 107 98 - 107 mmol/L    CO2 23 22 - 31 mmol/L    Anion Gap, Calculation 5 5 - 18 mmol/L    Glucose 113 70 - 125 mg/dL    BUN 34 (H) 8 - 28 mg/dL    Creatinine 1.63 (H) 0.70 - 1.30 mg/dL    GFR MDRD Af Amer 50 (L) >60 mL/min/1.73m2    GFR MDRD Non Af Amer 41 (L) >60 mL/min/1.73m2    Bilirubin, Total 0.3 0.0 - 1.0 mg/dL    Calcium 9.5 8.5 - 10.5 mg/dL    Protein, Total 6.7 6.0 - 8.0 g/dL    Albumin 3.2 (L) 3.5 - 5.0 g/dL    Alkaline Phosphatase 76 45 - 120 U/L    AST 18 0 - 40 U/L    ALT 19 0 - 45 U/L   HM1 (CBC with Diff)   Result Value Ref Range    WBC 4.3 4.0 - 11.0 thou/uL    RBC 3.26 (L) 4.40 - 6.20 mill/uL    Hemoglobin 9.5 (L) 14.0 - 18.0 g/dL    Hematocrit 29.6 (L) 40.0 - 54.0 %    MCV 91 80 - 100 fL    MCH 29.1 27.0 - 34.0 pg    MCHC 32.1 32.0 - 36.0 g/dL    RDW 14.9 (H) 11.0 - 14.5 %    Platelets 206 140 - 440 thou/uL    MPV 10.0 8.5 - 12.5 fL    Neutrophils % 72 (H) 50 - 70 %    Lymphocytes % 14 (L) 20 - 40 %    Monocytes % 9 2 - 10 %    Eosinophils % 4 0 - 6 %    Basophils % 1 0 - 2 %    Neutrophils Absolute 3.1 2.0 - 7.7 thou/uL    Lymphocytes Absolute 0.6 (L) 0.8 - 4.4 thou/uL    Monocytes Absolute 0.4 0.0 - 0.9 thou/uL    Eosinophils Absolute 0.2 0.0 - 0.4 thou/uL    Basophils Absolute 0.0 0.0 - 0.2 thou/uL

## 2021-06-01 NOTE — PROGRESS NOTES
At discharge the right forearm is assessed. The site remains edematous. Patient inform to monitor the arm for sensation and color changes. Yamilet DACOSTA and I observed the site skin color WNL. Patient informed to apply warm compress to the site 3 times a day for 10 to 15 minutes. Will contact patient for follow-up. Patient and wife voices no needs or concerns.

## 2021-06-01 NOTE — PROCEDURES
INTERVENTIONAL RADIOLOGY    Procedure:  Port Placement    Meds:  Versed 1.5 mg IV   Fentanyl 50 mcg IV   Ancef 2 gm IV    MD:  Parvin    Complications:  None    Contrast:  None    FT:  0.5 min    Findings:  SL power-injectable port via left IJ vein, tip at cavoatrial junction.    Plan:  OK to use Port.

## 2021-06-01 NOTE — PROGRESS NOTES
I stopped in to see Art today at his infusion.  I let him know that Mckayla, his navigator, is back from her leave and will be taking over his navigation.  I reviewed the navigator role again with him and encouraged he reach out if needed for resources.

## 2021-06-01 NOTE — PROGRESS NOTES
RADIATION ONCOLOGY WEEKLY TREATMENT VISIT NOTE      Assessment:     1. 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 Hx of COPD, CKD with creat ~2.00 and GFR 33. Scheduled to finish 6000 cGy/ 30 fx on 10/18/2019.     1. Continue radiation treatment as prescribed.  2. Will provide with spirometer.     Radiation: Site: RUL/Mediastinum  Stereotactic Radiosurgery: No  Concurrent Therapy: Yes (carbo/taxol)  Today's Dose: 800  Total Dose for Thoracic: 6000  Today's Fraction/Total Fraction Thoracic: 4/30      Subjective:      HPI: Jared Moeller is a 78 y.o. male with    1. 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    Assessment                  Body Site: Thoracic Site: RUL/Mediastinum  Stereotactic Radiosurgery: No  Concurrent Therapy: Yes(carbo/taxol)  Today's Dose: 800  Total Dose for Thoracic: 6000  Today's Fraction/Total Fraction Thoracic: 4/30  Voice Chances/Stridor/Larynx: 0: Normal  Pharynx and Esphogaus: 0: No change over baseline  Constipation: 0: None  Diarrhea W/O Colostomy: 0: None  Cough: 1: Mild, relieved by nonprescription medication  Hemoptysis: 0: None  Dyspnea: 2: Dyspnea on exertion                                              Sexuality Alteration                  Emotional Alteration Copin: Effective  Comfort Alteration KPS: 90% Can perform normal activity, minor signs of disease  Fatigue (ONS scale) : 6: Moderate Fatigue  Pain Location: new port placed  Pain Intensity. Rate degree of pain ranging from 0 (no pain) to 10 (severe pain) : 1-2  Pain Description: Dull intermittent - Dull type of ache which is intermittent(tenderness)  Pain Intervention: 1: Over the counter medications  Effectiveness of pain intervention: 4: Pain relieved 100%   Nutrition Alteration Anorexia: 1: Loss of appetite  Nausea: 1: Able to eat  Vomitin: None  Dyspepsia and/or Heartburn: 1: Mild  Pharynx and Esphogaus: 0: No change over baseline  Skin Alteration Skin Sensation: 0: No problem  Skin Reaction: 0: None  AUA Assessment                                  Accompanied by       Objective:     Exam:     Vitals:    19 1149   BP: 111/61   Pulse: (!) 113   Temp: 97.9  F (36.6  C)   TempSrc: Oral   SpO2: 99%   Weight: 121 lb 8 oz (55.1 kg)       Wt Readings from Last 8 Encounters:   19 121 lb 8 oz (55.1 kg)   19 120 lb (54.4 kg)   09/10/19 121 lb (54.9 kg)   19 123 lb 1.6 oz (55.8 kg)   19 121 lb (54.9 kg)   19 121 lb (54.9 kg)   19 121 lb 4.8 oz (55 kg)   19 121 lb 3.2 oz (55 kg)       General: Alert and oriented, in no acute distress  Jared has no Erythema.    Treatment Summary to Date    Aria chart and setup information reviewed    ILoren MD personally performed the services described in this documentation, as scribed by Kash Carreon in my presence, and it is both accurate and complete.    Signed by: Loren Denny MD, MPH

## 2021-06-01 NOTE — PROGRESS NOTES
[facility] Hematology and Oncology Progress Note    Patient: Jared Moeller  MRN: [id number]  Date of Service: 09/23/2019        Reason for Visit:    Dose #3 weekly carboplatin (AUC 2) + paclitaxel (50 mg/m ) chemotherapy concurrent with EBRT.      Assessment and Plan:    Cancer Staging    1. Primary cancer of right upper lobe of lung (H)    Clinical Stage IIIB (cT4, cN2, cM0)     78 y.o.     Personal history of COPD.  Quit smoking in 2015 with 46 year p/h.      07/31/19 CT chest - obstructing central right suprahilar mass and complete atelectasis right upper lobe. 8mm rounded indeterminate nodule at left lung base. Severe emphysema.    08/09/19 NM PET - 4.3 x 4.7 x 5.6 cm mass in the central right upper lobe involving the right suprahilar region and right lower paratracheal mediastinum by direct extension. Associated complete right upper lobe atelectasis. Separate FDG avid right lower paratracheal station 4 and subcarinal station 7 (SUVmax 7.2), adenopathy suspicious for arik metastases. Few prominent right anterior cardiophrenic nodes with low-level uptake are indeterminate but suspicious for additional early arik metastases. Mild areas of subpleural thickening in the posterior right chest associated with low-level but appreciable uptake. No pleural effusion. No suspicious focal uptake in the liver skeleton. Normal adrenal glands. Severe emphysema. Moderate atherosclerotic calcifications including the coronary arteries. 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 Marked prostate gland enlargement. Large left scrotal hydrocele. Moderate scattered changes in the spine.    08/14/19 FNA through the endobronchial ultrasound - showed moderately differentiated squamous cell cancer of the lung.  The mediastinal lymph nodes were also positive.    08/28/19 MR brain - No findings to suggest intracranial metastases. No evidence of an acute intracranial  abnormality. Findings compatible with mild chronic small vessel ischemic change.    08/30/19 US kidney - Both kidneys appear echogenic suggesting medical renal disease. There is mild bilateral renal atrophy.  Enlarged prostate gland.    There was concern that he may have pulmonary tuberculosis because the auramine rhodamine stain for acid-fast bacilli was positive and the aspirate from the lower paratracheal lymph node.    Kinyoun stain for AFB came back negative.      Mantoux was negative.      QuantiFERON test was negative.      Sputum AFB x3 was negative by staining and by PCR.      Cultures were negative.      Consult with  of infectious diseases.    Chemoradiation with a small chance of cure was recommended.  Considering his kidney failure, carboplatin rather than cisplatin will be used.      09/09/19 - began weekly carboplatin (AUC 2) + paclitaxel (50 mg/m ) chemotherapy concurrent with EBRT.      09/11/19 - IR port-a-cath placement sheduled.    09/23/19:    CBC - WBC, ANC and Plts WNL.  HgB slowly dropping @ 9.5.    CMP - crt improved @ 1.63.  GFR improved @ 41.  Slightly improved hypoalbuminemia @ 3.2.      Encouraged pushing oral fluids and added salt.    11/30 fx 2200 / 6000 cGy EBRT    Art presents in good spirits accompanied by his friend anticipating week #3 chemoradiation therapy.  He continues to note very mild shortness of breath with excessive activity.  History of PAD in his legs, tries to walk a mile/day.  Occasional Tylenol 500 mg, no more than once daily for chronic neck and shoulder discomfort.  Rare nausea and no emesis.    Possible side effects of chemotherapy and immune mediated side effects were reviewed.      Tolerating treatment well to this point.    Proceed with dose #3, weekly carboplatin (AUC 2) + paclitaxel (50 mg/m ) chemotherapy concurrent with EBRT.      If he would note a fever > 100.4F the Cancer clinic needs to be called immediately day or night as inpatient admission  and antibiotics may be needed.    09/30/19 - follow up week 4 carboplatin AUC of 2 and paclitaxel at 50 mg/m  with labs per Treatment Plan.    After completing chemoradiation therapy, if no progression of disease will plan for maintenance immune mediated treatment with Durvalumab IV given every 2 weeks for 1-2 years.    NM PET 6 weeks after he completes chemoradiation therapy.     2.  Chronic kidney disease stage III.      Likely a primary kidney disorder r/t the nephritis he had as a child.      Follows with Nephrology, Dr. Rubin of [facility] [place].      Work-up is ongoing.      His kidney failure is going to be a significant factor with chemotherapy.      ECOG Performance:     ECOG Performance Status: 1    Distress Assessment:    Distress Assessment Score: 2    Pain:    Pain Score (Initial OR Reassessment): 1        TT > 25 minutes face to face with > 50 % in counseling and coordination of care.    Merlin Mendenhall, CNP     CC: Maria De Jesus Fregoso MD Elizabeth Cameron, MD Mulugeta Rubin, DO  __________________________________________________    Interim History:    Mr. Jared Moeller presents anticipating week #3 chemoradiation therapy.  He looks and feels quite good.  He continues to note mild shortness of berath with excessive activity.  He has a history of PAD in his legs and tries to walk a mile/day.  He does take occasional Tylenol, 500 mg, no more than once daily, for chronic neck and shoulder discomfort.  His appetite, weight and performance status are quite stable.  He denies concerning pain fever or chills, nausea or vomiting, constipation or diarrhea, difficulty with urination, skin rash.    Pain Status:    Currently in Pain: Yes - mild chronic neck and shoulder discomfort managed with 500 mg Tylenol, one/day.    Review of Systems:    Constitutional  Constitutional (WDL): Exceptions to WDL  Fatigue: Fatigue relieved by rest(poor sleep)  Weight Loss: to <10% from baseline,  intervention not indicated(2 lbs)  Neurosensory  Neurosensory (WDL): All neurosensory elements are within defined limits  Cardiovascular  Cardiovascular (WDL): Exceptions to WDL  Pulmonary  Respiratory (WDL): Exceptions to WDL  Cough: Mild symptoms, nonprescription intervention indicated(PND)  Dyspnea: Shortness of breath with moderate exertion  Gastrointestinal  Gastrointestinal (WDL): Exceptions to WDL  Anorexia: Loss of appetite without alteration in eating habits  Esophagitis: Asymptomatic, clinical or diagnostic observations only, intervention not indicated(managed)  Dysgeusia: Altered taste but no change in diet  Genitourinary  Genitourinary (WDL): All genitourinary elements are within defined limits  Integumentary  Integumentary (WDL): All integumentary elements are within defined limits  Patient Coping  Patient Coping: Open/discussion  Distress Assessment  Distress Assessment Score: 2  Accompanied by  Accompanied by: Friend    Past History:    Past Medical History:   Diagnosis Date     BPH (benign prostatic hyperplasia)      Carotid stenosis, bilateral 04/07/2016    left 50-69%. moderate right side.     Chronic kidney disease (CKD), stage III (moderate) (H)      Colon polyps      COPD (chronic obstructive pulmonary disease) (H)      Epididymal cyst, left side. 04/07/2016     Hematuria 12/2002    Work-up was negative.     Hypertension      Inguinal hernia     Right     Nephritis     11 years old.     Peripheral vascular disease (H)      Polio     6 years of age. Right leg. Recovered fully.     Shingles 1996    left forehead.         Past Surgical History:   Procedure Laterality Date     COLONOSCOPY       CYSTOSCOPY  2002     FLEXIBLE BRONCHOSCOPY  08/14/2019    with EBUS FNA>     IR EXTREMITY ANGIOGRAM BILATERAL  04/06/2016     IR PORT PLACEMENT >5 YEARS  9/11/2019     NASAL SEPTUM SURGERY  1980s     TONSILLECTOMY  1948       Physical Exam:    Recent Vitals 9/23/2019   Weight 119 lbs 2 oz   BSA (m2) 1.56 m2    /68   Pulse 112   Temp 97.9   Temp src 1   SpO2 96   Some recent data might be hidden       GENERAL:   Pleasant.  Alert and oriented.  Comfortable.  In no acute distress.  Thin build.      HEENT:   Atraumatic and normocephalic.  DANYA.  EOMI.  No pallor.  No icterus. No mucosal lesions.    LYMPH NODES:  No palpable cervical, axillary or inguinal lymphadenopathy.    CHEST:   Lungs quite clear to auscultation bilaterally.    Port-a-cath in place.  No s/s infection.    CVS:    S1 and S2 aheard.  Regular rate and rhythm. No murmur, gallop or rub heard.  No peripheral edema.    ABDOMEN:   Soft.  Not tender or distended. No palpable hepatomegaly or splenomegaly.     EXTREMITIES:  Warm.    SKIN:    No rash, bruising or purpura noted.    Lab Results:    Reviewed with patient.    Recent Results (from the past 24 hour(s))   Comprehensive Metabolic Panel   Result Value Ref Range    Sodium 135 (L) 136 - 145 mmol/L    Potassium 4.6 3.5 - 5.0 mmol/L    Chloride 107 98 - 107 mmol/L    CO2 23 22 - 31 mmol/L    Anion Gap, Calculation 5 5 - 18 mmol/L    Glucose 113 70 - 125 mg/dL    BUN 34 (H) 8 - 28 mg/dL    Creatinine 1.63 (H) 0.70 - 1.30 mg/dL    GFR MDRD Af Amer 50 (L) >60 mL/min/1.73m2    GFR MDRD Non Af Amer 41 (L) >60 mL/min/1.73m2    Bilirubin, Total 0.3 0.0 - 1.0 mg/dL    Calcium 9.5 8.5 - 10.5 mg/dL    Protein, Total 6.7 6.0 - 8.0 g/dL    Albumin 3.2 (L) 3.5 - 5.0 g/dL    Alkaline Phosphatase 76 45 - 120 U/L    AST 18 0 - 40 U/L    ALT 19 0 - 45 U/L   HM1 (CBC with Diff)   Result Value Ref Range    WBC 4.3 4.0 - 11.0 thou/uL    RBC 3.26 (L) 4.40 - 6.20 mill/uL    Hemoglobin 9.5 (L) 14.0 - 18.0 g/dL    Hematocrit 29.6 (L) 40.0 - 54.0 %    MCV 91 80 - 100 fL    MCH 29.1 27.0 - 34.0 pg    MCHC 32.1 32.0 - 36.0 g/dL    RDW 14.9 (H) 11.0 - 14.5 %    Platelets 206 140 - 440 thou/uL    MPV 10.0 8.5 - 12.5 fL    Neutrophils % 72 (H) 50 - 70 %    Lymphocytes % 14 (L) 20 - 40 %    Monocytes % 9 2 - 10 %    Eosinophils % 4 0 -  6 %    Basophils % 1 0 - 2 %    Neutrophils Absolute 3.1 2.0 - 7.7 thou/uL    Lymphocytes Absolute 0.6 (L) 0.8 - 4.4 thou/uL    Monocytes Absolute 0.4 0.0 - 0.9 thou/uL    Eosinophils Absolute 0.2 0.0 - 0.4 thou/uL    Basophils Absolute 0.0 0.0 - 0.2 thou/uL         Imaging:    No new imaging.

## 2021-06-01 NOTE — PROGRESS NOTES
RADIATION ONCOLOGY WEEKLY TREATMENT VISIT NOTE      Assessment:     1. 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 Hx of COPD, CKD with creat ~2.00 and GFR 33. Scheduled to finish 6000 cGy/ 30 fx on 10/18/2019.     1. Continue radiation treatment as prescribed.  2. Lower BP today, increase fluid intake. Weight stable, no difficulty or discomfort with swallowing.     Radiation: Site: RUL/Mediastinum  Stereotactic Radiosurgery: No  Concurrent Therapy: Yes (carbo/taxol)  Today's Dose: 1800  Total Dose for Thoracic: 6000  Today's Fraction/Total Fraction Thoracic: 9/30      Subjective:      HPI: Jared Moeller is a 78 y.o. male with    1. 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    Assessment                  Body Site: Thoracic Site: RUL/Mediastinum  Stereotactic Radiosurgery: No  Concurrent Therapy: Yes(carbo/taxol)  Today's Dose: 1800  Total Dose for Thoracic: 6000  Today's Fraction/Total Fraction Thoracic: 9/30  Voice Chances/Stridor/Larynx: 0: Normal  Pharynx and Esphogaus: 0: No change over baseline  Constipation: 0: None  Diarrhea W/O Colostomy: 0: None  Cough: 1: Mild, relieved by nonprescription medication  Hemoptysis: 0: None  Dyspnea: 2: Dyspnea on exertion                                               Sexuality Alteration                 Emotional Alteration Copin: Effective  Comfort Alteration KPS: 90% Can perform normal activity, minor signs of disease  Fatigue (ONS scale) : 5: Moderate Fatigue  Pain Location: denies currently   Nutrition Alteration Anorexia: 1: Loss of appetite  Nausea: 1: Able to eat  Vomitin: None  Dyspepsia and/or Heartburn: 1: Mild(baking soda & pepcid)  Pharynx and Esphogaus: 0: No change over baseline  Skin Alteration Skin Sensation: 0: No problem  Skin Reaction: 0: None  AUA Assessment                                  Accompanied by       Objective:     Exam:     Vitals:    19 1027   BP: 104/55   Pulse: (!) 116   Temp: 97.9  F (36.6  C)   TempSrc: Oral   SpO2: 98%   Weight: 121 lb 1.6 oz (54.9 kg)       Wt Readings from Last 8 Encounters:   19 121 lb 1.6 oz (54.9 kg)   19 120 lb (54.4 kg)   19 122 lb 8 oz (55.6 kg)   19 121 lb 8 oz (55.1 kg)   19 120 lb (54.4 kg)   09/10/19 121 lb (54.9 kg)   19 123 lb 1.6 oz (55.8 kg)   19 121 lb (54.9 kg)       General: Alert and oriented, in no acute distress  Jared has no Erythema.    Treatment Summary to Date    Aria chart and setup information reviewed    I, Loren Denny MD personally performed the services described in this documentation, as scribed by Kash Carreon in my presence, and it is both accurate and complete.    Signed by: Loren Denny MD, MPH

## 2021-06-01 NOTE — PROGRESS NOTES
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    Assessment and Plan  Cancer Staging  Primary cancer of right upper lobe of lung (H)  Staging form: Lung, AJCC 8th Edition  - Clinical stage from 8/20/2019: Stage IIIB (cT4, cN2, cM0) - Signed by Jada Lopez MD on 9/4/2019      ECOG Performance   ECOG Performance Status: 1    Distress Assessment  Distress Assessment Score: 2: Concerns about his medical issues.  Please see the discussion below.    Pain   : No pain.    Mr. Jared Moeller is a 78 y.o. gentleman with COPD, history of smoking which he quit in 2015.  He had some old lung nodules but was found in July to have a right upper lobe lung mass.  This is in the right suprahilar area contiguous with the mediastinum.  The mass in the PET CT scan was 5.6 x 4.7 x 4.3 cm in size and PET positive.  This involved the right suprahilar region and a right lower paratracheal mediastinum with direct extension.  There is complete collapse of the right upper lobe.  There is involvement of the mediastinum, possibly the right pericardial region and possibly the right pleura in the right lower lobe region.  The FNA done through the endobronchial ultrasound on 8/14/2019 showed moderately differentiated squamous cell cancer of the lung.  The mediastinal lymph nodes were also positive.    1.  There was concern that he may have pulmonary tuberculosis because the auramine rhodamine stain for acid-fast bacilli was positive and the aspirate from the lower paratracheal lymph node.  However the Kinyoun stain for AFB came back negative.  In subsequent work-up the Mantoux was negative.  QuantiFERON test was negative.  Sputum AFB x3 was negative by staining and by PCR.  Nothing is growing in cultures so far.  I think he does not have pulmonary tuberculosis.  He has had an  extensive work-up.  He is already scheduled to see  of infectious diseases.  I asked him to keep that appointment so that he can clear up this issue once and for all.  He voiced understanding.    2.  He has a chronic kidney disease stage III.  I think he has some sort of primary kidney disorder.  Likely related to the nephritis that he had as a child.  He has been referred to nephrology and was seen by Dr. Rubin of [facility] [place].  Work-up is ongoing.  However I do not think we can wait for the final diagnosis.  We will have to get started with chemotherapy and radiation for the lung cancer without more delay since he already has a collapsed right upper lobe.  I discussed with him that the kidney failure is going to be a significant factor when we treat him with chemotherapy.  I will try to treat him with less kidney toxic medications but even then there is a significant risk of worsening kidney failure which will need to watch out for.  He voiced understanding.    3.  He has seen Dr. Denny of radiation oncology.  He is planned for chemoradiation.  Radiation examination has been done and will likely get started next week.  I discussed with him that to get the best chance of a good outcome he will need to be treated with chemotherapy along with radiation.  For a stage IIIb lung cancer, there is a small chance of cure when treated with chemotherapy and radiation.  I think we should try our best for that cure.  I discussed with him that in the last year the standards of treatment has changed and after chemotherapy and radiation is completed we continue treatment with durvalumab which is an immune mediated treatment given IV every 2 weeks.  Usually will continue with maintenance durvalumab for 1 or 2 years after people are done with chemotherapy and radiation if they do not have any progression of disease.  With the addition of Durvalumab we are seeing much better responses to treatment.   However at this time that treatment is fairly new and we do not know yet the chance of overall survival to give a definite percentage.  Certainly outcomes appear better than treated with chemotherapy and radiation alone.  It is this sequence that I am planning for him.  Considering the kidney failure issues that he has, I do not recommend that he should be treated with cisplatin.  I recommend we treat him with weekly carboplatin AUC of 2 and paclitaxel at 50 mg/m .  After he is done with chemotherapy and radiation we will go with Durvalumab IV given every 2 weeks.  I discussed with him that even with using carboplatin we have to be still very careful about his kidney function since even carboplatin can sometimes cause kidney.  He voiced understanding.    I explained to him that the typical day of chemotherapy.  It will be given once a week starting on the day of radiation and then repeated through the weeks when he is receiving radiation until radiation is completed.  Radiation is given daily Monday to Friday.  Once he is done with chemotherapy and radiation then we will go to durvalumab alone for maintenance.  After he completed chemotherapy and radiation about 6 weeks later we will repeat a PET CT scan to see how much response he has had.  If he has had a good response we will monitor with subsequent scans down the line.  He voiced understanding.    I first discussed with him the possible side effects of chemotherapy in detail.  After that I discussed with him about the possible immune mediated side effects which are very unpredictable and can affect virtually any organ system in the body.  I have given him printed information to read about the carboplatin and and paclitaxel combination and about a Durvalumab.    I discussed with Jared Moeller about the risks of chemotherapy.  We discussed about the reduction in blood counts. Anemia can cause tiredness, a low platelet count can lead to bleeding problems. A  blood transfusion may be needed for anemia or low platelet count. A low white blood count puts a person at risk for infections which may even be life threatening. If there is a fever of 100.5 F or above the Cancer clinic needs to be called immediately day or night as inpatient admission and antibiotics may be needed.    Chemotherapy may cause body aches. It may cause hair loss which can even be permanent. When hair grows back after chemo it may have a different color or texture.  Chemotherapy may cause problems with oral ulcers or pain on swallowing. It may cause nausea and vomiting but and we use antinausea medications to control the nausea.  Chemotherapy may cause problems for the heart with decreased function.  This is not common with this particular chemotherapy regimen.  It may cause diarrhea or constipation.  Chemotherapy may cause toxicity to the liver or kidneys.      Chemotherapy may cause neuropathy causing numbness and tingling in fingers and toes and unsteadiness on walking. Chemotherapy doses may need to be modified for this problem.  Occasionally chemotherapy may cause problems for the brain. Some people experience problems with memory. Rarely there can be serious brain damage.    Occasionally chemotherapy can cause damage to the bone marrow after some time with reduced blood counts- called myelodysplasia. Rarely chemotherapy can cause a second cancer years later, like a leukemia.    Thus chemotherapy can have serious and even life threatening side effects. Some people can have side effects that are different from what we discussed as people can react differently to chemotherapy- what we have discussed is not an exhaustive list. However chemotherapy is being recommended as the expected benefits outweigh the risks.    After the detailed discussion Jared Moeller signed the consent form for chemotherapy.    5.  Chemotherapy orders have been initiated.  I have sent prescriptions for the prochlorperazine  to his pharmacy.  I explained to him how to take the antinausea medications.    6.  I had the chemotherapy nurses to evaluate his peripheral veins.  They do recommend that he have a Port-A-Cath placed.  I have requested for this thru IR.  I think it may be we will be able to give him the first dose of chemotherapy through the peripheral IV but beyond that he will certainly need the Port-A-Cath.    7.  He will be scheduled to have chemotherapy education with the nurses before he starts chemotherapy.    8.  Return to clinic with MD or NP on the first day of radiation to start chemotherapy.  Labs according to treatment plan.    Time spend >45 minutes face to face with the patient. More than 50 % in counseling and coordination of care.        Problem List    1. Panlobular emphysema (H)  tiotropium (SPIRIVA WITH HANDIHALER) 18 mcg inhalation capsule   2. Peripheral vascular disease (H)  cilostazol (PLETAL) 100 MG tablet   3. Essential hypertension  telmisartan-hydrochlorothiazide (MICARDIS HCT) 80-12.5 mg per tablet   4. Primary cancer of right upper lobe of lung (H)  IR Port Placement 5+ Years    prochlorperazine (COMPAZINE) 10 MG tablet    Ambulatory referral to Nutrition Services    Education (Chemo Class)    Oncology Staff Appointment    CC OFFICE VISIT LONG    Infusion Appointment   5. Chronic kidney disease (CKD), stage III (moderate) (H)          CC: Kaylene Sorto MD Elizabeth Cameron, MD Mulugeta Rubin, DO    ______________________________________________________________________________    History of Present Illness    Mr. Jared Moeller is here for follow-up with his wife.  He is coming back to see me after having several tests and evaluations done.  He has seen Dr. Denny of radiation oncology and is planned to get started with radiation for the lung cancer next week.    He says that breathing is fairly stable.  He cannot do most things.  He has a dry cough.  Does not bring up any phlegm.   Occasionally he has some chest discomfort behind his sternum.  This does not appear to be anginal chest pain.    He has not noticed any lumps or bumps anywhere.  No new aches or pains.  Weight has been stable.  Eating well.  Swallowing is not a problem.  No nausea vomiting or abdominal pain.  No constipation or diarrhea.  No blood in stool or black stools.  No difficulty with urination.  No skin rashes.    Please see below.  A 14 point review of system is otherwise completely negative.    Pain Status  Currently in Pain: No/denies    Review of Systems    Constitutional  Constitutional (WDL): All constitutional elements are within defined limits  Neurosensory  Neurosensory (WDL): Exceptions to WDL  Peripheral Motor Neuropathy: None  Ataxia: None  Peripheral Sensory Neuropathy: Asymptomatic, loss of deep tendon reflexes or paresthesia(chronic Lt foot bunions)  Confusion: None  Syncope: None  Cardiovascular  Cardiovascular (WDL): Exceptions to WDL(tachy)  Palpitations: None  Edema: No  Phlebitis: None  Superficial thrombophlebitis: None  Pulmonary  Respiratory (WDL): Exceptions to WDL  Cough: Mild symptoms, nonprescription intervention indicated(dry, some PND)  Dyspnea: Shortness of breath with moderate exertion  Hypoxia: None  Gastrointestinal  Gastrointestinal (WDL): Exceptions to WDL  Anorexia: None  Constipation: None  Diarrhea: Increase of <4 stools per day over baseline, mild increase in ostomy output compared to baseline(up to 3 times daily, depends on diet and liquids)  Dysphagia: None  Esophagitis: None  Nausea: None  Pharyngitis: None  Vomiting: None  Dysgeusia: None  Dry Mouth: None  Genitourinary  Genitourinary (WDL): All genitourinary elements are within defined limits(nocturia 2-3 times)  Urinary Frequency: None  Urinary Retention: None  Urinary Tract Pain: None  Integumentary  Integumentary (WDL): All integumentary elements are within defined limits(? ;ipoma Lt upper posterior arm)  Patient  Coping  Patient Coping: Open/discussion  Distress Assessment  Distress Assessment Score: 2  Accompanied by  Accompanied by: Family Member(wife Kirsten)    Past History  Past Medical History:   Diagnosis Date     BPH (benign prostatic hyperplasia)      Carotid stenosis, bilateral 04/07/2016    left 50-69%. moderate right side.     Chronic kidney disease (CKD), stage III (moderate) (H)      Colon polyps      COPD (chronic obstructive pulmonary disease) (H)      Epididymal cyst, left side. 04/07/2016     Hematuria 12/2002    Work-up was negative.     Hypertension      Inguinal hernia     Right     Nephritis     11 years old.     Peripheral vascular disease (H)      Polio     6 years of age. Right leg. Recovered fully.     Shingles 1996    left forehead.         Past Surgical History:   Procedure Laterality Date     COLONOSCOPY       CYSTOSCOPY  2002     FLEXIBLE BRONCHOSCOPY  08/14/2019    with EBUS FNA>     IR EXTREMITY ANGIOGRAM BILATERAL  04/06/2016     NASAL SEPTUM SURGERY  1980s     TONSILLECTOMY  1948       Physical Exam    Recent Vitals 9/4/2019   Height -   Weight 121 lbs   BSA (m2) 1.57 m2   /73   Pulse 109   Temp 97.6   Temp src 1   SpO2 96   Some recent data might be hidden       GENERAL: Alert and oriented to time place and person. Seated comfortably. In no distress.  Thin build.  Slightly anxious.    HEAD: Atraumatic and normocephalic.    EYES: DANYA, EOMI.  No pallor.  No icterus.    Oral cavity: no mucosal lesion or tonsillar enlargement.    NECK: supple. JVP normal.  No thyroid enlargement.    LYMPH NODES: No palpable, cervical, axillary or inguinal lymphadenopathy.    CHEST: clear to auscultation bilaterally.  Resonant to percussion throughout bilaterally.  Breath movements appear slightly better on the left side.    CVS: S1 and S2 are heard. Regular rate and rhythm.  No murmur or gallop or rub heard.  No peripheral edema.    ABDOMEN: Soft. Not tender. Not distended.  No palpable hepatomegaly or  splenomegaly.  No other mass palpable.  Bowel sounds heard.    EXTREMITIES: Warm.    SKIN: no rash, or bruising or purpura.  Has a full head of hair.      Lab Results    No results found for this or any previous visit (from the past 168 hour(s)).    Imaging    Mr Brain With Without Contrast    Result Date: 8/29/2019  EXAM: MR BRAIN W WO CONTRAST LOCATION: [facility] DATE/TIME: 8/28/2019 12:33 PM INDICATION: Lung cancer, non-small cell, staging Staging of newly diagnosed squamous cell lung cancer. COMPARISON: PET CT 08/09/2019. CONTRAST: 5.5 mL Gadavist. TECHNIQUE: Routine multiplanar multisequence head MRI without and with intravenous contrast. FINDINGS: No abnormal parenchymal, pachymeningeal or leptomeningeal enhancement is demonstrated. There is no evidence of mass effect or midline shift. There are no findings to suggest intracranial hemorrhage. No diffusion abnormalities are demonstrated to suggest acute/subacute infarction. There is mild T2-hyperintense white matter signal abnormality. Although nonspecific, this commonly reflects chronic small vessel ischemic change. No foci of encephalomalacia are demonstrated. The sella and pituitary are unremarkable for technique. Diffuse and proportionate prominence of the ventricles, sulci and cisterns is compatible with mild generalized parenchymal atrophy. The cerebellar tonsils are appropriately positioned with a patent foramen magnum. The major intracranial flow voids are unremarkable. There is mild mucosal thickening of the paranasal sinuses. The mastoid air cells are unremarkable in signal. The globes and orbits appear intact. The calvarium and extracranial soft tissues are also unremarkable. Limited images of the upper cervical spine demonstrate no acute abnormality.     1.  No findings to suggest intracranial metastases. 2.  No evidence of an acute intracranial abnormality. 3.  Findings compatible with mild chronic small vessel ischemic change.     Us  Kidney Bilateral    Result Date: 8/30/2019  EXAM: US KIDNEY BILATERAL WITH BLADDER LOCATION: [facility] DATE/TIME: 8/30/2019 3:07 PM INDICATION: Chronic kidney disease, stage 4 (severe) COMPARISON: None. TECHNIQUE: Routine kidneys and bladder. FINDINGS: Right kidney measures 8.2 x 4.5 x 4.5 cm. The kidney is diffusely increased in echogenicity without evidence of focal lesion or hydronephrosis. Left kidney measures 7.7 x 5.2 x 3.9 cm. The kidneys diffusely increased in echogenicity without evidence of focal lesion or hydronephrosis. Bladder is normal with an enlarged prostate gland seen deep to it. The prostate gland measures 3 x 4 x 3.8 cm.     CONCLUSION: 1.  Both kidneys appear echogenic suggesting medical renal disease. There is mild bilateral renal atrophy. 2.  Enlarged prostate gland.    Nm Pet Ct Skull To Mid Thigh    Result Date: 8/9/2019  EXAM: NM PET CT SKULL TO MID THIGH LOCATION: [facility] DATE/TIME: 8/9/2019 9:35 AM INDICATION: Pulmonary nodule, right upper lobe. Central right upper lobe mass. Initial treatment strategy. COMPARISON: CT chest 07/31/2019 reviewed. TECHNIQUE: Serum glucose level 101 mg/dL. One hour post intravenous administration of 8.6 mCi F-18 FDG, PET imaging was performed from the skull base to the mid thighs utilizing attenuation correction with concurrent axial CT and PET/CT image fusion. Dose reduction techniques were used. FINDINGS: Mass in the central right upper lobe measuring approximately 4.3 x 4.7 x 5.6 cm demonstrates marked uptake (SUVmax 14.9), highly suspicious for malignancy. Mass involves the right suprahilar region and right lower paratracheal mediastinum by direct extension. Associated complete right upper lobe atelectasis. Separate FDG avid right lower paratracheal station 4 and subcarinal station 7 (SUVmax 7.2), adenopathy suspicious for arik metastases. Few prominent right anterior cardiophrenic nodes with low-level uptake are indeterminate but  suspicious for additional early arik metastases. Mild areas of subpleural thickening in the posterior right chest associated with low-level but appreciable uptake, suspicious for early pleural/subpleural metastases. No pleural effusion. No suspicious focal uptake in the liver skeleton. Normal adrenal glands. Severe emphysema. Moderate atherosclerotic calcifications including the coronary arteries. 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 Marked prostate gland enlargement. Large left scrotal hydrocele. Moderate scattered changes in the spine.     CONCLUSION: Findings suspicious for central right upper lobe lung cancer with direct mediastinal extension as well as metastases involving thoracic lymph nodes including mediastinum and possibly right pericardial region, and possibly right pleura.         Signed by: Jada Lopez MD

## 2021-06-01 NOTE — PROGRESS NOTES
Patient here for daily treatment and being seen for OTV.  Patient has post-nasal drip today and a sore throat because of the drip.  Gave patient salt/baking soda water rinse recipe with instructions to use.  Instructed patient to switch to softer foods while throat is sore.  Patient able to verbalize understanding.

## 2021-06-01 NOTE — PROGRESS NOTES
RADIATION ONCOLOGY WEEKLY TREATMENT VISIT NOTE      Assessment:     1. 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 Hx of COPD, CKD with creat ~2.00 and GFR 33. Scheduled to finish 6000 cGy/ 30 fx on 10/18/2019.      1. Continue radiation treatment as prescribed.  2. Patient with some clear post nasal drainage and cough for the last 4-5 days. Now beginning to have sore throat and some difficulty swallowing. Not in radiation field, possibly due to allergies or cold symptoms. Recommended Allegra or similar OTC medications.  3. Patient has pepcid for reflux prn.  4. Diet modification discussed.  5. Eye itchiness, OTC eye drops prn.     Radiation: Site: RUL/Mediastinum  Stereotactic Radiosurgery: No  Concurrent Therapy: Yes (carbo/taxol)  Today's Dose: 2800  Total Dose for Thoracic: 6000  Today's Fraction/Total Fraction Thoracic: 14/30      Subjective:      HPI: Jared Moeller is a 78 y.o. male with    1. 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    Assessment                  Body Site: Thoracic Site: RUL/Mediastinum  Stereotactic Radiosurgery: No  Concurrent Therapy: Yes(carbo/taxol)  Today's Dose: 2800  Total Dose for Thoracic: 6000  Today's Fraction/Total Fraction Thoracic: 14/30  Voice  Chances/Stridor/Larynx: 0: Normal  Pharynx and Esphogaus: 2: Tolerates soft diet(gave salt/baking soda water recipe, switch to softer foods.)  Constipation: 0: None  Diarrhea W/O Colostomy: 0: None  Cough: 1: Mild, relieved by nonprescription medication  Hemoptysis: 0: None  Dyspnea: 2: Dyspnea on exertion                                              Sexuality Alteration                 Emotional Alteration Copin: Effective  Comfort Alteration KPS: 90% Can perform normal activity, minor signs of disease  Fatigue (ONS scale) : 3: Mild Fatigue  Pain Location: sore throat(given salt/baking soda water rinse recipe)  Pain Intensity. Rate degree of pain ranging from 0 (no pain) to 10 (severe pain) : 8  Pain Description: Combination - A combination of pain descriptions (note)(sore throat, feels like throat tightens, restrictive)  Pain Intervention: 1: Over the counter medications  Effectiveness of pain intervention: 1: Pain relieved 25%   Nutrition Alteration Anorexia: 1: Loss of appetite  Nausea: 1: Able to eat  Vomitin: None  Dyspepsia and/or Heartburn: 1: Mild  Pharynx and Esphogaus: 2: Tolerates soft diet(gave salt/baking soda water recipe, switch to softer foods.)  Skin Alteration Skin Sensation: 0: No problem  Skin Reaction: 0: None  AUA Assessment                                  Accompanied by       Objective:     Exam:     Vitals:    19 1037   BP: 157/68   Pulse: (!) 107   Temp: 97.8  F (36.6  C)   TempSrc: Oral   SpO2: 100%   Weight: 120 lb 1.6 oz (54.5 kg)       Wt Readings from Last 8 Encounters:   19 120 lb 1.6 oz (54.5 kg)   19 119 lb 1.6 oz (54 kg)   19 121 lb 1.6 oz (54.9 kg)   19 120 lb (54.4 kg)   19 122 lb 8 oz (55.6 kg)   19 121 lb 8 oz (55.1 kg)   19 120 lb (54.4 kg)   09/10/19 121 lb (54.9 kg)       General: Alert and oriented, in no acute distress  Jared has no Erythema.  Posterior oropharynx appears injected, mild erythema, no  pustules     Treatment Summary to Date    [facility] chart and setup information reviewed    I, Loren Denny MD personally performed the services described in this documentation, as scribed by Kash Carreon in my presence, and it is both accurate and complete.    Signed by: Loren Denny MD, MPH

## 2021-06-02 NOTE — PROGRESS NOTES
RADIATION ONCOLOGY WEEKLY TREATMENT VISIT NOTE      Assessment:     1. 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 Hx of COPD, CKD with creat ~2.00 and GFR 33. Scheduled to finish 6000 cGy/ 30 fx on 10/18/2019.    1. Continue radiation treatment as prescribed.    Radiation: Site: RUL/Mediastinum  Stereotactic Radiosurgery: No  Concurrent Therapy: Yes (carbo/taxol)  Today's Dose: 4800  Total Dose for Thoracic: 6000  Today's Fraction/Total Fraction Thoracic:       Subjective:      HPI: Jared Moeller is a 78 y.o. male with    1. 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    Assessment                  Body Site: Thoracic Site: RUL/Mediastinum  Stereotactic Radiosurgery: No  Concurrent Therapy: Yes(carbo/taxol)  Today's Dose: 4800  Total Dose for Thoracic: 6000  Today's Fraction/Total Fraction Thoracic:   Voice Chances/Stridor/Larynx: 0: Normal  Pharynx and Esphogaus: 2: Tolerates soft diet  Constipation: 0: None  Diarrhea W/O Colostomy: 0: None  Cough: 1: Mild, relieved by nonprescription medication  Hemoptysis: 0: None  Dyspnea: 2: Dyspnea on exertion                                              Sexuality Alteration                 Emotional Alteration Copin:  Effective  Comfort Alteration KPS: 90% Can perform normal activity, minor signs of disease  Fatigue (ONS scale) : 3: Mild Fatigue  Pain Location: sore throat  Pain Intensity. Rate degree of pain ranging from 0 (no pain) to 10 (severe pain) : 0-5  Pain Description: Combination - A combination of pain descriptions (note)(feels tight in throat)  Pain Intervention: 1: Over the counter medications  Effectiveness of pain intervention: 3: Pain relieved 75%   Nutrition Alteration Anorexia: 0: None  Nausea: 0: None  Vomitin: None  Dyspepsia and/or Heartburn: 1: Mild  Pharynx and Esphogaus: 2: Tolerates soft diet  Skin Alteration Skin Sensation: 0: No problem  Skin Reaction: 0: None  AUA Assessment                                  Accompanied by       Objective:     Exam:     Vitals:    10/10/19 1021   BP: 137/61   Pulse: (!) 114   Temp: 97.7  F (36.5  C)   TempSrc: Oral   SpO2: 99%   Weight: 120 lb 11.2 oz (54.7 kg)       Wt Readings from Last 8 Encounters:   10/10/19 120 lb 11.2 oz (54.7 kg)   10/07/19 118 lb 9.6 oz (53.8 kg)   10/03/19 121 lb (54.9 kg)   19 119 lb 3.2 oz (54.1 kg)   19 120 lb 1.6 oz (54.5 kg)   19 119 lb 1.6 oz (54 kg)   19 121 lb 1.6 oz (54.9 kg)   19 120 lb (54.4 kg)       General: Alert and oriented, in no acute distress  Jared has mild Erythema.  Breath sounds clear throughout.   No edema.     Treatment Summary to Date    Aria chart and setup information reviewed    Bismark Guidry MD

## 2021-06-02 NOTE — PROGRESS NOTES
[facility] Hematology and Oncology Progress Note    Patient: Jared Moeller  MRN: [id number]  Date of Service: 10/07/2019        Reason for Visit:    Dose #5 weekly carboplatin (AUC 2) + paclitaxel (50 mg/m ) chemotherapy concurrent with EBRT.      Assessment and Plan:    Cancer Staging    1. Primary cancer of right upper lobe of lung (H)    Clinical Stage IIIB (cT4, cN2, cM0)     78 y.o.     Personal history of COPD.  Quit smoking in 2015 with 46 year p/h.      07/31/19 CT chest - obstructing central right suprahilar mass and complete atelectasis right upper lobe. 8mm rounded indeterminate nodule at left lung base. Severe emphysema.    08/09/19 NM PET - 4.3 x 4.7 x 5.6 cm mass in the central right upper lobe involving the right suprahilar region and right lower paratracheal mediastinum by direct extension. Associated complete right upper lobe atelectasis. Separate FDG avid right lower paratracheal station 4 and subcarinal station 7 (SUVmax 7.2), adenopathy suspicious for arik metastases. Few prominent right anterior cardiophrenic nodes with low-level uptake are indeterminate but suspicious for additional early arik metastases. Mild areas of subpleural thickening in the posterior right chest associated with low-level but appreciable uptake. No pleural effusion. No suspicious focal uptake in the liver skeleton. Normal adrenal glands. Severe emphysema. Moderate atherosclerotic calcifications including the coronary arteries. 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 Marked prostate gland enlargement. Large left scrotal hydrocele. Moderate scattered changes in the spine.    08/14/19 FNA through the endobronchial ultrasound - showed moderately differentiated squamous cell cancer of the lung.  The mediastinal lymph nodes were also positive.    08/28/19 MR brain - No findings to suggest intracranial metastases. No evidence of an acute intracranial  abnormality. Findings compatible with mild chronic small vessel ischemic change.    08/30/19 US kidney - Both kidneys appear echogenic suggesting medical renal disease. There is mild bilateral renal atrophy.  Enlarged prostate gland.    There was concern that he may have pulmonary tuberculosis because the auramine rhodamine stain for acid-fast bacilli was positive and the aspirate from the lower paratracheal lymph node.    Kinyoun stain for AFB came back negative.      Mantoux was negative.      QuantiFERON test was negative.      Sputum AFB x3 was negative by staining and by PCR.      Cultures were negative.      Consult with  of infectious diseases.    Chemoradiation with a small chance of cure was recommended.  Considering his kidney failure, carboplatin rather than cisplatin will be used.      09/09/19 - began weekly carboplatin (AUC 2) + paclitaxel (50 mg/m ) chemotherapy concurrent with EBRT.      09/11/19 - IR port-a-cath placement sheduled.    10/07/19:    22/30 fx 4400 / 6000 cGy EBRT    HEME1 - WBC 3.3.  ANC and Plts WNL.  HgB slowly dropping @ 9.1.    Found to be mildly iron deficient at his nephrology appointment - started on SR-iron, one daily - tolerating OK.    CMP - crt improved @ 1.53.  GFR improved @ 44.  Stable hypoalbuminemia @ 3.2.      Encouraged pushing oral fluids.    Encouraged 1 Boost protein or CIB supplement/day.    Art presents in good spirits accompanied by his wife anticipating week #5 chemoradiation therapy.  He continues to note slightly progressive fatigue and mild shortness of breath with excessive activity.  History of PAD in his legs - used to walk a mile/day, now limited to the walking required for shopping.  Occasional Tylenol PM for sleep.  Rare nausea and no emesis.  Only used 3 antiemetics thus far.  Some dysphagia due to radiation esophagitis - does not feel needs intervention.  Has Rx for Magic mouthwash.    Potential/likely side effects of chemotherapy and immune  mediated side effects were reviewed.      Tolerating treatment acceptably well to this point.    Proceed with dose #5, weekly carboplatin (AUC 2) + paclitaxel (50 mg/m ) chemotherapy concurrent with EBRT.      If he would note a fever > 100.4F the Cancer clinic needs to be called immediately day or night as inpatient admission and antibiotics may be needed.    Hoping to vacation for a couple of weeks after completion of chemotherapy and radiation.  He is no longer planning to return [place] for a few months as they have cleared out their apartment and not extended their lease.    Received the annual flu vaccine last week.  planning to receive the pneumonia and Shingrix vaccines proposed by his primary doctor after completing current regimen.    10/14/19 - follow up week 6 carboplatin AUC of 2 and paclitaxel at 50 mg/m  with labs per Treatment Plan.    After completing chemoradiation therapy, if no progression of disease will plan for maintenance immune mediated treatment with Durvalumab IV given every 2 weeks for 1-2 years.    NM PET 6 weeks after he completes chemoradiation therapy.     2.  Chronic kidney disease stage III.      Likely a primary kidney disorder r/t the nephritis he had as a child.      Follows with Nephrology, Dr. Rubin of [facility] [place].      Work-up is ongoing.      His kidney failure is going to be a significant factor with chemotherapy.     Next follow up after 01/01/2000.     ECOG Performance:     ECOG Performance Status: 1    Distress Assessment:    Distress Assessment Score: 2    Pain:    Pain Score (Initial OR Reassessment): 0        TT > 25 minutes face to face with > 50 % in counseling and coordination of care.    Merlin Mendenhall, CNP     CC: Maria De Jesus Fregoso MD Elizabeth Cameron, MD Andrew Hipp, DO  __________________________________________________    Interim History:    Mr. Jared Moeller presents anticipating week #5 chemoradiation therapy.  He looks  "and feels quite good.  He continues to note mild shortness of berath with excessive activity.  Mild treatment related esophagitis, sometimes affecting his food choices.  He has a history of PAD in his legs.  He used to walk a mile/day, now limited to shopping walking.  He does take occasional Tylenol, 500 mg, generally a PM at night for sleep.  No concerning chronic neck and shoulder discomfort since bought an ergonomic pillow.  His appetite and weight are quite stable.  He denies concerning pain fever or chills, nausea or vomiting, constipation or diarrhea, difficulty with urination, skin rash.    Pain Status:    Currently in Pain: No/denies     Review of Systems:    Constitutional  Constitutional (WDL): Exceptions to WDL  Fatigue: Fatigue relieved by rest  Weight Loss: to <10% from baseline, intervention not indicated  Neurosensory  Neurosensory (WDL): All neurosensory elements are within defined limits  Cardiovascular  Cardiovascular (WDL): Exceptions to WDL(tachy)  Pulmonary  Respiratory (WDL): Exceptions to WDL  Cough: Mild symptoms, nonprescription intervention indicated(\"congestion\")  Dyspnea: Shortness of breath with moderate exertion  Gastrointestinal  Gastrointestinal (WDL): Exceptions to WDL  Diarrhea: Increase of <4 stools per day over baseline, mild increase in ostomy output compared to baseline(this morning.)  Dysphagia: Symptomatic, able to eat regular diet  Dysgeusia: Altered taste but no change in diet  Genitourinary  Genitourinary (WDL): All genitourinary elements are within defined limits  Integumentary  Integumentary (WDL): Exceptions to WDL  Alopecia: Hair loss of up to 50% of normal for that individual that is not obvious from a distance but only on close inspection, a different hair style may be required to cover the hair loss but it does not require a wig or hair piece to camouflage  Patient Coping  Patient Coping: Open/discussion  Distress Assessment  Distress Assessment Score: " 2  Accompanied by  Accompanied by: Family Member    Past History:    Past Medical History:   Diagnosis Date     BPH (benign prostatic hyperplasia)      Carotid stenosis, bilateral 04/07/2016    left 50-69%. moderate right side.     Chronic kidney disease (CKD), stage III (moderate) (H)      Colon polyps      COPD (chronic obstructive pulmonary disease) (H)      Epididymal cyst, left side. 04/07/2016     Hematuria 12/2002    Work-up was negative.     Hypertension      Inguinal hernia     Right     Nephritis     11 years old.     Peripheral vascular disease (H)      Polio     6 years of age. Right leg. Recovered fully.     Shingles 1996    left forehead.         Past Surgical History:   Procedure Laterality Date     COLONOSCOPY       CYSTOSCOPY  2002     FLEXIBLE BRONCHOSCOPY  08/14/2019    with EBUS FNA>     IR EXTREMITY ANGIOGRAM BILATERAL  04/06/2016     IR PORT PLACEMENT >5 YEARS  9/11/2019     NASAL SEPTUM SURGERY  1980s     TONSILLECTOMY  1948       Physical Exam:    Recent Vitals 10/3/2019   Weight 121 lbs   BSA (m2) 1.57 m2   /67   Pulse 108   Temp 97.5   Temp src 1   SpO2 99   Some recent data might be hidden       GENERAL:   Pleasant.  Alert and oriented.  Comfortable.  In no acute distress.  Thin build.  accompanied by spouse.    HEENT:   Atraumatic and normocephalic.  DANYA.  EOMI.  No pallor.  No icterus. No mucosal lesions.    LYMPH NODES:  No palpable cervical, axillary or inguinal lymphadenopathy.    CHEST:   Lungs quite clear to auscultation bilaterally.    Port-a-cath in place.  No s/s infection.    CVS:    S1 and S2 aheard.  Regular rate and rhythm. No murmur, gallop or rub heard.  No peripheral edema.    ABDOMEN:   Soft.  Not tender or distended. No palpable hepatomegaly or splenomegaly.     EXTREMITIES:  Warm.    SKIN:    No rash, bruising or purpura noted.    Lab Results:    Reviewed with patient.    Recent Results (from the past 24 hour(s))   Comprehensive Metabolic Panel   Result Value  Ref Range    Sodium 138 136 - 145 mmol/L    Potassium 4.5 3.5 - 5.0 mmol/L    Chloride 106 98 - 107 mmol/L    CO2 21 (L) 22 - 31 mmol/L    Anion Gap, Calculation 11 5 - 18 mmol/L    Glucose 142 (H) 70 - 125 mg/dL    BUN 30 (H) 8 - 28 mg/dL    Creatinine 1.53 (H) 0.70 - 1.30 mg/dL    GFR MDRD Af Amer 54 (L) >60 mL/min/1.73m2    GFR MDRD Non Af Amer 44 (L) >60 mL/min/1.73m2    Bilirubin, Total 0.3 0.0 - 1.0 mg/dL    Calcium 9.3 8.5 - 10.5 mg/dL    Protein, Total 6.4 6.0 - 8.0 g/dL    Albumin 3.2 (L) 3.5 - 5.0 g/dL    Alkaline Phosphatase 64 45 - 120 U/L    AST 12 0 - 40 U/L    ALT 10 0 - 45 U/L   HM1 (CBC with Diff)   Result Value Ref Range    WBC 3.3 (L) 4.0 - 11.0 thou/uL    RBC 3.08 (L) 4.40 - 6.20 mill/uL    Hemoglobin 9.1 (L) 14.0 - 18.0 g/dL    Hematocrit 28.3 (L) 40.0 - 54.0 %    MCV 92 80 - 100 fL    MCH 29.5 27.0 - 34.0 pg    MCHC 32.2 32.0 - 36.0 g/dL    RDW 16.1 (H) 11.0 - 14.5 %    Platelets 146 140 - 440 thou/uL    MPV 9.9 8.5 - 12.5 fL    Neutrophils % 74 (H) 50 - 70 %    Lymphocytes % 13 (L) 20 - 40 %    Monocytes % 9 2 - 10 %    Eosinophils % 3 0 - 6 %    Basophils % 1 0 - 2 %    Neutrophils Absolute 2.4 2.0 - 7.7 thou/uL    Lymphocytes Absolute 0.4 (L) 0.8 - 4.4 thou/uL    Monocytes Absolute 0.3 0.0 - 0.9 thou/uL    Eosinophils Absolute 0.1 0.0 - 0.4 thou/uL    Basophils Absolute 0.0 0.0 - 0.2 thou/uL       Imaging:    No new imaging.

## 2021-06-02 NOTE — PROGRESS NOTES
"  RADIATION ONCOLOGY WEEKLY TREATMENT VISIT NOTE      Assessment:     1. 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   Histopy of COPD, CKD with serum creatinine improving at 1.50 and GFR 55. Scheduled to finish 6000 cGy/ 30 fx on 10/18/2019.    1. Continue radiation treatment as prescribed.  It will be completed after one more session.  2. Follow up here in 4-6 weeks after completion of radiation therapy.  3. Activity modification PRN fatigue.   4. Today he will acquire the \"magic mouthwash\" already prescribed by Dr Lopez, and he will report the status of his throat pain when he visits Dr Lopez on 10/27/19.    Radiation: Site: RUL/Mediastinum  Stereotactic Radiosurgery: No  Concurrent Therapy: Yes (carbo/taxol)  Today's Dose: 5800  Total Dose for Thoracic: 6000  Today's Fraction/Total Fraction Thoracic: 29/30      Subjective:      Radiation Treatment Summary    HISTORY: Jared Moeller is a 78 y.o. male who was treated with radiation therapy for moderately differentiated invasive squamous cell carcinoma involving and collapsing the RUL, 4.3 x 4.7 x 5.6 cm, with biopsy confirmed mediastinal LN, station R4 LN, and station R10 LN involvement.     The patient originally learned of a pulmonary mass from on x-ray approximately 3-4 years ago while he was living in [place] and seeing pulmonology, at that time he was also diagnosed with COPD. Follow up CT scan was performed and he was told this was benign appearing with further follow up " recommended. He was lost to follow up for a few years until more recently a CT scan showed the RUL mass enlarging and very suspicious for lung cancer. He has returned from [place] for further follow up, during his time there he was also diagnosed with CKD with creatinines ranging from 1.5-1.9 over the past year.     CT chest w/o contrast on 7/31/2019 showed an obstructing central right suprahilar mass and complete atelectasis of the RUL, approximately 3 cm in size. Also noted was an 8mm rounded indeterminate nodule at LLL. PET CT on 8/9/2019 returned with findings suspicious for central RUL cancer, measuring 4.3 x 4.7 x 5.6 cm, with direct mediastinal extension as well as mets involving the thoracic LNs including mediastinum and possible right pericardial region and possible right pleura (FDG avid right lower paratracheal station 4 and subcarinal station 7 suspicious for arik mets). MRI head 8/28/2019 negative for intracranial metastases.      Endobronchial US guided FNA was performed on 8/14/2019, pathology returned positive for metastatic poorly differentiated squamous cell carcinoma in the sampled mediastinal LN, station R4 LN, station R10 LN, and bronchial washing and bronchus take off of the RUL. The subcarinal LN did not return with any tumor. 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      Regarding his COPD, PFT on 7/31/2019 was abnormal. Spirometry is consistent with mild obstructive ventilatory defect. Spirometry is not consistent with reversibility. There is no hyperinflation. There is air-trapping. Diffusion capacity when corrected for hemoglobin is moderately reduced.    SITE TREATED: RUL  TOTAL DOSE: 6000  NUMBER OF FRACTIONS: 30  DATES COMPLETED: 10/18/2019  CONCURRENT CHEMOTHERAPY: Yes  ADJUVANT THERAPY:Yes    He tolerated the treatment without unexpected  side effects.       The following portions of the patient's history were reviewed and updated as appropriate: allergies, current medications, past family history, past medical history, past social history, past surgical history and problem list.    Assessment                  Body Site: Thoracic Site: RUL/Mediastinum  Stereotactic Radiosurgery: No  Concurrent Therapy: Yes(carbo/taxol)  Today's Dose: 5800  Total Dose for Thoracic: 6000  Today's Fraction/Total Fraction Thoracic:   Voice Chances/Stridor/Larynx: 1: Mild or intermittent hoarseness  Pharynx and Esphogaus: 2: Tolerates soft diet(pills much more difficult)  Constipation: 0: None  Diarrhea W/O Colostomy: 0: None  Cough: 1: Mild, relieved by nonprescription medication  Hemoptysis: 0: None  Dyspnea: 2: Dyspnea on exertion                               Sexuality Alteration                 Emotional Alteration Copin: Effective  Comfort Alteration KPS: 90% Can perform normal activity, minor signs of disease  Fatigue (ONS scale) : 3: Mild Fatigue  Pain Location: sore throat  Pain Intensity. Rate degree of pain ranging from 0 (no pain) to 10 (severe pain) : 0-5  Pain Description: Combination - A combination of pain descriptions (note)(tight in throat right at back of throat)  Pain Intervention: 1: Over the counter medications  Effectiveness of pain intervention: 4: Pain relieved 100%   Nutrition Alteration Anorexia: 1: Loss of appetite  Nausea: 1: Able to eat  Vomitin: None  Dyspepsia and/or Heartburn: 1: Mild  Pharynx and Esphogaus: 2: Tolerates soft diet(pills much more difficult)  The throat pain when swallowing prevents him from taking large pills.          Skin Alteration Skin Sensation: 0: No problem  Skin Reaction: 0: None                                   Accompanied by: wife       Objective:     Exam:     Vitals:    10/17/19 1030   BP: 118/59   Pulse: (!) 113   Temp: 97.7  F (36.5  C)   TempSrc: Oral   SpO2: 100%   Weight: 121 lb 9.6 oz (55.2  kg)       Wt Readings from Last 8 Encounters:   10/17/19 121 lb 9.6 oz (55.2 kg)   10/14/19 121 lb 8 oz (55.1 kg)   10/10/19 120 lb 11.2 oz (54.7 kg)   10/07/19 118 lb 9.6 oz (53.8 kg)   10/03/19 121 lb (54.9 kg)   09/30/19 119 lb 3.2 oz (54.1 kg)   09/26/19 120 lb 1.6 oz (54.5 kg)   09/23/19 119 lb 1.6 oz (54 kg)       General: Alert and oriented, in no acute distress  Jared has no erythema on neck or chest.  Lung sounds present in all fields, including right upper chest. No rhonchi or wheezing.   I see two white patches on the posterior oropharynx  This is not radiation mucositis because his neck was not irradiated..  He probably has candidiasis as the cause of dysphagia that he developed in recent weeks.    Treatment Summary to Date  See above    Aria chart and setup information reviewed    Dave Dwyer MD

## 2021-06-02 NOTE — PROGRESS NOTES
Patient is here for one week follow-up of lung cancer. Finished radiation on 10/18/19.  Accompanied by wife, Pat.   Laura Matos RN

## 2021-06-02 NOTE — PROGRESS NOTES
RADIATION ONCOLOGY WEEKLY TREATMENT VISIT NOTE      Assessment:     1. 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 Hx of COPD, CKD with creat ~2.00 and GFR 33. Scheduled to finish 6000 cGy/ 30 fx on 10/18/2019.     1. Continue radiation treatment as prescribed.  2. Some lower esophageal discomfort and slow food/pill transit. Has been given magic mouthwash by Dr. Lopez as he feels this may be referred from lung however patient hasn't picked this prescription up. Weight is up, keeping adequate caloric intake.   3. Elevated blood pressure today 138/67 with , will watch BPs. Patient to see nephrology today and hopefully will get recommendations.   Radiation: Site: RUL/Mediastinum  Stereotactic Radiosurgery: No  Concurrent Therapy: Yes  Today's Dose: 3800  Total Dose for Thoracic: 6000  Today's Fraction/Total Fraction Thoracic: 19/30      Subjective:      HPI: Jared Moeller is a 78 y.o. male with    1. 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    Assessment                  Body Site: Thoracic Site: RUL/Mediastinum  Stereotactic Radiosurgery: No  Concurrent Therapy: Yes  Today's Dose: 3800  Total Dose for Thoracic: 6000  Today's Fraction/Total Fraction Thoracic: 19/30  Voice  Chances/Stridor/Larynx: 0: Normal  Pharynx and Esphogaus: 2: Tolerates soft diet  Constipation: 0: None  Diarrhea W/O Colostomy: 0: None  Cough: 1: Mild, relieved by nonprescription medication  Hemoptysis: 0: None  Dyspnea: 2: Dyspnea on exertion                                              Sexuality Alteration                 Emotional Alteration Copin: Effective  Comfort Alteration KPS: 90% Can perform normal activity, minor signs of disease  Fatigue (ONS scale) : 3: Mild Fatigue  Pain Location: Sore throat  Pain Intensity. Rate degree of pain ranging from 0 (no pain) to 10 (severe pain) : 2-3  Pain Description: Combination - A combination of pain descriptions (note)(sore throat with eating, but pt reports improved.)   Nutrition Alteration Anorexia: 0: None(Pt reports improved.)  Nausea: 0: None  Vomitin: None  Dyspepsia and/or Heartburn: 1: Mild  Pharynx and Esphogaus: 2: Tolerates soft diet  Skin Alteration Skin Sensation: 0: No problem  Skin Reaction: 0: None  AUA Assessment                                  Accompanied by       Objective:     Exam:     Vitals:    10/03/19 1026   BP: 138/67   Pulse: (!) 108   Temp: 97.5  F (36.4  C)   TempSrc: Oral   SpO2: 99%   Weight: 121 lb (54.9 kg)       Wt Readings from Last 8 Encounters:   10/03/19 121 lb (54.9 kg)   19 119 lb 3.2 oz (54.1 kg)   19 120 lb 1.6 oz (54.5 kg)   19 119 lb 1.6 oz (54 kg)   19 121 lb 1.6 oz (54.9 kg)   19 120 lb (54.4 kg)   19 122 lb 8 oz (55.6 kg)   19 121 lb 8 oz (55.1 kg)       General: Alert and oriented, in no acute distress  Jared has no Erythema.    Treatment Summary to Date    Aria chart and setup information reviewed    Loren PATEL MD personally performed the services described in this documentation, as scribed by Kash Carreon in my presence, and it is both accurate and complete.    Signed by: Loren Denny MD, MPH

## 2021-06-02 NOTE — TELEPHONE ENCOUNTER
Patient had questions regarding his magic mouthwash Rx from Dr. Lopez.  States he could not pick it up yesterday.  Placed call to [facility] and they stated they can fill the Rx for the patient but the patients insurance does not cover [facility] and the patient would have to pay out of pocket for the medication.  Per [facility] out of pocket cost to patient would be $45.    Placed call to our pharmacy liaison, Monica,  and she states that a new Rx could be sent to the [facility] pharmacy to be filled and sent out.    Called the [facility] pharmacy and they said they could get the medication to the patient by Tuesday 10/22/2019 next week but they would not know until they ran the prescription if this would be covered by the patient's insurance or if they would have to pay full cost.  Called patient back to give him the above information and asked where he would like the medication sent.  Talked with Pat, patients wife, as patient was driving his car.  They are in the process of moving and they do not want to do anything more to get this Rx right now.  Patient feels he is doing well enough to go without it now that he is done with treatment.  He has a follow up with medical oncology on Monday 10/21/19 and can let them know at that time if they have any further needs.  Instructed Pat to call the clinic number over the weekend if they have any concerns about the patients swallowing.  Kirsten verbalized understanding and has call back number.

## 2021-06-02 NOTE — PROGRESS NOTES
[facility] Progress Note    Patient: Jared Moeller  MRN: [id number]  Date of Service: 10/14/2019        Reason for Visit:    Dose #6 weekly carboplatin (AUC 2) + paclitaxel (50 mg/m ) chemotherapy concurrent with EBRT.      Assessment and Plan:    Cancer Staging    1. Primary cancer of right upper lobe of lung (H)    Clinical Stage IIIB (cT4, cN2, cM0)     78 y.o.     Personal history of COPD.  Quit smoking in 2015 with 46 year p/h.      07/31/19 CT chest - obstructing central right suprahilar mass and complete atelectasis right upper lobe. 8mm rounded indeterminate nodule at left lung base. Severe emphysema.    08/09/19 NM PET - 4.3 x 4.7 x 5.6 cm mass in the central right upper lobe involving the right suprahilar region and right lower paratracheal mediastinum by direct extension. Associated complete right upper lobe atelectasis. Separate FDG avid right lower paratracheal station 4 and subcarinal station 7 (SUVmax 7.2), adenopathy suspicious for arik metastases. Few prominent right anterior cardiophrenic nodes with low-level uptake are indeterminate but suspicious for additional early arik metastases. Mild areas of subpleural thickening in the posterior right chest associated with low-level but appreciable uptake. No pleural effusion. No suspicious focal uptake in the liver skeleton. Normal adrenal glands. Severe emphysema. Moderate atherosclerotic calcifications including the coronary arteries. 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 Marked prostate gland enlargement. Large left scrotal hydrocele. Moderate scattered changes in the spine.    08/14/19 FNA through the endobronchial ultrasound - showed moderately differentiated squamous cell cancer of the lung.  The mediastinal lymph nodes were also positive.    08/28/19 MR brain - No findings to suggest intracranial metastases. No evidence of an acute intracranial  abnormality. Findings compatible with mild chronic small vessel ischemic change.    08/30/19 US kidney - Both kidneys appear echogenic suggesting medical renal disease. There is mild bilateral renal atrophy.  Enlarged prostate gland.    There was concern that he may have pulmonary tuberculosis because the auramine rhodamine stain for acid-fast bacilli was positive and the aspirate from the lower paratracheal lymph node.    Kinyoun stain for AFB came back negative.      Mantoux was negative.      QuantiFERON test was negative.      Sputum AFB x3 was negative by staining and by PCR.      Cultures were negative.      Consult with  of infectious diseases.    Chemoradiation with a small chance of cure was recommended.  Considering his kidney failure, carboplatin rather than cisplatin will be used.      09/09/19 - began weekly carboplatin (AUC 2) + paclitaxel (50 mg/m ) chemotherapy concurrent with EBRT.      09/11/19 - IR port-a-cath placement sheduled.    10/14/19:    26/30 fx 5200 / 6000 cGy EBRT    HEME1 - WBC 2.4.  ANC 1.6.  Plts 138K.  HgB slowly dropping @ 8.6.    Neutropenic precautions reviewed.    Found to be mildly iron deficient at his nephrology appointment - started on SR-iron, one daily - tolerating OK.    CMP - crt improved @ 1.5.  GFR improved @ 45.  Stable hypoalbuminemia @ 3.2.      Encouraged pushing oral fluids.    Encouraged 1 Boost protein or CIB supplement/day.    Art presents alone, in good spirits anticipating dose #6 of his final week chemoradiation therapy.  He continues to note slightly progressive fatigue and mild shortness of breath with excessive activity.  History of PAD in his legs - used to walk a mile/day, now limited to the walking required for shopping.  Occasional Tylenol PM for sleep.  Rare nausea and no emesis.  Only used 3 antiemetics thus far.  Some dysphagia due to radiation esophagitis - does not feel needs intervention.  Has Rx for Magic mouthwash.    Potential/likely  side effects of chemotherapy and immune mediated side effects were reviewed.      Tolerating treatment acceptably well to this point.    Proceed with dose #6, weekly carboplatin (AUC 2) + paclitaxel (50 mg/m ) chemotherapy concurrent with EBRT.      If he would note a fever > 100.4F the Cancer clinic needs to be called immediately day or night as inpatient admission and antibiotics may be needed.    Hoping to vacation for a couple of weeks after completion of chemotherapy and radiation.  He is no longer planning to return [place] for a few months as they have cleared out their apartment and not extended their lease.    Received the annual flu vaccine.  Planning to receive the pneumonia and Shingrix vaccines proposed by his primary doctor after completing current regimen.    Jo patient education handout on Durvalumab maintenance therapy given to patient.    10/21/19 - follow up with HEME1 and CMP post completing carboplatin + paclitaxel radiosensitizing chemotherapy concurrent with EBRT.    After completing chemoradiation therapy, if no progression of disease will plan for maintenance immune mediated treatment with Durvalumab IV given every 2 weeks for 1-2 years.    NM PET 6 weeks after he completes chemoradiation therapy.     2.  Chronic kidney disease stage III.      Likely a primary kidney disorder r/t the nephritis he had as a child.      Follows with Nephrology, Dr. Rubin of [facility] [place].      Very stable.    Work-up is ongoing.      Next follow up after 01/01/2000.     ECOG Performance:     ECOG Performance Status: 0    Distress Assessment:    Distress Assessment Score: No distress    Pain:             TT > 25 minutes face to face with > 50 % in counseling and coordination of care.    Merlin Mendenhall, CNP     CC: Maria De Jesus Fregoso MD Elizabeth Cameron, MD Andrew Hipp, DO  __________________________________________________    Interim History:    Mr. Jared Moeller presents  anticipating week #6, his final week chemoradiation therapy.  He looks and feels quite good.  He continues to note mild shortness of berath with excessive activity.  Mild treatment related esophagitis, affects his food choices.  His appetite and weight are quite stable.  He has a history of PAD in his legs.  He used to walk a mile/day, now limited to shopping walking.  He does take occasional Tylenol, 500 mg, generally a PM at night for sleep.  No concerning chronic neck and shoulder discomfort since bought an ergonomic pillow.  He denies concerning pain, fever or chills, nausea or vomiting, constipation or diarrhea, difficulty with urination, skin rash.    Pain Status:    Currently in Pain: No/denies     Review of Systems:    Constitutional  Constitutional (WDL): Exceptions to WDL  Fatigue: Concerns(last week was worse; improving this week. Taking tylenol PM)  Neurosensory  Neurosensory (WDL): All neurosensory elements are within defined limits  Eye   Eye Disorder (WDL): All eye disorder elements are within defined limits  Ear  Ear Disorder (WDL): All ear disorder elements are within defined limits  Cardiovascular  Cardiovascular (WDL): All cardiovascular elements are within defined limits  Pulmonary  Respiratory (WDL): Exceptions to WDL  Cough: Concerns(post nasal drainage)  Dyspnea: Concerns(with activity; COPD)  Gastrointestinal  Gastrointestinal (WDL): Exceptions to WDL  Dysgeusia: Concerns(bland tasting)  Esophagitis: Concerns(having to chew food to almost nothing otherwise has hard time swallowing. Cannot take big gulps of liquids)  Pharyngitis: Concerns(several weeks; possible sinus drainge)  Genitourinary  Genitourinary (WDL): All genitourinary elements are within defined limits  Lymphatic  Lymph (WDL): All lymph disorder elements are within defined limits  Musculoskeletal and Connective Tissue  Musculoskeletal and Connetive Tissue Disorders (WDL): All Musculoskeletal and Connetive Tissue Disorder elements  are within defined limits  Integumentary  Integumentary (WDL): All integumentary elements are within defined limits  Patient Coping     Accompanied by  Accompanied by: Alone  Oral Chemo Adherence       Distress Assessment  Distress Assessment Score: No distress    Past History:    Past Medical History:   Diagnosis Date     BPH (benign prostatic hyperplasia)      Carotid stenosis, bilateral 04/07/2016    left 50-69%. moderate right side.     Chronic kidney disease (CKD), stage III (moderate) (H)      Colon polyps      COPD (chronic obstructive pulmonary disease) (H)      Epididymal cyst, left side. 04/07/2016     Hematuria 12/2002    Work-up was negative.     Hypertension      Inguinal hernia     Right     Nephritis     11 years old.     Peripheral vascular disease (H)      Polio     6 years of age. Right leg. Recovered fully.     Shingles 1996    left forehead.         Past Surgical History:   Procedure Laterality Date     COLONOSCOPY       CYSTOSCOPY  2002     FLEXIBLE BRONCHOSCOPY  08/14/2019    with EBUS FNA>     IR EXTREMITY ANGIOGRAM BILATERAL  04/06/2016     IR PORT PLACEMENT >5 YEARS  9/11/2019     NASAL SEPTUM SURGERY  1980s     TONSILLECTOMY  1948       Physical Exam:    Recent Vitals 10/14/2019   Weight 121 lbs 8 oz   BSA (m2) 1.58 m2   /64   Pulse 104   Temp 97.6   Temp src 1   SpO2 97   Some recent data might be hidden       GENERAL:   Pleasant.  Alert and oriented.  Comfortable.  In no acute distress.  Thin build.      HEENT:   Atraumatic and normocephalic.  DANYA.  EOMI.  No pallor.  No icterus. No mucosal lesions.    LYMPH NODES:  No palpable cervical, axillary or inguinal lymphadenopathy.    CHEST:   Lungs quite clear to auscultation bilaterally.    Port-a-cath in place.  No s/s infection.    CVS:    S1 and S2 aheard.  Regular rate and rhythm. No murmur, gallop or rub heard.  No peripheral edema.    ABDOMEN:   Soft.  Not tender.  Not distended. No palpable hepatomegaly or splenomegaly.      EXTREMITIES:  Warm.    SKIN:    No rash, bruising or purpura noted.    Lab Results:    Reviewed with patient.    Recent Results (from the past 24 hour(s))   Comprehensive Metabolic Panel   Result Value Ref Range    Sodium 137 136 - 145 mmol/L    Potassium 4.6 3.5 - 5.0 mmol/L    Chloride 108 (H) 98 - 107 mmol/L    CO2 23 22 - 31 mmol/L    Anion Gap, Calculation 6 5 - 18 mmol/L    Glucose 100 70 - 125 mg/dL    BUN 27 8 - 28 mg/dL    Creatinine 1.50 (H) 0.70 - 1.30 mg/dL    GFR MDRD Af Amer 55 (L) >60 mL/min/1.73m2    GFR MDRD Non Af Amer 45 (L) >60 mL/min/1.73m2    Bilirubin, Total 0.2 0.0 - 1.0 mg/dL    Calcium 9.2 8.5 - 10.5 mg/dL    Protein, Total 6.1 6.0 - 8.0 g/dL    Albumin 3.2 (L) 3.5 - 5.0 g/dL    Alkaline Phosphatase 69 45 - 120 U/L    AST 30 0 - 40 U/L    ALT 36 0 - 45 U/L   HM1 (CBC with Diff)   Result Value Ref Range    WBC 2.4 (L) 4.0 - 11.0 thou/uL    RBC 2.86 (L) 4.40 - 6.20 mill/uL    Hemoglobin 8.6 (L) 14.0 - 18.0 g/dL    Hematocrit 26.5 (L) 40.0 - 54.0 %    MCV 93 80 - 100 fL    MCH 30.1 27.0 - 34.0 pg    MCHC 32.5 32.0 - 36.0 g/dL    RDW 17.1 (H) 11.0 - 14.5 %    Platelets 138 (L) 140 - 440 thou/uL    MPV 9.8 8.5 - 12.5 fL    Neutrophils % 65 50 - 70 %    Lymphocytes % 15 (L) 20 - 40 %    Monocytes % 13 (H) 2 - 10 %    Eosinophils % 4 0 - 6 %    Basophils % 0 0 - 2 %    Neutrophils Absolute 1.6 (L) 2.0 - 7.7 thou/uL    Lymphocytes Absolute 0.4 (L) 0.8 - 4.4 thou/uL    Monocytes Absolute 0.3 0.0 - 0.9 thou/uL    Eosinophils Absolute 0.1 0.0 - 0.4 thou/uL    Basophils Absolute 0.0 0.0 - 0.2 thou/uL       Imaging:    No new imaging.

## 2021-06-02 NOTE — PROGRESS NOTES
Art is here today for ongoing management and tx of lung cancer. Today is D1W5 carbo/taxol and rad tx pending labs and OV with Merlin Mendenhall NP. Elsie Simmons

## 2021-06-02 NOTE — TELEPHONE ENCOUNTER
Called patient for routine follow up call s/p radiation for his lung cancer.  Left message for patient to call with questions or concerns and to call scheduling to make follow up appointment with Dr. Denny.  Call back number left for patient.

## 2021-06-02 NOTE — PROGRESS NOTES
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    Assessment and Plan  Cancer Staging  Primary cancer of right upper lobe of lung (H)  Staging form: Lung, AJCC 8th Edition  - Clinical stage from 8/20/2019: Stage IIIB (cT4, cN2, cM0) - Signed by Jada Lopez MD on 9/4/2019      ECOG Performance   ECOG Performance Status: 0    Distress Assessment  Distress Assessment Score: No distress: Concerns about his medical issues.  Please see the discussion below.    Pain   : No pain.    Mr. Jared Moeller is a 78 y.o. gentleman with COPD, history of smoking which he quit in 2015.  He had some old lung nodules but was found in July 2019 to have a right upper lobe lung mass, in the right suprahilar area contiguous with the mediastinum.  The mass in the PET CT scan was 5.6 x 4.7 x 4.3 cm in size and PET positive.  This involved the right suprahilar region and a right lower paratracheal mediastinum with direct extension.  There was complete collapse of the right upper lobe with involvement of the mediastinum, possibly the right pericardial region and possibly the right pleura in the right lower lobe region.  The FNA done through the endobronchial ultrasound on 8/14/2019 showed moderately differentiated squamous cell cancer of the lung.  The mediastinal lymph nodes were also positive.    He has a chronic kidney disease stage III.  I think he has some sort of primary kidney disorder.  Likely related to the nephritis that he had as a child.  He has been referred to nephrology and was seen by Dr. Rubin of [facility] [place].  Work-up is ongoing.   Our plan is for radiation along with chemotherapy with weekly carboplatin AUC of 2 and paclitaxel at 50 mg/m .  After he is done with chemotherapy and radiation we will go with Durvalumab IV given every 2  weeks.  He started chemotherapy and radiation on 9/9/2019.  He received the last day of chemotherapy on 10/14/2019 and received the last day of radiation on 10/18/2019.    1.  Overall I think he has tolerated chemotherapy and radiation fairly well.  He feels that his breathing is okay.  Still has a cough.  He has some pain on swallowing.  I think he has some amount of radiation esophagitis.  Blood counts from today were reviewed.  Hemoglobin is 8.4.  White count is 2.1 with an ANC of 1300.  Platelet count is normal.  Metabolic panel shows that his creatinine has settled down around 1.68.  LFTs are okay.    2.  For the radiation esophagitis I am sending the prescription for Magic mouthwash to the [place] [facility].  I asked him to obtain the medication and start using it.  I think this pain will subside in a couple of weeks.  He voiced understanding.     3.  He is concerned about his antihypertensive medications which were taken off and his blood pressures were running low.  I think the blood pressures are starting to recover.  I told him that we will continue to monitor his blood pressures and also start coming back up we will reinstitute the blood pressure medications one by one.  He voiced understanding.    4.  I discussed with him that we will obtain a PET CT scan, 6 weeks from now.  That is when we will have a good idea about how much improvement we have made with the chemotherapy and radiation.  I have ordered a PET CT scan and asked for it to be scheduled.    5.  I then discussed with him about maintenance durvalumab treatment.  I showed him the original study with the progression free survival graph.  I discussed with him about the schedule of treatment with durvalumab.  I discussed with him about the small possibility of an allergic reaction and during the infusion.  Most importantly I discussed with him about the variety of immune mediated side effects that can happen occasionally with the  medication.  We discussed about hypothyroidism, lung injury, intestinal injury with the diarrhea, liver failure and a myriad of other problems that can occur.  He understands that this can be treated by stopping the medication and starting him on immunosuppressive treatment.  I have given him printed information from the  to read.  He has signed a consent form for treatment.  He would like to start in about 3 weeks time.    6.  The patient and wife are thinking about the possibility of going to summer in [place] to live.  Perhaps for the winter perhaps permanently.  They are thinking about the [place] area.  I discussed with them that if they are making that change, then they will need to find an oncologist in that area who is willing to take him on can give him the treatment.  They voiced understanding.    7.  Follow-up: Return to clinic with MD or NP in 3 weeks with labs according to treatment plan and appointment for the first infusion of durvalumab.  The PET CT scan is to be scheduled in 6 weeks time.    Time spend >30 minutes face to face with the patient. More than 50 % in counseling and coordination of care.    Problem List    1. Primary cancer of right upper lobe of lung (H)  CC OFFICE VISIT LONG    CC OFFICE VISIT LONG    Infusion Appointment    alum/mag hydrox-simethicone-diphenhydramine-lidocaine (MAGIC MOUTHWASH) suspension    NM PET CT Skull to Mid Thigh   2. Radiation esophagitis  alum/mag hydrox-simethicone-diphenhydramine-lidocaine (MAGIC MOUTHWASH) suspension   3. Pharyngoesophageal dysphagia  alum/mag hydrox-simethicone-diphenhydramine-lidocaine (MAGIC MOUTHWASH) suspension        CC: Maria De Jesus Fregoso MD Elizabeth Cameron, MD Andrew Hipp, DO    ______________________________________________________________________________    History of Present Illness    Mr. Jared Moeller is here for follow-up with his wife.    He completed his radiation on October 18.  Says that he still  has some pain in his throat.  When he swallows he has to be careful.  He says that he has to chew bread etc. very fine before swallowing.  However food does not get stuck.  He thinks that he has lost a couple of pounds of weight.    Cough is about the same.  He says that he feels like his sinuses are draining.  He says he is not bringing up any phlegm.  Skin in the area of radiation has not had any problems.    No nausea or vomiting.  No unusual headaches.  No eyesight problems.  No mouth sores.  No abdominal pain.  No constipation and no diarrhea.  No blood in stool or black stools.  No difficulty with urination.  No skin rashes.  No numbness or tingling.    Please see below.  A 14 point review of system is otherwise completely negative.        Pain Status  Currently in Pain: No/denies    Review of Systems    Constitutional  Constitutional (WDL): Exceptions to WDL  Fatigue: Fatigue relieved by rest  Fever: None  Chills: None  Weight Gain: None  Weight Loss: to <10% from baseline, intervention not indicated(3#)  Neurosensory  Neurosensory (WDL): Exceptions to WDL  Peripheral Motor Neuropathy: None  Ataxia: None  Peripheral Sensory Neuropathy: Asymptomatic, loss of deep tendon reflexes or paresthesia(chronic t foot bunion)  Confusion: None  Syncope: None  Cardiovascular  Cardiovascular (WDL): All cardiovascular elements are within defined limits  Palpitations: None  Edema: No  Phlebitis: None  Superficial thrombophlebitis: None  Pulmonary  Respiratory (WDL): Exceptions to WDL(sinus, PND)  Cough: Mild symptoms, nonprescription intervention indicated  Dyspnea: Shortness of breath with moderate exertion  Hypoxia: None  Gastrointestinal  Gastrointestinal (WDL): Exceptions to WDL  Anorexia: Loss of appetite without alteration in eating habits  Constipation: None  Diarrhea: None  Dysphagia: Symptomatic, able to eat regular diet(throat)  Esophagitis: Asymptomatic, clinical or diagnostic observations only, intervention not  indicated  Nausea: None  Pharyngitis: Localized, local intervention indicated (e.g., topical antibiotic, antifungal, or antiviral)  Vomiting: None  Dysgeusia: Altered taste but no change in diet  Dry Mouth: None  Genitourinary  Genitourinary (WDL): All genitourinary elements are within defined limits  Integumentary  Integumentary (WDL): Exceptions to WDL  Alopecia: Hair loss of up to 50% of normal for that individual that is not obvious from a distance but only on close inspection, a different hair style may be required to cover the hair loss but it does not require a wig or hair piece to camouflage  Rash Maculo-Papular: None  Pruritus: None  Urticaria: None  Palmar-Plantar Erythrodysesthesia Syndrome: None  Flushing: None  Patient Coping  Patient Coping: Open/discussion  Distress Assessment  Distress Assessment Score: No distress  Accompanied by  Accompanied by: Family Member    Past History  Past Medical History:   Diagnosis Date     BPH (benign prostatic hyperplasia)      Carotid stenosis, bilateral 04/07/2016    left 50-69%. moderate right side.     Chronic kidney disease (CKD), stage III (moderate) (H)      Colon polyps      COPD (chronic obstructive pulmonary disease) (H)      Epididymal cyst, left side. 04/07/2016     Hematuria 12/2002    Work-up was negative.     Hypertension      Inguinal hernia     Right     Nephritis     11 years old.     Peripheral vascular disease (H)      Polio     6 years of age. Right leg. Recovered fully.     Shingles 1996    left forehead.         Past Surgical History:   Procedure Laterality Date     COLONOSCOPY       CYSTOSCOPY  2002     FLEXIBLE BRONCHOSCOPY  08/14/2019    with EBUS FNA>     IR EXTREMITY ANGIOGRAM BILATERAL  04/06/2016     IR PORT PLACEMENT >5 YEARS  9/11/2019     NASAL SEPTUM SURGERY  1980s     TONSILLECTOMY  1948       Physical Exam    Recent Vitals 10/21/2019   Weight 119 lbs 3 oz   BSA (m2) 1.56 m2   /60   Pulse 111   Temp 97.7   Temp src 1   SpO2 95    Some recent data might be hidden       GENERAL: Alert and oriented to time place and person. Seated comfortably. In no distress.  Thin build.  He looks well overall.    HEAD: Atraumatic and normocephalic.    EYES: DANYA, EOMI.  No pallor.  No icterus.    Oral cavity: no mucosal lesion or tonsillar enlargement.  Cannot see any redness of the throat or thrush.    NECK: supple. JVP normal.  No thyroid enlargement.    LYMPH NODES: No palpable, cervical, axillary or inguinal lymphadenopathy.    CHEST: clear to auscultation bilaterally.  Resonant to percussion throughout bilaterally.  Symmetrical breath movements bilaterally.  Port-A-Cath over the left upper chest looks unremarkable.    CVS: S1 and S2 are heard. Regular rate and rhythm.  No murmur or gallop or rub heard.  No peripheral edema.    ABDOMEN: Soft. Not tender. Not distended.  No palpable hepatomegaly or splenomegaly.  No other mass palpable.  Bowel sounds heard.    EXTREMITIES: Warm.    SKIN: no rash, or bruising or purpura.  Has a full head of hair.  Hair has thinned a bit but he has not lost his hair.            Lab Results    Recent Results (from the past 24 hour(s))   Comprehensive Metabolic Panel    Collection Time: 10/21/19  8:06 AM   Result Value Ref Range    Sodium 139 136 - 145 mmol/L    Potassium 4.6 3.5 - 5.0 mmol/L    Chloride 109 (H) 98 - 107 mmol/L    CO2 22 22 - 31 mmol/L    Anion Gap, Calculation 8 5 - 18 mmol/L    Glucose 105 70 - 125 mg/dL    BUN 30 (H) 8 - 28 mg/dL    Creatinine 1.68 (H) 0.70 - 1.30 mg/dL    GFR MDRD Af Amer 48 (L) >60 mL/min/1.73m2    GFR MDRD Non Af Amer 40 (L) >60 mL/min/1.73m2    Bilirubin, Total 0.4 0.0 - 1.0 mg/dL    Calcium 8.4 (L) 8.5 - 10.5 mg/dL    Protein, Total 6.1 6.0 - 8.0 g/dL    Albumin 3.4 (L) 3.5 - 5.0 g/dL    Alkaline Phosphatase 62 45 - 120 U/L    AST 38 0 - 40 U/L    ALT 50 (H) 0 - 45 U/L   HM1 (CBC with Diff)    Collection Time: 10/21/19  8:06 AM   Result Value Ref Range    WBC 2.1 (L) 4.0 - 11.0  thou/uL    RBC 2.79 (L) 4.40 - 6.20 mill/uL    Hemoglobin 8.4 (L) 14.0 - 18.0 g/dL    Hematocrit 26.0 (L) 40.0 - 54.0 %    MCV 93 80 - 100 fL    MCH 30.1 27.0 - 34.0 pg    MCHC 32.3 32.0 - 36.0 g/dL    RDW 18.4 (H) 11.0 - 14.5 %    Platelets 149 140 - 440 thou/uL    MPV 9.4 8.5 - 12.5 fL    Neutrophils % 63 50 - 70 %    Lymphocytes % 17 (L) 20 - 40 %    Monocytes % 13 (H) 2 - 10 %    Eosinophils % 5 0 - 6 %    Basophils % 1 0 - 2 %    Neutrophils Absolute 1.3 (L) 2.0 - 7.7 thou/uL    Lymphocytes Absolute 0.4 (L) 0.8 - 4.4 thou/uL    Monocytes Absolute 0.3 0.0 - 0.9 thou/uL    Eosinophils Absolute 0.1 0.0 - 0.4 thou/uL    Basophils Absolute 0.0 0.0 - 0.2 thou/uL         Imaging    No results found.      Signed by: Jada Lopez MD

## 2021-06-02 NOTE — PROGRESS NOTES
I met with Art and Pat prior to Art's visit with Dr. Denny today.  He has mild discomfort with swallowing certain foods but feels his intake has been good and he's maintaining his wt.  He met with MAURI Ruiz, and was given some helpful recommendations.   He is tolerating systemic tx well and has had few side effects.  He carries his antiemetic so it's available should he need it.    Art and Pat are doing well and they deny a need for additional resources at this time.  I will continue to check in and I invited calls as well.

## 2021-06-02 NOTE — PATIENT INSTRUCTIONS - HE
Recent Results (from the past 24 hour(s))   Comprehensive Metabolic Panel   Result Value Ref Range    Sodium 138 136 - 145 mmol/L    Potassium 4.5 3.5 - 5.0 mmol/L    Chloride 106 98 - 107 mmol/L    CO2 21 (L) 22 - 31 mmol/L    Anion Gap, Calculation 11 5 - 18 mmol/L    Glucose 142 (H) 70 - 125 mg/dL    BUN 30 (H) 8 - 28 mg/dL    Creatinine 1.53 (H) 0.70 - 1.30 mg/dL    GFR MDRD Af Amer 54 (L) >60 mL/min/1.73m2    GFR MDRD Non Af Amer 44 (L) >60 mL/min/1.73m2    Bilirubin, Total 0.3 0.0 - 1.0 mg/dL    Calcium 9.3 8.5 - 10.5 mg/dL    Protein, Total 6.4 6.0 - 8.0 g/dL    Albumin 3.2 (L) 3.5 - 5.0 g/dL    Alkaline Phosphatase 64 45 - 120 U/L    AST 12 0 - 40 U/L    ALT 10 0 - 45 U/L   HM1 (CBC with Diff)   Result Value Ref Range    WBC 3.3 (L) 4.0 - 11.0 thou/uL    RBC 3.08 (L) 4.40 - 6.20 mill/uL    Hemoglobin 9.1 (L) 14.0 - 18.0 g/dL    Hematocrit 28.3 (L) 40.0 - 54.0 %    MCV 92 80 - 100 fL    MCH 29.5 27.0 - 34.0 pg    MCHC 32.2 32.0 - 36.0 g/dL    RDW 16.1 (H) 11.0 - 14.5 %    Platelets 146 140 - 440 thou/uL    MPV 9.9 8.5 - 12.5 fL    Neutrophils % 74 (H) 50 - 70 %    Lymphocytes % 13 (L) 20 - 40 %    Monocytes % 9 2 - 10 %    Eosinophils % 3 0 - 6 %    Basophils % 1 0 - 2 %    Neutrophils Absolute 2.4 2.0 - 7.7 thou/uL    Lymphocytes Absolute 0.4 (L) 0.8 - 4.4 thou/uL    Monocytes Absolute 0.3 0.0 - 0.9 thou/uL    Eosinophils Absolute 0.1 0.0 - 0.4 thou/uL    Basophils Absolute 0.0 0.0 - 0.2 thou/uL

## 2021-06-02 NOTE — PATIENT INSTRUCTIONS - HE
Recent Results (from the past 24 hour(s))   Comprehensive Metabolic Panel   Result Value Ref Range    Sodium 137 136 - 145 mmol/L    Potassium 4.6 3.5 - 5.0 mmol/L    Chloride 108 (H) 98 - 107 mmol/L    CO2 23 22 - 31 mmol/L    Anion Gap, Calculation 6 5 - 18 mmol/L    Glucose 100 70 - 125 mg/dL    BUN 27 8 - 28 mg/dL    Creatinine 1.50 (H) 0.70 - 1.30 mg/dL    GFR MDRD Af Amer 55 (L) >60 mL/min/1.73m2    GFR MDRD Non Af Amer 45 (L) >60 mL/min/1.73m2    Bilirubin, Total 0.2 0.0 - 1.0 mg/dL    Calcium 9.2 8.5 - 10.5 mg/dL    Protein, Total 6.1 6.0 - 8.0 g/dL    Albumin 3.2 (L) 3.5 - 5.0 g/dL    Alkaline Phosphatase 69 45 - 120 U/L    AST 30 0 - 40 U/L    ALT 36 0 - 45 U/L   HM1 (CBC with Diff)   Result Value Ref Range    WBC 2.4 (L) 4.0 - 11.0 thou/uL    RBC 2.86 (L) 4.40 - 6.20 mill/uL    Hemoglobin 8.6 (L) 14.0 - 18.0 g/dL    Hematocrit 26.5 (L) 40.0 - 54.0 %    MCV 93 80 - 100 fL    MCH 30.1 27.0 - 34.0 pg    MCHC 32.5 32.0 - 36.0 g/dL    RDW 17.1 (H) 11.0 - 14.5 %    Platelets 138 (L) 140 - 440 thou/uL    MPV 9.8 8.5 - 12.5 fL    Neutrophils % 65 50 - 70 %    Lymphocytes % 15 (L) 20 - 40 %    Monocytes % 13 (H) 2 - 10 %    Eosinophils % 4 0 - 6 %    Basophils % 0 0 - 2 %    Neutrophils Absolute 1.6 (L) 2.0 - 7.7 thou/uL    Lymphocytes Absolute 0.4 (L) 0.8 - 4.4 thou/uL    Monocytes Absolute 0.3 0.0 - 0.9 thou/uL    Eosinophils Absolute 0.1 0.0 - 0.4 thou/uL    Basophils Absolute 0.0 0.0 - 0.2 thou/uL

## 2021-06-02 NOTE — PROGRESS NOTES
Patient had left a message for the radiation nurses asking what over the counter cold medication he could take while under concurrent treatment for his lung cancer.  Messaged Dr. Lopez and he was okay with patient using over the counter medication for a cold.  Talked to patient when he came in for his daily radiation and asked what symptoms he was having.  Patient states he felt like he was coming down with a head cold the past couple days and just wanted to be sure he could use OTC medication should he need to.  Today states he is feeling better and does not need to take anything.    Talked to patient about things he can do to prevent getting a cold while under treatment such as getting plenty of rest, avoiding crowds or people who are sick, washing hands frequently, drinking plenty of fluids.  Patient also has Nyquil and Claritin on his medication list and informed patient this would be okay to use as long as it didn't lower his BP to much.  Also told patient he could check with the pharmacy before buying anything to make sure it did not interact with his BP medication or have hypotension as a potential side effect.    Patient able to verbalize understanding and left ambulatory.

## 2021-06-02 NOTE — PROGRESS NOTES
Patient here ambulatory accompanied by wife for final radiation therapy treatment for his lung cancer.  Patient feels he did well getting through treatment.  Written discharge instructions reviewed and given to patient.  Follow up with Dr. Denny in about 4 to 6 weeks.  Patient has follow up on Monday with medical oncology and will coordinate follow up with us on the same day as his future medical oncology appointment.

## 2021-06-03 VITALS
TEMPERATURE: 97.6 F | BODY MASS INDEX: 20.77 KG/M2 | OXYGEN SATURATION: 96 % | SYSTOLIC BLOOD PRESSURE: 116 MMHG | DIASTOLIC BLOOD PRESSURE: 73 MMHG | HEART RATE: 109 BPM | WEIGHT: 121 LBS

## 2021-06-03 VITALS
TEMPERATURE: 97.8 F | SYSTOLIC BLOOD PRESSURE: 117 MMHG | DIASTOLIC BLOOD PRESSURE: 57 MMHG | HEART RATE: 121 BPM | OXYGEN SATURATION: 97 % | WEIGHT: 119.2 LBS | BODY MASS INDEX: 20.46 KG/M2

## 2021-06-03 VITALS
BODY MASS INDEX: 20.77 KG/M2 | DIASTOLIC BLOOD PRESSURE: 67 MMHG | HEART RATE: 108 BPM | OXYGEN SATURATION: 99 % | SYSTOLIC BLOOD PRESSURE: 138 MMHG | TEMPERATURE: 97.5 F | WEIGHT: 121 LBS

## 2021-06-03 VITALS
WEIGHT: 118.6 LBS | BODY MASS INDEX: 20.36 KG/M2 | OXYGEN SATURATION: 97 % | TEMPERATURE: 97.9 F | HEART RATE: 121 BPM | SYSTOLIC BLOOD PRESSURE: 116 MMHG | DIASTOLIC BLOOD PRESSURE: 59 MMHG

## 2021-06-03 VITALS
TEMPERATURE: 97.6 F | BODY MASS INDEX: 20.86 KG/M2 | WEIGHT: 121.5 LBS | OXYGEN SATURATION: 97 % | DIASTOLIC BLOOD PRESSURE: 64 MMHG | HEART RATE: 104 BPM | SYSTOLIC BLOOD PRESSURE: 140 MMHG

## 2021-06-03 VITALS — HEIGHT: 66 IN | WEIGHT: 120 LBS | BODY MASS INDEX: 19.29 KG/M2

## 2021-06-03 VITALS — BODY MASS INDEX: 20.49 KG/M2 | WEIGHT: 120 LBS | HEIGHT: 64 IN

## 2021-06-03 VITALS
HEART RATE: 115 BPM | WEIGHT: 122.5 LBS | SYSTOLIC BLOOD PRESSURE: 100 MMHG | DIASTOLIC BLOOD PRESSURE: 58 MMHG | OXYGEN SATURATION: 98 % | BODY MASS INDEX: 21.03 KG/M2

## 2021-06-03 VITALS
HEART RATE: 114 BPM | SYSTOLIC BLOOD PRESSURE: 137 MMHG | WEIGHT: 120.7 LBS | OXYGEN SATURATION: 99 % | DIASTOLIC BLOOD PRESSURE: 61 MMHG | BODY MASS INDEX: 20.72 KG/M2 | TEMPERATURE: 97.7 F

## 2021-06-03 VITALS — BODY MASS INDEX: 19.37 KG/M2 | WEIGHT: 120 LBS

## 2021-06-03 VITALS
WEIGHT: 119.2 LBS | OXYGEN SATURATION: 95 % | TEMPERATURE: 97.7 F | BODY MASS INDEX: 20.46 KG/M2 | HEART RATE: 111 BPM | DIASTOLIC BLOOD PRESSURE: 60 MMHG | SYSTOLIC BLOOD PRESSURE: 141 MMHG

## 2021-06-03 VITALS — HEIGHT: 66 IN | BODY MASS INDEX: 19.29 KG/M2 | WEIGHT: 120 LBS

## 2021-06-03 VITALS
SYSTOLIC BLOOD PRESSURE: 118 MMHG | WEIGHT: 121.6 LBS | HEART RATE: 113 BPM | TEMPERATURE: 97.7 F | BODY MASS INDEX: 20.87 KG/M2 | DIASTOLIC BLOOD PRESSURE: 59 MMHG | OXYGEN SATURATION: 100 %

## 2021-06-03 VITALS — WEIGHT: 121.3 LBS | BODY MASS INDEX: 20.82 KG/M2

## 2021-06-03 VITALS
SYSTOLIC BLOOD PRESSURE: 104 MMHG | BODY MASS INDEX: 21.13 KG/M2 | TEMPERATURE: 98 F | HEART RATE: 112 BPM | WEIGHT: 123.1 LBS | DIASTOLIC BLOOD PRESSURE: 58 MMHG | OXYGEN SATURATION: 95 %

## 2021-06-03 VITALS
DIASTOLIC BLOOD PRESSURE: 64 MMHG | WEIGHT: 121 LBS | BODY MASS INDEX: 20.77 KG/M2 | HEART RATE: 104 BPM | TEMPERATURE: 98 F | SYSTOLIC BLOOD PRESSURE: 118 MMHG

## 2021-06-03 VITALS
OXYGEN SATURATION: 100 % | BODY MASS INDEX: 20.62 KG/M2 | TEMPERATURE: 97.8 F | HEART RATE: 107 BPM | DIASTOLIC BLOOD PRESSURE: 68 MMHG | WEIGHT: 120.1 LBS | SYSTOLIC BLOOD PRESSURE: 157 MMHG

## 2021-06-03 VITALS
WEIGHT: 121 LBS | RESPIRATION RATE: 12 BRPM | DIASTOLIC BLOOD PRESSURE: 60 MMHG | HEART RATE: 88 BPM | SYSTOLIC BLOOD PRESSURE: 122 MMHG | HEIGHT: 64 IN | OXYGEN SATURATION: 95 % | BODY MASS INDEX: 20.66 KG/M2

## 2021-06-03 VITALS
SYSTOLIC BLOOD PRESSURE: 112 MMHG | BODY MASS INDEX: 20.6 KG/M2 | WEIGHT: 120 LBS | HEART RATE: 120 BPM | DIASTOLIC BLOOD PRESSURE: 59 MMHG | OXYGEN SATURATION: 97 % | TEMPERATURE: 97.7 F

## 2021-06-03 VITALS
DIASTOLIC BLOOD PRESSURE: 68 MMHG | HEART RATE: 112 BPM | SYSTOLIC BLOOD PRESSURE: 146 MMHG | WEIGHT: 119.1 LBS | BODY MASS INDEX: 20.44 KG/M2 | OXYGEN SATURATION: 96 % | TEMPERATURE: 97.9 F

## 2021-06-03 VITALS
BODY MASS INDEX: 20.86 KG/M2 | OXYGEN SATURATION: 99 % | DIASTOLIC BLOOD PRESSURE: 61 MMHG | HEART RATE: 113 BPM | SYSTOLIC BLOOD PRESSURE: 111 MMHG | TEMPERATURE: 97.9 F | WEIGHT: 121.5 LBS

## 2021-06-03 VITALS
DIASTOLIC BLOOD PRESSURE: 55 MMHG | BODY MASS INDEX: 20.79 KG/M2 | WEIGHT: 121.1 LBS | HEART RATE: 116 BPM | SYSTOLIC BLOOD PRESSURE: 104 MMHG | OXYGEN SATURATION: 98 % | TEMPERATURE: 97.9 F

## 2021-06-03 VITALS — HEIGHT: 64 IN | BODY MASS INDEX: 20.69 KG/M2 | WEIGHT: 121.2 LBS

## 2021-06-03 VITALS — WEIGHT: 119.7 LBS | BODY MASS INDEX: 19.32 KG/M2

## 2021-06-03 NOTE — PROGRESS NOTES
Pt here today for first dose of Durvalumab, he tolerated infusion well and and left clinic ambulatory and independent.

## 2021-06-03 NOTE — PROGRESS NOTES
[facility] Hematology and Oncology Progress Note    Patient: Jared Moeller  MRN: [id number]  Date of Service: 11/11/2019        Reason for Visit:    D1C1 maintenance Durvalumab immunotherapy      Assessment and Plan:    Cancer Staging    1. Primary cancer of right upper lobe of lung (H)    Clinical Stage IIIB (cT4, cN2, cM0)     78 y.o.     Personal history of COPD.  Quit smoking in 2015 with 46 year p/h.      07/31/19 CT chest - obstructing central right suprahilar mass and complete atelectasis right upper lobe. 8mm rounded indeterminate nodule at left lung base. Severe emphysema.    08/09/19 NM PET - 4.3 x 4.7 x 5.6 cm mass in the central right upper lobe involving the right suprahilar region and right lower paratracheal mediastinum by direct extension. Associated complete right upper lobe atelectasis. Separate FDG avid right lower paratracheal station 4 and subcarinal station 7 (SUVmax 7.2), adenopathy suspicious for arik metastases. Few prominent right anterior cardiophrenic nodes with low-level uptake are indeterminate but suspicious for additional early arik metastases. Mild areas of subpleural thickening in the posterior right chest associated with low-level but appreciable uptake. No pleural effusion. No suspicious focal uptake in the liver skeleton. Normal adrenal glands. Severe emphysema. Moderate atherosclerotic calcifications including the coronary arteries. 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 Marked prostate gland enlargement. Large left scrotal hydrocele. Moderate scattered changes in the spine.    08/14/19 FNA through the endobronchial ultrasound - showed moderately differentiated squamous cell cancer of the lung.  The mediastinal lymph nodes were also positive.    08/28/19 MR brain - No findings to suggest intracranial metastases. No evidence of an acute intracranial abnormality. Findings compatible with mild chronic small  vessel ischemic change.    08/30/19 US kidney - Both kidneys appear echogenic suggesting medical renal disease. There is mild bilateral renal atrophy.  Enlarged prostate gland.    There was concern that he may have pulmonary tuberculosis because the auramine rhodamine stain for acid-fast bacilli was positive and the aspirate from the lower paratracheal lymph node.    Kinyoun stain for AFB came back negative.      Mantoux was negative.      QuantiFERON test was negative.      Sputum AFB x3 was negative by staining and by PCR.      Cultures were negative.      Consult with  of infectious diseases.    Chemoradiation with a small chance of cure was recommended.  Considering his kidney failure, carboplatin rather than cisplatin will be used.      09/09 - 10/14/19 completed weekly carboplatin (AUC 2) + paclitaxel (50 mg/m ) chemotherapy concurrent with 30 fx / 6000 cGy EBRT.      09/11/19 - IR port-a-cath placement.    11/11/19:    HEME1 - WBC, ANC and Plts WNL.  HgB slowly increasing @ 9.7.    Neutropenic precautions reviewed.    Found to be mildly iron deficient at his nephrology appointment - started on SR-iron, one daily - tolerating OK.    CMP - improved CKD.  Stable hypoalbuminemia @ 3.2.      Encouraged pushing oral fluids.    Encouraged 1 Boost protein or CIB supplement/day.    TSH - WNL.    Art presents accompanied by his wife, in good spirits anticipating getting started on his immunotherapy.  He continues to note mild fatigue and mild shortness of breath with excessive activity.  He has a history of PAD in his legs - used to walk a mile/day, now limited to the walking required for shopping.  Occasional Tylenol PM for sleep.  No nausea and no emesis.  Mostly resolved radiation esophagitis.      Potential/likely side effects of immunotherapy were reviewed.      He has reviewed the [facility] patient education handout on Durvalumab maintenance therapy previously given.  Any residual questions were  reviewed.    He has signed a consent to treat.     Received the annual flu vaccine.    11/25/19 - D15C1 Durvalumab.    12/09/19 - follow up D1C2 Durvalumab therapy with labs per Treatment Plan and NM PET.    Will plan for maintenance immune mediated treatment with Durvalumab IV given every 2 weeks for 1-2 years.    2.  Chronic kidney disease stage III.      Likely a primary kidney disorder r/t the nephritis he had as a child.      Follows with Nephrology, Dr. Rubin of [facility] [place].      Very stable.    Work-up is ongoing.      Next follow up after 01/01/2000.     ECOG Performance:     ECOG Performance Status: 0    Distress Assessment:    Distress Assessment Score: No distress    Pain:             TT > 25 minutes face to face with > 50 % in counseling and coordination of care.    Merlin Mendenhall, CNP     CC: Maria De Jesus Fregoso MD Elizabeth Cameron, MD Mulugeta Rubin, DO  __________________________________________________    Interim History:    Mr. Jared Moeller presents accompanied by his wife, in good spirits anticipating getting started on his maintenance immunotherapy.  He looks and feels quite good however continues to note mild fatigue and mild shortness of breath with excessive activity.  Mostly resolved radiation esophagitis.  His appetite is good and weight quite stable.  No nausea and no emesis.  He has a history of PAD in his legs.  He used to walk a mile/day, now limited to shopping walking.  He does take occasional Tylenol, 500 mg, generally a PM at night for sleep.  No concerning chronic neck and shoulder discomfort since bought an ergonomic pillow.  He denies concerning pain, fever or chills, nausea or vomiting, constipation or diarrhea, difficulty with urination, skin rash.    Pain Status:    Currently in Pain: No/denies     Review of Systems:    Constitutional  Weight Gain: 5 - <10% from baseline  Neurosensory  Neurosensory (WDL): All neurosensory elements are within  defined limits  Eye   Eye Disorder (WDL): Exceptions to WDL  Dry Eye: Asymptomatic, clinical or diagnostic observations only, mild symptoms relieved by lubricants(itchy)  Eye Pain: Mild pain(itchy)  Ear  Ear Disorder (WDL): Exceptions to WDL  Tinnitus: Mild symptoms, intervention not indicated(chronic)  Cardiovascular  Cardiovascular (WDL): All cardiovascular elements are within defined limits  Pulmonary  Respiratory (WDL): Exceptions to WDL(PND)  Cough: Mild symptoms, nonprescription intervention indicated(dry)  Dyspnea: Shortness of breath with moderate exertion  Gastrointestinal  Gastrointestinal (WDL): Exceptions to WDL  Dysgeusia: Altered taste but no change in diet(improving)  Genitourinary  Genitourinary (WDL): All genitourinary elements are within defined limits  Lymphatic  Lymph (WDL): Assessment not pertinent to visit  Musculoskeletal and Connective Tissue  Musculoskeletal and Connetive Tissue Disorders (WDL): All Musculoskeletal and Connetive Tissue Disorder elements are within defined limits  Integumentary  Integumentary (WDL): Exceptions to WDL  Alopecia: Hair loss of up to 50% of normal for that individual that is not obvious from a distance but only on close inspection, a different hair style may be required to cover the hair loss but it does not require a wig or hair piece to camouflage  Patient Coping  Patient Coping: Open/discussion  Distress Assessment  Distress Assessment Score: No distress  Accompanied by  Accompanied by: Family Member  Oral Chemo Adherence       Past History:    Past Medical History:   Diagnosis Date     BPH (benign prostatic hyperplasia)      Carotid stenosis, bilateral 04/07/2016    left 50-69%. moderate right side.     Chronic kidney disease (CKD), stage III (moderate) (H)      Colon polyps      COPD (chronic obstructive pulmonary disease) (H)      Epididymal cyst, left side. 04/07/2016     Hematuria 12/2002    Work-up was negative.     Hypertension      Inguinal hernia      Right     Nephritis     11 years old.     Peripheral vascular disease (H)      Polio     6 years of age. Right leg. Recovered fully.     Shingles 1996    left forehead.         Past Surgical History:   Procedure Laterality Date     COLONOSCOPY       CYSTOSCOPY  2002     FLEXIBLE BRONCHOSCOPY  08/14/2019    with EBUS FNA>     IR EXTREMITY ANGIOGRAM BILATERAL  04/06/2016     IR PORT PLACEMENT >5 YEARS  9/11/2019     NASAL SEPTUM SURGERY  1980s     TONSILLECTOMY  1948       Physical Exam:    Recent Vitals 11/11/2019   Weight 125 lbs 10 oz   BSA (m2) 1.6 m2   /72   Pulse 114   Temp 97.5   Temp src 1   SpO2 96   Some recent data might be hidden       GENERAL:   Pleasant.  Alert and oriented.  Comfortable.  In no acute distress.  Thin build.  Spouse accompanies.    HEENT:   Atraumatic and normocephalic.  DANYA.  EOMI.  No pallor.  No icterus. No mucosal lesions.    LYMPH NODES:  No palpable cervical, axillary or inguinal lymphadenopathy.    CHEST:   Lungs quite clear to auscultation bilaterally.    Port-a-cath in place.  No s/s infection.    CVS:    S1 and S2 aheard.  Regular rate and rhythm. No murmur, gallop or rub heard.  No peripheral edema.    ABDOMEN:   Soft.  Not tender or distended. No palpable hepatomegaly or splenomegaly.     EXTREMITIES:  Warm.    SKIN:    No rash, bruising or purpura noted.    Lab Results:    Reviewed with patient.    Recent Results (from the past 24 hour(s))   Comprehensive Metabolic Panel   Result Value Ref Range    Sodium 139 136 - 145 mmol/L    Potassium 4.4 3.5 - 5.0 mmol/L    Chloride 108 (H) 98 - 107 mmol/L    CO2 24 22 - 31 mmol/L    Anion Gap, Calculation 7 5 - 18 mmol/L    Glucose 141 (H) 70 - 125 mg/dL    BUN 36 (H) 8 - 28 mg/dL    Creatinine 1.39 (H) 0.70 - 1.30 mg/dL    GFR MDRD Af Amer 60 (L) >60 mL/min/1.73m2    GFR MDRD Non Af Amer 49 (L) >60 mL/min/1.73m2    Bilirubin, Total 0.3 0.0 - 1.0 mg/dL    Calcium 8.9 8.5 - 10.5 mg/dL    Protein, Total 6.1 6.0 - 8.0 g/dL     Albumin 3.2 (L) 3.5 - 5.0 g/dL    Alkaline Phosphatase 68 45 - 120 U/L    AST 15 0 - 40 U/L    ALT 17 0 - 45 U/L   TSH   Result Value Ref Range    TSH 1.12 0.30 - 5.00 uIU/mL   HM1 (CBC with Diff)   Result Value Ref Range    WBC 4.2 4.0 - 11.0 thou/uL    RBC 3.15 (L) 4.40 - 6.20 mill/uL    Hemoglobin 9.7 (L) 14.0 - 18.0 g/dL    Hematocrit 31.1 (L) 40.0 - 54.0 %    MCV 99 80 - 100 fL    MCH 30.8 27.0 - 34.0 pg    MCHC 31.2 (L) 32.0 - 36.0 g/dL    RDW 20.4 (H) 11.0 - 14.5 %    Platelets 160 140 - 440 thou/uL    MPV 9.2 8.5 - 12.5 fL    Neutrophils % 70 50 - 70 %    Lymphocytes % 11 (L) 20 - 40 %    Monocytes % 14 (H) 2 - 10 %    Eosinophils % 3 0 - 6 %    Basophils % 1 0 - 2 %    Neutrophils Absolute 3.0 2.0 - 7.7 thou/uL    Lymphocytes Absolute 0.5 (L) 0.8 - 4.4 thou/uL    Monocytes Absolute 0.6 0.0 - 0.9 thou/uL    Eosinophils Absolute 0.1 0.0 - 0.4 thou/uL    Basophils Absolute 0.0 0.0 - 0.2 thou/uL     Imaging:    No new imaging.

## 2021-06-03 NOTE — PROGRESS NOTES
Art is here today for ongoing management and tx of lung cancer. Today is D1C1 Durvalumab pending labs and OV with Merlin Mendenhall NP. Elsie Simmons

## 2021-06-03 NOTE — PROGRESS NOTES
Pt presented for labs, and durvalumab infusion. Pt tolerated well and discharged home with his wife. Bailey Rich RN

## 2021-06-03 NOTE — PATIENT INSTRUCTIONS - HE
Recent Results (from the past 24 hour(s))   Comprehensive Metabolic Panel   Result Value Ref Range    Sodium 139 136 - 145 mmol/L    Potassium 4.4 3.5 - 5.0 mmol/L    Chloride 108 (H) 98 - 107 mmol/L    CO2 24 22 - 31 mmol/L    Anion Gap, Calculation 7 5 - 18 mmol/L    Glucose 141 (H) 70 - 125 mg/dL    BUN 36 (H) 8 - 28 mg/dL    Creatinine 1.39 (H) 0.70 - 1.30 mg/dL    GFR MDRD Af Amer 60 (L) >60 mL/min/1.73m2    GFR MDRD Non Af Amer 49 (L) >60 mL/min/1.73m2    Bilirubin, Total 0.3 0.0 - 1.0 mg/dL    Calcium 8.9 8.5 - 10.5 mg/dL    Protein, Total 6.1 6.0 - 8.0 g/dL    Albumin 3.2 (L) 3.5 - 5.0 g/dL    Alkaline Phosphatase 68 45 - 120 U/L    AST 15 0 - 40 U/L    ALT 17 0 - 45 U/L   TSH   Result Value Ref Range    TSH 1.12 0.30 - 5.00 uIU/mL   HM1 (CBC with Diff)   Result Value Ref Range    WBC 4.2 4.0 - 11.0 thou/uL    RBC 3.15 (L) 4.40 - 6.20 mill/uL    Hemoglobin 9.7 (L) 14.0 - 18.0 g/dL    Hematocrit 31.1 (L) 40.0 - 54.0 %    MCV 99 80 - 100 fL    MCH 30.8 27.0 - 34.0 pg    MCHC 31.2 (L) 32.0 - 36.0 g/dL    RDW 20.4 (H) 11.0 - 14.5 %    Platelets 160 140 - 440 thou/uL    MPV 9.2 8.5 - 12.5 fL    Neutrophils % 70 50 - 70 %    Lymphocytes % 11 (L) 20 - 40 %    Monocytes % 14 (H) 2 - 10 %    Eosinophils % 3 0 - 6 %    Basophils % 1 0 - 2 %    Neutrophils Absolute 3.0 2.0 - 7.7 thou/uL    Lymphocytes Absolute 0.5 (L) 0.8 - 4.4 thou/uL    Monocytes Absolute 0.6 0.0 - 0.9 thou/uL    Eosinophils Absolute 0.1 0.0 - 0.4 thou/uL    Basophils Absolute 0.0 0.0 - 0.2 thou/uL

## 2021-06-03 NOTE — PROGRESS NOTES
I met with Kendall and Kirsten in the infusion room.  Today is his first cycle of durvalumab and he is hoping he tolerates it well.  It's been three weeks since he completed concurrent tx and he's feeling pretty good now.  His energy is returning and he's eating and drinking well.  His esophagitis resolved quickly and he's gained some wt back over the past few weeks.  Kendall and Kirsten ar doing well at home and they deny a need for additional resources at this time.  I will continue to check in and I invited calls as well.

## 2021-06-04 VITALS
SYSTOLIC BLOOD PRESSURE: 139 MMHG | DIASTOLIC BLOOD PRESSURE: 80 MMHG | WEIGHT: 122.2 LBS | HEART RATE: 104 BPM | TEMPERATURE: 97.5 F | OXYGEN SATURATION: 93 % | BODY MASS INDEX: 20.98 KG/M2

## 2021-06-04 VITALS
DIASTOLIC BLOOD PRESSURE: 64 MMHG | OXYGEN SATURATION: 94 % | TEMPERATURE: 97.5 F | SYSTOLIC BLOOD PRESSURE: 119 MMHG | HEIGHT: 64 IN | WEIGHT: 117 LBS | HEART RATE: 104 BPM | BODY MASS INDEX: 19.97 KG/M2

## 2021-06-04 VITALS
WEIGHT: 123 LBS | HEART RATE: 104 BPM | DIASTOLIC BLOOD PRESSURE: 66 MMHG | TEMPERATURE: 97.6 F | SYSTOLIC BLOOD PRESSURE: 133 MMHG | BODY MASS INDEX: 21 KG/M2 | HEIGHT: 64 IN | OXYGEN SATURATION: 91 %

## 2021-06-04 VITALS
TEMPERATURE: 97.5 F | OXYGEN SATURATION: 97 % | BODY MASS INDEX: 20.74 KG/M2 | HEART RATE: 113 BPM | DIASTOLIC BLOOD PRESSURE: 62 MMHG | WEIGHT: 120.8 LBS | SYSTOLIC BLOOD PRESSURE: 100 MMHG

## 2021-06-04 VITALS
OXYGEN SATURATION: 95 % | HEIGHT: 64 IN | DIASTOLIC BLOOD PRESSURE: 78 MMHG | HEART RATE: 103 BPM | SYSTOLIC BLOOD PRESSURE: 119 MMHG | RESPIRATION RATE: 16 BRPM | WEIGHT: 122.5 LBS | BODY MASS INDEX: 20.92 KG/M2

## 2021-06-04 VITALS
DIASTOLIC BLOOD PRESSURE: 80 MMHG | TEMPERATURE: 97.5 F | HEART RATE: 104 BPM | SYSTOLIC BLOOD PRESSURE: 139 MMHG | OXYGEN SATURATION: 93 % | WEIGHT: 123.5 LBS | BODY MASS INDEX: 21.2 KG/M2

## 2021-06-04 VITALS
BODY MASS INDEX: 20.31 KG/M2 | DIASTOLIC BLOOD PRESSURE: 82 MMHG | HEART RATE: 118 BPM | TEMPERATURE: 97.5 F | OXYGEN SATURATION: 93 % | WEIGHT: 118.3 LBS | SYSTOLIC BLOOD PRESSURE: 135 MMHG

## 2021-06-04 VITALS
BODY MASS INDEX: 20.25 KG/M2 | OXYGEN SATURATION: 92 % | SYSTOLIC BLOOD PRESSURE: 136 MMHG | DIASTOLIC BLOOD PRESSURE: 73 MMHG | WEIGHT: 118 LBS | TEMPERATURE: 97.9 F | HEART RATE: 126 BPM

## 2021-06-04 VITALS
SYSTOLIC BLOOD PRESSURE: 152 MMHG | HEART RATE: 97 BPM | DIASTOLIC BLOOD PRESSURE: 72 MMHG | WEIGHT: 121 LBS | OXYGEN SATURATION: 93 % | TEMPERATURE: 98 F | BODY MASS INDEX: 20.77 KG/M2

## 2021-06-04 VITALS
TEMPERATURE: 97.6 F | SYSTOLIC BLOOD PRESSURE: 153 MMHG | OXYGEN SATURATION: 94 % | BODY MASS INDEX: 20.84 KG/M2 | HEART RATE: 107 BPM | WEIGHT: 121.4 LBS | DIASTOLIC BLOOD PRESSURE: 81 MMHG

## 2021-06-04 VITALS
WEIGHT: 118.4 LBS | HEART RATE: 98 BPM | OXYGEN SATURATION: 98 % | HEIGHT: 64 IN | BODY MASS INDEX: 20.22 KG/M2 | DIASTOLIC BLOOD PRESSURE: 83 MMHG | TEMPERATURE: 97.5 F | SYSTOLIC BLOOD PRESSURE: 163 MMHG

## 2021-06-04 VITALS
SYSTOLIC BLOOD PRESSURE: 152 MMHG | OXYGEN SATURATION: 96 % | BODY MASS INDEX: 20.67 KG/M2 | HEART RATE: 109 BPM | DIASTOLIC BLOOD PRESSURE: 84 MMHG | WEIGHT: 120.4 LBS | TEMPERATURE: 97.9 F

## 2021-06-04 VITALS
SYSTOLIC BLOOD PRESSURE: 170 MMHG | HEART RATE: 115 BPM | TEMPERATURE: 97.6 F | DIASTOLIC BLOOD PRESSURE: 74 MMHG | OXYGEN SATURATION: 97 % | BODY MASS INDEX: 20.46 KG/M2 | WEIGHT: 119.2 LBS

## 2021-06-04 VITALS
RESPIRATION RATE: 28 BRPM | OXYGEN SATURATION: 94 % | WEIGHT: 121 LBS | HEART RATE: 108 BPM | DIASTOLIC BLOOD PRESSURE: 76 MMHG | SYSTOLIC BLOOD PRESSURE: 144 MMHG | BODY MASS INDEX: 20.77 KG/M2

## 2021-06-04 VITALS
TEMPERATURE: 97.4 F | OXYGEN SATURATION: 94 % | SYSTOLIC BLOOD PRESSURE: 184 MMHG | WEIGHT: 119.1 LBS | DIASTOLIC BLOOD PRESSURE: 80 MMHG | HEART RATE: 124 BPM | BODY MASS INDEX: 20.44 KG/M2

## 2021-06-04 VITALS
BODY MASS INDEX: 20.74 KG/M2 | HEART RATE: 110 BPM | SYSTOLIC BLOOD PRESSURE: 164 MMHG | DIASTOLIC BLOOD PRESSURE: 83 MMHG | WEIGHT: 120.8 LBS | OXYGEN SATURATION: 94 % | TEMPERATURE: 97.6 F

## 2021-06-04 VITALS
TEMPERATURE: 97.5 F | OXYGEN SATURATION: 96 % | WEIGHT: 125.6 LBS | SYSTOLIC BLOOD PRESSURE: 131 MMHG | HEART RATE: 114 BPM | DIASTOLIC BLOOD PRESSURE: 72 MMHG | BODY MASS INDEX: 21.56 KG/M2

## 2021-06-04 VITALS
TEMPERATURE: 97.7 F | BODY MASS INDEX: 20.84 KG/M2 | DIASTOLIC BLOOD PRESSURE: 87 MMHG | OXYGEN SATURATION: 92 % | WEIGHT: 121.4 LBS | SYSTOLIC BLOOD PRESSURE: 173 MMHG | HEART RATE: 115 BPM

## 2021-06-04 VITALS — WEIGHT: 117 LBS | BODY MASS INDEX: 20.08 KG/M2

## 2021-06-04 VITALS — BODY MASS INDEX: 21 KG/M2 | WEIGHT: 123 LBS | HEIGHT: 64 IN

## 2021-06-04 VITALS
HEART RATE: 109 BPM | BODY MASS INDEX: 20.94 KG/M2 | DIASTOLIC BLOOD PRESSURE: 50 MMHG | OXYGEN SATURATION: 96 % | TEMPERATURE: 98.1 F | SYSTOLIC BLOOD PRESSURE: 89 MMHG | WEIGHT: 122 LBS

## 2021-06-04 VITALS
SYSTOLIC BLOOD PRESSURE: 126 MMHG | DIASTOLIC BLOOD PRESSURE: 72 MMHG | OXYGEN SATURATION: 97 % | TEMPERATURE: 98 F | HEART RATE: 123 BPM | WEIGHT: 119.2 LBS | BODY MASS INDEX: 20.46 KG/M2

## 2021-06-04 VITALS
TEMPERATURE: 97.5 F | SYSTOLIC BLOOD PRESSURE: 150 MMHG | DIASTOLIC BLOOD PRESSURE: 68 MMHG | BODY MASS INDEX: 19.74 KG/M2 | OXYGEN SATURATION: 95 % | HEART RATE: 104 BPM | WEIGHT: 115 LBS

## 2021-06-04 VITALS
SYSTOLIC BLOOD PRESSURE: 129 MMHG | OXYGEN SATURATION: 92 % | BODY MASS INDEX: 20.44 KG/M2 | WEIGHT: 119.1 LBS | DIASTOLIC BLOOD PRESSURE: 64 MMHG | HEART RATE: 118 BPM

## 2021-06-04 VITALS — BODY MASS INDEX: 20.6 KG/M2 | WEIGHT: 120 LBS

## 2021-06-04 VITALS — BODY MASS INDEX: 20.94 KG/M2 | WEIGHT: 122 LBS

## 2021-06-04 NOTE — PROGRESS NOTES
[facility] Hematology and Oncology Progress Note    Patient: Jared Moeller  MRN: [id number]  Date of Service: 01/06/2020        Reason for Visit:    Two week delayed D15C2 maintenance Durvalumab immunotherapy      Assessment and Plan:    Cancer Staging    1. Primary cancer of right upper lobe of lung (H)    Clinical Stage IIIB (cT4, cN2, cM0)     79 y.o.     Personal history of COPD.  Quit smoking in 2015 with 46 year p/h.      07/31/19 CT chest - obstructing central right suprahilar mass and complete atelectasis right upper lobe. 8mm rounded indeterminate nodule at left lung base. Severe emphysema.    08/09/19 NM PET - 4.3 x 4.7 x 5.6 cm mass in the central right upper lobe involving the right suprahilar region and right lower paratracheal mediastinum by direct extension. Associated complete right upper lobe atelectasis. Separate FDG avid right lower paratracheal station 4 and subcarinal station 7 (SUVmax 7.2), adenopathy suspicious for arik metastases. Few prominent right anterior cardiophrenic nodes with low-level uptake are indeterminate but suspicious for additional early arik metastases. Mild areas of subpleural thickening in the posterior right chest associated with low-level but appreciable uptake. No pleural effusion. No suspicious focal uptake in the liver skeleton. Normal adrenal glands. Severe emphysema. Moderate atherosclerotic calcifications including the coronary arteries. 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 Marked prostate gland enlargement. Large left scrotal hydrocele. Moderate scattered changes in the spine.    08/14/19 FNA through the endobronchial ultrasound - showed moderately differentiated squamous cell cancer of the lung.  The mediastinal lymph nodes were also positive.    08/28/19 MR brain - No findings to suggest intracranial metastases. No evidence of an acute intracranial abnormality. Findings compatible with mild  chronic small vessel ischemic change.    08/30/19 US kidney - Both kidneys appear echogenic suggesting medical renal disease. There is mild bilateral renal atrophy.  Enlarged prostate gland.    There was concern that he may have pulmonary tuberculosis because the auramine rhodamine stain for acid-fast bacilli was positive and the aspirate from the lower paratracheal lymph node.    Kinyoun stain for AFB came back negative.      Mantoux was negative.      QuantiFERON test was negative.      Sputum AFB x3 was negative by staining and by PCR.      Cultures were negative.      Consult with  of infectious diseases.    Chemoradiation with a small chance of cure was recommended.  Considering his kidney failure, carboplatin rather than cisplatin will be used.      09/09 - 10/14/19 completed weekly carboplatin (AUC 2) + paclitaxel (50 mg/m ) chemotherapy concurrent with 30 fx / 6000 cGy EBRT.      09/11/19 - IR port-a-cath placement.    11/11/19 - began C1 maintenance Durvalumab therapy.    12/02/19 NM PET - Mixed response with significant improvement in right suprahilar lung mass and hilar and mediastinal lymphadenopathy but progression of what appear to be right pleural metastases.    12/18/19 CT (R) lung nodule biopsy - c/w adenocarcinoma.    01/06/20:    HEME1 - WBC, ANC and Plts WNL.  HgB increasing @ 11.5.    Found to be mildly iron deficient at his nephrology appointment - started on SR-iron, one daily - tolerating OK.    CMP - stable CKD.  Stable hypoalbuminemia @ 3.0.      Encouraged pushing oral fluids.    Encouraged increasing from 1 to 2 Boost protein or CIB supplement/day.    TSH - pending.    Art presents accompanied by his wife.  He was able to enjoy the holidays with his short treatment break.  He continues to note mild shortness of breath, mild fatigue and mild arthralgias and myalgias.  He has a history of PAD in his legs - used to walk a mile/day, now limited to the walking required for shopping.   He continues to use Tylenol PM for sleep.   His appetite is only fair, however his weight is quite stable with that of 6 months ago.    I reviewed [employer] results of (R) lung pathology with Art and his wife.      No mutations were identified.      PDL1 testing not done - ordered.    Mild immunotherapy side effects - will monitor.    I instructed Art to monitor his mild, stable shortness of breath.  If it would worsen, he is to let us know as there could be concern for Durvalumab induced pneumonitis.      Continue with current immunotherapy.    Nephrology appointment in January.    01/20/20 - follow up D1C3 Durvalumab therapy with labs per Treatment Plan and hopefully review PDL1 testing    2.  Chronic kidney disease stage III.      Likely a primary kidney disorder r/t the nephritis he had as a child.      Follows with Nephrology, Dr. Rubin of [facility] [place].      Quite stable.    Work-up is ongoing.      Next follow up in January 2020.     ECOG Performance:     ECOG Performance Status: 1    Distress Assessment:    Distress Assessment Score: 2    Pain:    Pain Score (Initial OR Reassessment): 5        TT > 25 minutes face to face with > 50 % in counseling and coordination of care.    Merlin Mendenhall, CNP     CC: Maria De Jesus Fregoso MD Elizabeth Cameron, MD Mulugeta Rubin, DO  __________________________________________________    Interim History:    Mr. Jared Moeller presents accompanied by his wife.  He is in good spirits and enjoyed the holidays with a two week treatment holiday.  He continues to note mild shortness of breath, mild fatigue and mild arthralgias and myalgias.  He has a history of PAD in his legs - used to walk a mile/day, now limited to the walking required for shopping.  He continues to use Tylenol PM for sleep.   His appetite is only fair, however his weight is quite stable with that of 6 months ago.  No nausea and no emesis.  No concerning chronic neck and shoulder  discomfort since bought an ergonomic pillow.  He denies concerning pain, fever or chills, visual or mentation disturbance, difficulty with bowels or urination, skin rash.    Pain Status:    Currently in Pain: Yes     Review of Systems:    Constitutional  Constitutional (WDL): Exceptions to WDL  Fatigue: Fatigue not relieved by rest - Limiting instrumental ADL  Weight Loss: to <10% from baseline, intervention not indicated(2 lb)  Neurosensory  Neurosensory (WDL): All neurosensory elements are within defined limits  Eye   Eye Disorder (WDL): Exceptions to WDL  Dry Eye: Asymptomatic, clinical or diagnostic observations only, mild symptoms relieved by lubricants(occ)  Ear  Ear Disorder (WDL): Exceptions to WDL  Tinnitus: Mild symptoms, intervention not indicated  Cardiovascular  Cardiovascular (WDL): All cardiovascular elements are within defined limits  Pulmonary  Respiratory (WDL): Exceptions to WDL  Dyspnea: Shortness of breath with minimal exertion, limiting instrumental ADL  Gastrointestinal  Gastrointestinal (WDL): Exceptions to WDL  Anorexia: Loss of appetite without alteration in eating habits  Diarrhea: Increase of <4 stools per day over baseline, mild increase in ostomy output compared to baseline  Dysgeusia: Altered taste but no change in diet  Dry Mouth: Symptomatic (e.g., dry or thick saliva) without significant dietary alteration, unstimulated saliva flow >0.2 ml/min  Genitourinary  Genitourinary (WDL): All genitourinary elements are within defined limits  Lymphatic  Lymph (WDL): All lymph disorder elements are within defined limits  Musculoskeletal and Connective Tissue  Musculoskeletal and Connetive Tissue Disorders (WDL): Exceptions to WDL  Arthralgia: Mild pain  Muscle Weakness : Symptomatic, perceived by patient but not evident on physical exam  Myalgia: Mild pain  Integumentary  Integumentary (WDL): All integumentary elements are within defined limits  Patient Coping  Patient Coping:  Open/discussion  Distress Assessment  Distress Assessment Score: 2  Accompanied by  Accompanied by: Family Member  Oral Chemo Adherence       Past History:    Past Medical History:   Diagnosis Date     BPH (benign prostatic hyperplasia)      Carotid stenosis, bilateral 04/07/2016    left 50-69%. moderate right side.     Chronic kidney disease (CKD), stage III (moderate) (H)      Colon polyps      COPD (chronic obstructive pulmonary disease) (H)      Epididymal cyst, left side. 04/07/2016     Hematuria 12/2002    Work-up was negative.     Hypertension      Inguinal hernia     Right     Nephritis     11 years old.     Peripheral vascular disease (H)      Polio     6 years of age. Right leg. Recovered fully.     Shingles 1996    left forehead.         Past Surgical History:   Procedure Laterality Date     COLONOSCOPY       CT BIOPSY LUNG  12/18/2019     CYSTOSCOPY  2002     FLEXIBLE BRONCHOSCOPY  08/14/2019    with EBUS FNA>     IR EXTREMITY ANGIOGRAM BILATERAL  04/06/2016     IR PORT PLACEMENT >5 YEARS  9/11/2019     NASAL SEPTUM SURGERY  1980s     TONSILLECTOMY  1948       Physical Exam:    Recent Vitals 1/6/2020   Height -   Weight 119 lbs 2 oz   BSA (m2) 1.56 m2   /64   Pulse 118   Temp -   Temp src -   SpO2 92   Some recent data might be hidden       GENERAL:   Very pleasant.  Alert and oriented.  Comfortable.  In no acute distress.  Thin.  Spouse accompanies.    HEENT:   Atraumatic and normocephalic.  DANYA.  EOMI.  No pallor.  No icterus. No mucosal lesions.    LYMPH NODES:  No palpable cervical, axillary or inguinal lymphadenopathy.    CHEST:   Diminished breath sounds bilaterally.    Port-a-cath in place.  No s/s infection.    CVS:    S1 and S2 aheard.  Regular rate and rhythm. No murmur, gallop or rub heard.  No peripheral edema.    ABDOMEN:   Soft.  Not tender or distended.  No palpable hepatomegaly or splenomegaly, masses or ascites.     EXTREMITIES:  Warm.    SKIN:    No rash, bruising or purpura  noted.    Lab Results:    Reviewed with patient.    Recent Results (from the past 24 hour(s))   Comprehensive Metabolic Panel   Result Value Ref Range    Sodium 139 136 - 145 mmol/L    Potassium 4.3 3.5 - 5.0 mmol/L    Chloride 106 98 - 107 mmol/L    CO2 24 22 - 31 mmol/L    Anion Gap, Calculation 9 5 - 18 mmol/L    Glucose 151 (H) 70 - 125 mg/dL    BUN 28 8 - 28 mg/dL    Creatinine 1.63 (H) 0.70 - 1.30 mg/dL    GFR MDRD Af Amer 50 (L) >60 mL/min/1.73m2    GFR MDRD Non Af Amer 41 (L) >60 mL/min/1.73m2    Bilirubin, Total 0.2 0.0 - 1.0 mg/dL    Calcium 9.6 8.5 - 10.5 mg/dL    Protein, Total 6.6 6.0 - 8.0 g/dL    Albumin 3.0 (L) 3.5 - 5.0 g/dL    Alkaline Phosphatase 75 45 - 120 U/L    AST 16 0 - 40 U/L    ALT 13 0 - 45 U/L   HM1 (CBC with Diff)   Result Value Ref Range    WBC 5.7 4.0 - 11.0 thou/uL    RBC 3.92 (L) 4.40 - 6.20 mill/uL    Hemoglobin 11.5 (L) 14.0 - 18.0 g/dL    Hematocrit 37.6 (L) 40.0 - 54.0 %    MCV 96 80 - 100 fL    MCH 29.3 27.0 - 34.0 pg    MCHC 30.6 (L) 32.0 - 36.0 g/dL    RDW 15.1 (H) 11.0 - 14.5 %    Platelets 236 140 - 440 thou/uL    MPV 9.9 8.5 - 12.5 fL    Neutrophils % 65 50 - 70 %    Lymphocytes % 18 (L) 20 - 40 %    Monocytes % 11 (H) 2 - 10 %    Eosinophils % 5 0 - 6 %    Basophils % 1 0 - 2 %    Neutrophils Absolute 3.7 2.0 - 7.7 thou/uL    Lymphocytes Absolute 1.0 0.8 - 4.4 thou/uL    Monocytes Absolute 0.6 0.0 - 0.9 thou/uL    Eosinophils Absolute 0.3 0.0 - 0.4 thou/uL    Basophils Absolute 0.1 0.0 - 0.2 thou/uL       Imaging:    No new imaging.

## 2021-06-04 NOTE — PROGRESS NOTES
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    Assessment and Plan  Cancer Staging  Primary cancer of right upper lobe of lung (H)  Staging form: Lung, AJCC 8th Edition  - Clinical stage from 8/20/2019: Stage IIIB (cT4, cN2, cM0) - Signed by Jada Lopez MD on 9/4/2019      ECOG Performance   ECOG Performance Status: 1    Distress Assessment  Distress Assessment Score: No distress: Concerns about his medical issues.  Please see the discussion below.    Pain   : No pain.    Mr. Jared Moeller is a 78 y.o. gentleman with COPD, history of smoking which he quit in 2015.  He had some old lung nodules but was found in July 2019 to have a right upper lobe lung mass, in the right suprahilar area contiguous with the mediastinum.  The mass in the PET CT scan was 5.6 x 4.7 x 4.3 cm in size and PET positive.  This involved the right suprahilar region and a right lower paratracheal mediastinum with direct extension.  There was complete collapse of the right upper lobe with involvement of the mediastinum, possibly the right pericardial region and possibly the right pleura in the right lower lobe region.  The FNA done through the endobronchial ultrasound on 8/14/2019 showed moderately differentiated squamous cell cancer of the lung.  The mediastinal lymph nodes were also positive.    He has a chronic kidney disease stage III.  I think he has some sort of primary kidney disorder.  Likely related to the nephritis that he had as a child.  He has been referred to nephrology and was seen by Dr. Rubin of [facility] [place].  Work-up is ongoing.   Our plan is for radiation along with chemotherapy with weekly carboplatin AUC of 2 and paclitaxel at 50 mg/m .  After he is done with chemotherapy and radiation we will go with Durvalumab IV given every 2  weeks.  He started chemotherapy and radiation on 9/9/2019.  He received the last day of chemotherapy on 10/14/2019 and received the last day of radiation on 10/18/2019.  He started postradiation durvalumab on 11/11/2019.    1.  He is feeling well overall.  Except for some residual shortness of breath, he appears to be doing well.  The radiation esophagitis and swallowing difficulty has completely resolved.  He is eating well.  Maintaining his weight.    2.  He has had a follow-up PET CT scan after the chemoradiation done on 12/2/2019.  Today I reviewed the report and images with the patient and his wife.  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  When we look at the previous PET CT scan from 8/9/2019 there was some faint uptake in those areas.  Most likely that her disease was there at that time and it has grown.  Of course we will have to consider the possibility that this is inflammatory of the radiation or perhaps from the treatment.  I discussed with him that the appearance is that of progression of disease to stage IV disease.  However I think we should go for a CT-guided biopsy before we conclude that.  I have discussed with the radiologist and they think that he can have a CT-guided biopsy from the right paraspinal area.  The patient voiced understanding.  I will request for a CT-guided biopsy to be done.    If he is found to have a metastatic lung cancer in these areas on the biopsy, we will need to send it for next generation sequencing etc.    3.  Meanwhile we will continue with the Durvalumab maintenance.  Blood counts from today were reviewed and hemoglobin has improved to 11.3.  WBC count and platelet count are normal.  He is recovering well from the  anemia due to chemotherapy.  Metabolic panel was also reviewed.  Creatinine is fairly stable at 1.42.  LFTs are also stable.  We will start cycle #2 of durvalumab today.    4.  The patient understands that if we find that he clearly has progression of disease, then we will need to go to more active chemotherapy.  We will make that decision based on the results of the biopsy.  I also discussed with him that if this turns out to be stage IV disease, then the lung cancer is not curable.  The mainstay of treatment will be palliative chemotherapy.  That will need to be continued indefinitely.  We discussed about life expectancy.  I discussed with him that he does not feel discouraged.  That are better and better treatment options available in our days.  He voiced understanding.    5.  I asked him to make sure that he makes a follow-up appointment with nephrology.  We will need that help if he needs even more active chemotherapy.      6.  Follow-up: He is to be scheduled in 2 weeks in 4 weeks for a Durvalumab and associated labs.  I have ordered a CT-guided biopsy.  After the biopsy is done, he will be scheduled with MD or NP when he comes for 1 of these Durvalumab infusions.    Time spend >30 minutes face to face with the patient. More than 50 % in counseling and coordination of care.    Problem List    1. Primary cancer of right upper lobe of lung (H)  CC OFFICE VISIT LONG    Infusion Appointment    CC OFFICE VISIT LONG    Infusion Appointment    CT Lung Biopsy    DISCONTINUED: sodium chloride 0.9% 250 mL infusion    DISCONTINUED: durvalumab 541 mg in sodium chloride 0.9% 250 mL (IMFINZI)    DISCONTINUED: sodium chloride flush 20 mL (NS)    DISCONTINUED: heparin lockflush (PF) porcine 300-600 Units    DISCONTINUED: diphenhydrAMINE injection 50 mg (BENADRYL)    DISCONTINUED: famotidine 20 mg/2 mL injection 20 mg (PEPCID)    DISCONTINUED: hydrocortisone sod succ (PF) injection 100 mg    DISCONTINUED: acetaminophen tablet  1,000 mg (TYLENOL)    DISCONTINUED: sodium chloride 0.9% 500 mL   2. Encounter for antineoplastic immunotherapy     3. Chronic kidney disease (CKD), stage III (moderate) (H)     4. Normochromic normocytic anemia          CC: Maria De Jesus Fregoso MD Elizabeth Cameron, MD Mulugeta Rubin, DO    ______________________________________________________________________________    History of Present Illness    Mr. Jared Moeller is here for a follow-up with his wife.    Overall he says that he is doing okay.  They are now living in an apartment in a [facility] in [facility].  He has had 2 infusions of durvalumab which he has not tolerated fairly well.    He says that he has a shortness of breath on effort.  He feels short of breath by the time he does 1 flight of stairs.  On the other hand he remains quite physically active doing shopping for the new house etc.    Patient is aware that his symptoms have resolved.  Swallowing is fine now.  He is eating well.  He is not losing weight.    No fevers.  No night sweats.  No lumps or bumps anywhere.  No unusual headaches.  No eyesight tolerance.  No mouth sores.  No swallowing difficulty.  No nausea or vomiting.  No chest pain.  No cough.  No abdominal pain.  No constipation or diarrhea.  No difficulty with urination.  No skin rashes.  No numbness or tingling.    Please see below.  A 14 point review of system is otherwise completely negative.    Pain Status  Currently in Pain: No/denies    Review of Systems    Constitutional  Constitutional (WDL): Exceptions to WDL  Fatigue: None  Fever: None  Chills: None  Weight Gain: 5 - <10% from baseline(2#)  Weight Loss: None  Neurosensory  Neurosensory (WDL): All neurosensory elements are within defined limits  Cardiovascular  Cardiovascular (WDL): All cardiovascular elements are within defined limits  Pulmonary  Respiratory (WDL): Exceptions to WDL  Cough: None  Dyspnea: Shortness of breath with moderate exertion  Hypoxia:  None  Gastrointestinal  Gastrointestinal (WDL): Exceptions to WDL  Anorexia: None  Constipation: None  Diarrhea: None  Dysphagia: None  Esophagitis: None  Nausea: None  Pharyngitis: None  Vomiting: None  Dysgeusia: Altered taste but no change in diet(improving)  Dry Mouth: None  Genitourinary  Genitourinary (WDL): All genitourinary elements are within defined limits  Integumentary  Integumentary (WDL): All integumentary elements are within defined limits  Patient Coping  Patient Coping: Open/discussion  Distress Assessment  Distress Assessment Score: No distress  Accompanied by  Accompanied by: Family Member    Past History  Past Medical History:   Diagnosis Date     BPH (benign prostatic hyperplasia)      Carotid stenosis, bilateral 04/07/2016    left 50-69%. moderate right side.     Chronic kidney disease (CKD), stage III (moderate) (H)      Colon polyps      COPD (chronic obstructive pulmonary disease) (H)      Epididymal cyst, left side. 04/07/2016     Hematuria 12/2002    Work-up was negative.     Hypertension      Inguinal hernia     Right     Nephritis     11 years old.     Peripheral vascular disease (H)      Polio     6 years of age. Right leg. Recovered fully.     Shingles 1996    left forehead.         Past Surgical History:   Procedure Laterality Date     COLONOSCOPY       CYSTOSCOPY  2002     FLEXIBLE BRONCHOSCOPY  08/14/2019    with EBUS FNA>     IR EXTREMITY ANGIOGRAM BILATERAL  04/06/2016     IR PORT PLACEMENT >5 YEARS  9/11/2019     NASAL SEPTUM SURGERY  1980s     TONSILLECTOMY  1948       Physical Exam    Recent Vitals 12/9/2019   Weight 122 lbs 3 oz   BSA (m2) 1.58 m2   /80   Pulse 104   Temp 97.5   Temp src 1   SpO2 93   Some recent data might be hidden       GENERAL: Alert and oriented to time place and person. Seated comfortably. In no distress.  Thin build.  He looks well overall.    HEAD: Atraumatic and normocephalic.    EYES: DANYA, EOMI.  No pallor.  No icterus.    Oral cavity: no  mucosal lesion or tonsillar enlargement.    NECK: supple. JVP normal.  No thyroid enlargement.    LYMPH NODES: No palpable, cervical, axillary or inguinal lymphadenopathy.    CHEST: clear to auscultation bilaterally.  Resonant to percussion throughout bilaterally.  Symmetrical breath movements bilaterally.    Port-A-Cath over the left upper chest looks unremarkable.    CVS: S1 and S2 are heard. Regular rate and rhythm.  No murmur or gallop or rub heard.  No peripheral edema.    ABDOMEN: Soft. Not tender. Not distended.  No palpable hepatomegaly or splenomegaly.  No other mass palpable.  Bowel sounds heard.    EXTREMITIES: Warm.    SKIN: no rash, or bruising or purpura.  Has a full head of hair.          Lab Results    Recent Results (from the past 24 hour(s))   Comprehensive Metabolic Panel    Collection Time: 12/09/19  8:51 AM   Result Value Ref Range    Sodium 137 136 - 145 mmol/L    Potassium 4.2 3.5 - 5.0 mmol/L    Chloride 107 98 - 107 mmol/L    CO2 21 (L) 22 - 31 mmol/L    Anion Gap, Calculation 9 5 - 18 mmol/L    Glucose 102 70 - 125 mg/dL    BUN 29 (H) 8 - 28 mg/dL    Creatinine 1.42 (H) 0.70 - 1.30 mg/dL    GFR MDRD Af Amer 58 (L) >60 mL/min/1.73m2    GFR MDRD Non Af Amer 48 (L) >60 mL/min/1.73m2    Bilirubin, Total 0.3 0.0 - 1.0 mg/dL    Calcium 9.2 8.5 - 10.5 mg/dL    Protein, Total 6.6 6.0 - 8.0 g/dL    Albumin 3.2 (L) 3.5 - 5.0 g/dL    Alkaline Phosphatase 74 45 - 120 U/L    AST 14 0 - 40 U/L    ALT 12 0 - 45 U/L   HM1 (CBC with Diff)    Collection Time: 12/09/19  8:51 AM   Result Value Ref Range    WBC 6.2 4.0 - 11.0 thou/uL    RBC 3.64 (L) 4.40 - 6.20 mill/uL    Hemoglobin 11.3 (L) 14.0 - 18.0 g/dL    Hematocrit 34.9 (L) 40.0 - 54.0 %    MCV 96 80 - 100 fL    MCH 31.0 27.0 - 34.0 pg    MCHC 32.4 32.0 - 36.0 g/dL    RDW 16.0 (H) 11.0 - 14.5 %    Platelets 180 140 - 440 thou/uL    MPV 10.0 8.5 - 12.5 fL    Neutrophils % 64 50 - 70 %    Lymphocytes % 17 (L) 20 - 40 %    Monocytes % 14 (H) 2 - 10 %     Eosinophils % 5 0 - 6 %    Basophils % 1 0 - 2 %    Neutrophils Absolute 3.9 2.0 - 7.7 thou/uL    Lymphocytes Absolute 1.0 0.8 - 4.4 thou/uL    Monocytes Absolute 0.8 0.0 - 0.9 thou/uL    Eosinophils Absolute 0.3 0.0 - 0.4 thou/uL    Basophils Absolute 0.0 0.0 - 0.2 thou/uL         Imaging    Nm Pet Ct Skull To Mid Thigh    Result Date: 12/2/2019  EXAM: NM PET CT SKULL TO MID THIGH LOCATION: [facility] DATE/TIME: 12/2/2019 12:16 PM INDICATION: Stage III cancer right upper lobe of the lung diagnosed August 2019, status post stereotactic radiotherapy and concurrent chemotherapy. Patient currently on immunotherapy. Subsequent treatment strategy. COMPARISON: PET scan 8/9/2019. TECHNIQUE: Serum glucose level 91 mg/dL. One hour post intravenous administration of 8.5 mCi F-18 FDG, PET imaging was performed from the skull base to the mid thighs utilizing attenuation correction with concurrent axial CT and PET/CT image fusion. Dose  reduction techniques were used. FINDINGS: Significant interval improvement in size and FDG activity within a right suprahilar mass with a residual mass measuring approximately 2.0 x 1.5 cm, SUVmax 5.5, previously 5.6 x 4.7 cm, SUVmax 14.9. Complete interval resolution of FDG-avid right hilar and paratracheal lymphadenopathy consistent with treatment effect. Non-FDG avid left lower lobe pulmonary nodule is stable. Interval increase in size, number, and FDG activity associated with right pleural nodularity measuring up to SUVmax 5.2, previously 3.6, is suspicious for progression of pleural metastases. FDG activity elsewhere in the body is normal. Interval placement of left Port-A-Cath which is in good position. Emphysema throughout both lungs. Degenerative disc disease throughout the spine. There is a tiny right pleural effusion. Vascular calcifications. Sigmoid diverticulosis. Prostatic enlargement. Degenerative disc disease throughout the spine.     1.  Mixed response with significant  improvement in right suprahilar lung mass and hilar and mediastinal lymphadenopathy but progression of what appear to be right pleural metastases.        Signed by: Jada Lopez MD

## 2021-06-04 NOTE — H&P (VIEW-ONLY)
[facility] Radiation Oncology Follow Up Note    Patient: Jared Moeller  MRN: [id number]  Date of Service: 12/09/2019    Assessment:     1. 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)        Cancer Staging  Primary cancer of right upper lobe of lung (H)  Staging form: Lung, AJCC 8th Edition  - Clinical stage from 8/20/2019: Stage IIIB (cT4, cN2, cM0) - Signed by Jada Lopez MD on 9/4/2019    ECOG Peformance Status  ECOG Performance Status: 1  Distress Assessment Score  Distress Assessment Score: No distress  Interventions  Distress Assessment Intervention: Provider Notified  Body site: Thorax    Impression/Plan:   78 year old male with recently diagnosed moderately differentiated invasive squamous cell carcinoma involving and collapsing the RUL, 4.3 x 4.7 x 5.6 cm, with biopsy confirmed mediastinal LN, station R4 LN, and station R10 LN involvement.   Histopy of COPD, CKD with serum creatinine improving at 1.50 and GFR 55. Finished 6000 cGy/ 30 fx on 10/18/2019.    1. PET CT on 12/3/2019 showing significant response in right suprahilar lung mass and hilar/mediastinal lymphadenopathy in treatment field. However now concern for progression in right pleural metastases which is outside of field. He has CT guided biopsy scheduled to evaluate this area in the right paraspinal area.   2. Continuing Durvalumab maintenance until biopsy results  Face to face time  25 minutes with > 75% spent on consultation, education and coordination of care.    Subjective:      HPI: Jared Moeller is a 78 y.o. male who was treated with radiation therapy for moderately differentiated invasive squamous cell carcinoma involving and collapsing the RUL, 4.3 x 4.7 x 5.6 cm, with biopsy confirmed mediastinal LN, station R4 LN, and station R10 LN involvement.     The patient originally learned of a pulmonary mass from on x-ray approximately 3-4 years ago while he was living in [place] and seeing pulmonology, at that time he was  also diagnosed with COPD. Follow up CT scan was performed and he was told this was benign appearing with further follow up recommended. He was lost to follow up for a few years until more recently a CT scan showed the RUL mass enlarging and very suspicious for lung cancer. He has returned from [place] for further follow up, during his time there he was also diagnosed with CKD with creatinines ranging from 1.5-1.9 over the past year.     CT chest w/o contrast on 7/31/2019 showed an obstructing central right suprahilar mass and complete atelectasis of the RUL, approximately 3 cm in size. Also noted was an 8mm rounded indeterminate nodule at LLL. PET CT on 8/9/2019 returned with findings suspicious for central RUL cancer, measuring 4.3 x 4.7 x 5.6 cm, with direct mediastinal extension as well as mets involving the thoracic LNs including mediastinum and possible right pericardial region and possible right pleura (FDG avid right lower paratracheal station 4 and subcarinal station 7 suspicious for arik mets). MRI head 8/28/2019 negative for intracranial metastases.      Endobronchial US guided FNA was performed on 8/14/2019, pathology returned positive for metastatic poorly differentiated squamous cell carcinoma in the sampled mediastinal LN, station R4 LN, station R10 LN, and bronchial washing and bronchus take off of the RUL. The subcarinal LN did not return with any tumor. 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      Regarding his COPD, PFT on 7/31/2019 was abnormal. Spirometry is consistent with mild obstructive ventilatory defect. Spirometry is not consistent with reversibility. There is no hyperinflation. There is air-trapping. Diffusion capacity when corrected for hemoglobin is moderately reduced.     SITE TREATED: RUL  TOTAL DOSE: 6000  NUMBER OF FRACTIONS: 30  DATES  COMPLETED: 10/18/2019  CONCURRENT CHEMOTHERAPY: Yes  ADJUVANT THERAPY:Yes     He tolerated the treatment without unexpected side effects.     The patient underwent a PET CT on 12/02/2019, this showed a mixed response with significant improvement in right suprahilar lung mass and hilar/mediastnal lymphadenopathy but progression of what appears to be right pleural metastasis.    The patient presents today for routine office visit. He has seen Dr. Lopez earlier today and has biopsy ordered for further evaluation of the pleural metastasis. Otherwise he denies any significant shortness of breath.       Current Outpatient Medications   Medication Sig Dispense Refill     acetaminophen (TYLENOL) 500 MG tablet Take 500 mg by mouth every 6 (six) hours as needed for pain.       cilostazol (PLETAL) 100 MG tablet Take 1 tablet (100 mg total) by mouth 2 (two) times a day. 180 tablet 3     diphenhydrAMINE-acetaminophen (TYLENOL PM EXTRA STRENGTH)  mg Tab Take 1 tablet by mouth at bedtime as needed.       epinephrine (EPIPEN INJ) Inject as directed as needed.       ferrous sulfate SR (SLOW FE) 142 mg (45 mg iron) TbER Take 45 mg by mouth daily with breakfast.       fexofenadine (ALLEGRA ALLERGY) 60 MG tablet Take 60 mg by mouth as needed.              Lactobacillus rhamnosus GG (CULTURELLE) 15 billion cell CpSP Take 1 capsule by mouth 2 (two) times a day with meals.       multivitamin with minerals (THERA-M) 9 mg iron-400 mcg Tab tablet Take 1 tablet by mouth daily.  0     prochlorperazine (COMPAZINE) 10 MG tablet Take 1 tablet (10 mg total) by mouth every 6 (six) hours as needed (For breakthrough nausea/vomiting). 30 tablet 1     telmisartan-hydrochlorothiazide (MICARDIS HCT) 80-12.5 mg per tablet Take 1 tablet by mouth daily. (Patient taking differently: Take 1 tablet by mouth daily. Stopped 9/30.      ) 90 tablet 3     tiotropium (SPIRIVA WITH HANDIHALER) 18 mcg inhalation capsule Place 1 capsule (2 puffs total) into  inhaler and inhale daily. (Patient taking differently: Place 18 mcg into inhaler and inhale daily. Possibly every other day      ) 90 capsule 3     No current facility-administered medications for this visit.      Facility-Administered Medications Ordered in Other Visits   Medication Dose Route Frequency Provider Last Rate Last Dose     acetaminophen tablet 1,000 mg (TYLENOL)  1,000 mg Oral PRN Jada Lopez MD         diphenhydrAMINE injection 50 mg (BENADRYL)  50 mg Intravenous PRN Jada Lopez MD         famotidine 20 mg/2 mL injection 20 mg (PEPCID)  20 mg Intravenous PRN Jada Lopez MD         hydrocortisone sod succ (PF) injection 100 mg  100 mg Intravenous PRN Jada Lopez MD         sodium chloride 0.9% 250 mL infusion   Intravenous PRN Jada Lopez MD         sodium chloride 0.9% 500 mL  500 mL Intravenous Once PRN Jada Lopez MD         sodium chloride flush 20 mL (NS)  20 mL Intravenous PRN Jada Lopez MD           The following portions of the patient's history were reviewed and updated as appropriate: allergies, current medications, past family history, past medical history, past social history, past surgical history and problem list.    Review of Systems    General  Constitutional (WDL): Exceptions to WDL  Weight Gain: 5 - <10% from baseline  EENT  Eye Disorder (WDL): Exceptions to WDL(wears reading glasses)  Dry Eye: Asymptomatic, clinical or diagnostic observations only, mild symptoms relieved by lubricants  Ear Disorder (WDL): Exceptions to WDL  Tinnitus: Mild symptoms, intervention not indicated  Respiratory       Respiratory (WDL): Exceptions to WDL  Dyspnea: Shortness of breath with moderate exertion  Cardiovascular  Cardiovascular (WDL): All cardiovascular elements are within defined limits  Endocrine     Gastrointestinal  Gastrointestinal (WDL): Exceptions to WDL  Dysgeusia: Altered taste but no change in diet(improving)  Musculoskeletal  Musculoskeletal and Connetive Tissue Disorders  (WDL): All Musculoskeletal and Connetive Tissue Disorder elements are within defined limits  Integumentary               Integumentary (WDL): All integumentary elements are within defined limits  Neurological  Neurosensory (WDL): All neurosensory elements are within defined limits  Psychological/Emotional   Patient Coping: Open/discussion  Hematological/Lymphatic  Lymph (WDL): Assessment not pertinent to visit  Dermatologic     Genitourinary/Reproductive  Genitourinary (WDL): All genitourinary elements are within defined limits  Reproductive     Pain              Currently in Pain: No/denies  AUA Assessment                                  Accompanied by  Accompanied by: Family Member      Objective:     Physical Exam    Vitals:    12/09/19 1129   BP: 139/80   Pulse: (!) 104   Temp: 97.5  F (36.4  C)   TempSrc: Oral   SpO2: 93%   Weight: 123 lb 8 oz (56 kg)        GENERAL: No acute distress. Cooperative in conversation.   HEENT: pupils are equal, round and reactive. Oromucosa moist.  RESP: Normal respiratory effort.   ABD: Soft, nontender.  MUSCULOSKELETAL: no palpable points of tenderness   NEURO: Non focal. Alert and oriented x3.   PSYCH: Within normal limits. No depression or anxiety.  SKIN: Warm dry intact.  EXTREMITIES: No edema.    Recent Labs:   Recent Results (from the past 168 hour(s))   Comprehensive Metabolic Panel   Result Value Ref Range    Sodium 137 136 - 145 mmol/L    Potassium 4.2 3.5 - 5.0 mmol/L    Chloride 107 98 - 107 mmol/L    CO2 21 (L) 22 - 31 mmol/L    Anion Gap, Calculation 9 5 - 18 mmol/L    Glucose 102 70 - 125 mg/dL    BUN 29 (H) 8 - 28 mg/dL    Creatinine 1.42 (H) 0.70 - 1.30 mg/dL    GFR MDRD Af Amer 58 (L) >60 mL/min/1.73m2    GFR MDRD Non Af Amer 48 (L) >60 mL/min/1.73m2    Bilirubin, Total 0.3 0.0 - 1.0 mg/dL    Calcium 9.2 8.5 - 10.5 mg/dL    Protein, Total 6.6 6.0 - 8.0 g/dL    Albumin 3.2 (L) 3.5 - 5.0 g/dL    Alkaline Phosphatase 74 45 - 120 U/L    AST 14 0 - 40 U/L    ALT 12 0  - 45 U/L   TSH   Result Value Ref Range    TSH 1.75 0.30 - 5.00 uIU/mL   HM1 (CBC with Diff)   Result Value Ref Range    WBC 6.2 4.0 - 11.0 thou/uL    RBC 3.64 (L) 4.40 - 6.20 mill/uL    Hemoglobin 11.3 (L) 14.0 - 18.0 g/dL    Hematocrit 34.9 (L) 40.0 - 54.0 %    MCV 96 80 - 100 fL    MCH 31.0 27.0 - 34.0 pg    MCHC 32.4 32.0 - 36.0 g/dL    RDW 16.0 (H) 11.0 - 14.5 %    Platelets 180 140 - 440 thou/uL    MPV 10.0 8.5 - 12.5 fL    Neutrophils % 64 50 - 70 %    Lymphocytes % 17 (L) 20 - 40 %    Monocytes % 14 (H) 2 - 10 %    Eosinophils % 5 0 - 6 %    Basophils % 1 0 - 2 %    Neutrophils Absolute 3.9 2.0 - 7.7 thou/uL    Lymphocytes Absolute 1.0 0.8 - 4.4 thou/uL    Monocytes Absolute 0.8 0.0 - 0.9 thou/uL    Eosinophils Absolute 0.3 0.0 - 0.4 thou/uL    Basophils Absolute 0.0 0.0 - 0.2 thou/uL       Imaging: Imaging results 30 days: Nm Pet Ct Skull To Mid Thigh    Result Date: 12/2/2019  EXAM: NM PET CT SKULL TO MID THIGH LOCATION: [facility] DATE/TIME: 12/2/2019 12:16 PM INDICATION: Stage III cancer right upper lobe of the lung diagnosed August 2019, status post stereotactic radiotherapy and concurrent chemotherapy. Patient currently on immunotherapy. Subsequent treatment strategy. COMPARISON: PET scan 8/9/2019. TECHNIQUE: Serum glucose level 91 mg/dL. One hour post intravenous administration of 8.5 mCi F-18 FDG, PET imaging was performed from the skull base to the mid thighs utilizing attenuation correction with concurrent axial CT and PET/CT image fusion. Dose  reduction techniques were used. FINDINGS: Significant interval improvement in size and FDG activity within a right suprahilar mass with a residual mass measuring approximately 2.0 x 1.5 cm, SUVmax 5.5, previously 5.6 x 4.7 cm, SUVmax 14.9. Complete interval resolution of FDG-avid right hilar and paratracheal lymphadenopathy consistent with treatment effect. Non-FDG avid left lower lobe pulmonary nodule is stable. Interval increase in size, number,  and FDG activity associated with right pleural nodularity measuring up to SUVmax 5.2, previously 3.6, is suspicious for progression of pleural metastases. FDG activity elsewhere in the body is normal. Interval placement of left Port-A-Cath which is in good position. Emphysema throughout both lungs. Degenerative disc disease throughout the spine. There is a tiny right pleural effusion. Vascular calcifications. Sigmoid diverticulosis. Prostatic enlargement. Degenerative disc disease throughout the spine.     1.  Mixed response with significant improvement in right suprahilar lung mass and hilar and mediastinal lymphadenopathy but progression of what appear to be right pleural metastases.      I, Loren Denny MD personally performed the services described in this documentation, as scribed by Kash Carreon in my presence, and it is both accurate and complete.    Signed by: Loren Denny MD, MPH

## 2021-06-04 NOTE — PROGRESS NOTES
Patient here ambulatory for follow up s/p radiation for his lung cancer.  Patient seen in medical oncology today and he states he had changes on his PET scan.  A biopsy has been scheduled for next week.  Seen by Dr. Denny.  Plan RTC for follow up as directed by physician.

## 2021-06-04 NOTE — PATIENT INSTRUCTIONS - HE
Recent Results (from the past 24 hour(s))   Comprehensive Metabolic Panel   Result Value Ref Range    Sodium 139 136 - 145 mmol/L    Potassium 4.3 3.5 - 5.0 mmol/L    Chloride 106 98 - 107 mmol/L    CO2 24 22 - 31 mmol/L    Anion Gap, Calculation 9 5 - 18 mmol/L    Glucose 151 (H) 70 - 125 mg/dL    BUN 28 8 - 28 mg/dL    Creatinine 1.63 (H) 0.70 - 1.30 mg/dL    GFR MDRD Af Amer 50 (L) >60 mL/min/1.73m2    GFR MDRD Non Af Amer 41 (L) >60 mL/min/1.73m2    Bilirubin, Total 0.2 0.0 - 1.0 mg/dL    Calcium 9.6 8.5 - 10.5 mg/dL    Protein, Total 6.6 6.0 - 8.0 g/dL    Albumin 3.0 (L) 3.5 - 5.0 g/dL    Alkaline Phosphatase 75 45 - 120 U/L    AST 16 0 - 40 U/L    ALT 13 0 - 45 U/L   HM1 (CBC with Diff)   Result Value Ref Range    WBC 5.7 4.0 - 11.0 thou/uL    RBC 3.92 (L) 4.40 - 6.20 mill/uL    Hemoglobin 11.5 (L) 14.0 - 18.0 g/dL    Hematocrit 37.6 (L) 40.0 - 54.0 %    MCV 96 80 - 100 fL    MCH 29.3 27.0 - 34.0 pg    MCHC 30.6 (L) 32.0 - 36.0 g/dL    RDW 15.1 (H) 11.0 - 14.5 %    Platelets 236 140 - 440 thou/uL    MPV 9.9 8.5 - 12.5 fL    Neutrophils % 65 50 - 70 %    Lymphocytes % 18 (L) 20 - 40 %    Monocytes % 11 (H) 2 - 10 %    Eosinophils % 5 0 - 6 %    Basophils % 1 0 - 2 %    Neutrophils Absolute 3.7 2.0 - 7.7 thou/uL    Lymphocytes Absolute 1.0 0.8 - 4.4 thou/uL    Monocytes Absolute 0.6 0.0 - 0.9 thou/uL    Eosinophils Absolute 0.3 0.0 - 0.4 thou/uL    Basophils Absolute 0.1 0.0 - 0.2 thou/uL

## 2021-06-04 NOTE — PATIENT INSTRUCTIONS - HE
Recent Results (from the past 24 hour(s))   Comprehensive Metabolic Panel   Result Value Ref Range    Sodium 140 136 - 145 mmol/L    Potassium 4.6 3.5 - 5.0 mmol/L    Chloride 107 98 - 107 mmol/L    CO2 26 22 - 31 mmol/L    Anion Gap, Calculation 7 5 - 18 mmol/L    Glucose 114 70 - 125 mg/dL    BUN 27 8 - 28 mg/dL    Creatinine 1.79 (H) 0.70 - 1.30 mg/dL    GFR MDRD Af Amer 45 (L) >60 mL/min/1.73m2    GFR MDRD Non Af Amer 37 (L) >60 mL/min/1.73m2    Bilirubin, Total 0.3 0.0 - 1.0 mg/dL    Calcium 9.2 8.5 - 10.5 mg/dL    Protein, Total 6.5 6.0 - 8.0 g/dL    Albumin 3.0 (L) 3.5 - 5.0 g/dL    Alkaline Phosphatase 70 45 - 120 U/L    AST 14 0 - 40 U/L    ALT 12 0 - 45 U/L   TSH   Result Value Ref Range    TSH 1.25 0.30 - 5.00 uIU/mL   HM1 (CBC with Diff)   Result Value Ref Range    WBC 5.0 4.0 - 11.0 thou/uL    RBC 3.52 (L) 4.40 - 6.20 mill/uL    Hemoglobin 10.5 (L) 14.0 - 18.0 g/dL    Hematocrit 33.7 (L) 40.0 - 54.0 %    MCV 96 80 - 100 fL    MCH 29.8 27.0 - 34.0 pg    MCHC 31.2 (L) 32.0 - 36.0 g/dL    RDW 15.3 (H) 11.0 - 14.5 %    Platelets 212 140 - 440 thou/uL    MPV 9.9 8.5 - 12.5 fL    Neutrophils % 64 50 - 70 %    Lymphocytes % 18 (L) 20 - 40 %    Monocytes % 11 (H) 2 - 10 %    Eosinophils % 6 0 - 6 %    Basophils % 0 0 - 2 %    Neutrophils Absolute 3.2 2.0 - 7.7 thou/uL    Lymphocytes Absolute 0.9 0.8 - 4.4 thou/uL    Monocytes Absolute 0.6 0.0 - 0.9 thou/uL    Eosinophils Absolute 0.3 0.0 - 0.4 thou/uL    Basophils Absolute 0.0 0.0 - 0.2 thou/uL

## 2021-06-04 NOTE — PROGRESS NOTES
[facility] Radiation Oncology Follow Up Note    Patient: Jared Moeller  MRN: [id number]  Date of Service: 12/09/2019    Assessment:     1. 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)        Cancer Staging  Primary cancer of right upper lobe of lung (H)  Staging form: Lung, AJCC 8th Edition  - Clinical stage from 8/20/2019: Stage IIIB (cT4, cN2, cM0) - Signed by Jada Lopez MD on 9/4/2019    ECOG Peformance Status  ECOG Performance Status: 1  Distress Assessment Score  Distress Assessment Score: No distress  Interventions  Distress Assessment Intervention: Provider Notified  Body site: Thorax    Impression/Plan:   78 year old male with recently diagnosed moderately differentiated invasive squamous cell carcinoma involving and collapsing the RUL, 4.3 x 4.7 x 5.6 cm, with biopsy confirmed mediastinal LN, station R4 LN, and station R10 LN involvement.   Histopy of COPD, CKD with serum creatinine improving at 1.50 and GFR 55. Finished 6000 cGy/ 30 fx on 10/18/2019.    1. PET CT on 12/3/2019 showing significant response in right suprahilar lung mass and hilar/mediastinal lymphadenopathy in treatment field. However now concern for progression in right pleural metastases which is outside of field. He has CT guided biopsy scheduled to evaluate this area in the right paraspinal area.   2. Continuing Durvalumab maintenance until biopsy results  Face to face time  25 minutes with > 75% spent on consultation, education and coordination of care.    Subjective:      HPI: Jared Moeller is a 78 y.o. male who was treated with radiation therapy for moderately differentiated invasive squamous cell carcinoma involving and collapsing the RUL, 4.3 x 4.7 x 5.6 cm, with biopsy confirmed mediastinal LN, station R4 LN, and station R10 LN involvement.     The patient originally learned of a pulmonary mass from on x-ray approximately 3-4 years ago while he was living in [place] and seeing pulmonology, at that time he was  also diagnosed with COPD. Follow up CT scan was performed and he was told this was benign appearing with further follow up recommended. He was lost to follow up for a few years until more recently a CT scan showed the RUL mass enlarging and very suspicious for lung cancer. He has returned from [place] for further follow up, during his time there he was also diagnosed with CKD with creatinines ranging from 1.5-1.9 over the past year.     CT chest w/o contrast on 7/31/2019 showed an obstructing central right suprahilar mass and complete atelectasis of the RUL, approximately 3 cm in size. Also noted was an 8mm rounded indeterminate nodule at LLL. PET CT on 8/9/2019 returned with findings suspicious for central RUL cancer, measuring 4.3 x 4.7 x 5.6 cm, with direct mediastinal extension as well as mets involving the thoracic LNs including mediastinum and possible right pericardial region and possible right pleura (FDG avid right lower paratracheal station 4 and subcarinal station 7 suspicious for arik mets). MRI head 8/28/2019 negative for intracranial metastases.      Endobronchial US guided FNA was performed on 8/14/2019, pathology returned positive for metastatic poorly differentiated squamous cell carcinoma in the sampled mediastinal LN, station R4 LN, station R10 LN, and bronchial washing and bronchus take off of the RUL. The subcarinal LN did not return with any tumor. 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      Regarding his COPD, PFT on 7/31/2019 was abnormal. Spirometry is consistent with mild obstructive ventilatory defect. Spirometry is not consistent with reversibility. There is no hyperinflation. There is air-trapping. Diffusion capacity when corrected for hemoglobin is moderately reduced.     SITE TREATED: RUL  TOTAL DOSE: 6000  NUMBER OF FRACTIONS: 30  DATES  COMPLETED: 10/18/2019  CONCURRENT CHEMOTHERAPY: Yes  ADJUVANT THERAPY:Yes     He tolerated the treatment without unexpected side effects.     The patient underwent a PET CT on 12/02/2019, this showed a mixed response with significant improvement in right suprahilar lung mass and hilar/mediastnal lymphadenopathy but progression of what appears to be right pleural metastasis.    The patient presents today for routine office visit. He has seen Dr. Lopez earlier today and has biopsy ordered for further evaluation of the pleural metastasis. Otherwise he denies any significant shortness of breath.       Current Outpatient Medications   Medication Sig Dispense Refill     acetaminophen (TYLENOL) 500 MG tablet Take 500 mg by mouth every 6 (six) hours as needed for pain.       cilostazol (PLETAL) 100 MG tablet Take 1 tablet (100 mg total) by mouth 2 (two) times a day. 180 tablet 3     diphenhydrAMINE-acetaminophen (TYLENOL PM EXTRA STRENGTH)  mg Tab Take 1 tablet by mouth at bedtime as needed.       epinephrine (EPIPEN INJ) Inject as directed as needed.       ferrous sulfate SR (SLOW FE) 142 mg (45 mg iron) TbER Take 45 mg by mouth daily with breakfast.       fexofenadine (ALLEGRA ALLERGY) 60 MG tablet Take 60 mg by mouth as needed.              Lactobacillus rhamnosus GG (CULTURELLE) 15 billion cell CpSP Take 1 capsule by mouth 2 (two) times a day with meals.       multivitamin with minerals (THERA-M) 9 mg iron-400 mcg Tab tablet Take 1 tablet by mouth daily.  0     prochlorperazine (COMPAZINE) 10 MG tablet Take 1 tablet (10 mg total) by mouth every 6 (six) hours as needed (For breakthrough nausea/vomiting). 30 tablet 1     telmisartan-hydrochlorothiazide (MICARDIS HCT) 80-12.5 mg per tablet Take 1 tablet by mouth daily. (Patient taking differently: Take 1 tablet by mouth daily. Stopped 9/30.      ) 90 tablet 3     tiotropium (SPIRIVA WITH HANDIHALER) 18 mcg inhalation capsule Place 1 capsule (2 puffs total) into  inhaler and inhale daily. (Patient taking differently: Place 18 mcg into inhaler and inhale daily. Possibly every other day      ) 90 capsule 3     No current facility-administered medications for this visit.      Facility-Administered Medications Ordered in Other Visits   Medication Dose Route Frequency Provider Last Rate Last Dose     acetaminophen tablet 1,000 mg (TYLENOL)  1,000 mg Oral PRN Jada Lopez MD         diphenhydrAMINE injection 50 mg (BENADRYL)  50 mg Intravenous PRN Jada Lopez MD         famotidine 20 mg/2 mL injection 20 mg (PEPCID)  20 mg Intravenous PRN Jada Lopez MD         hydrocortisone sod succ (PF) injection 100 mg  100 mg Intravenous PRN Jada Lopez MD         sodium chloride 0.9% 250 mL infusion   Intravenous PRN Jada Lopez MD         sodium chloride 0.9% 500 mL  500 mL Intravenous Once PRN Jada Lopez MD         sodium chloride flush 20 mL (NS)  20 mL Intravenous PRN Jada Lopez MD           The following portions of the patient's history were reviewed and updated as appropriate: allergies, current medications, past family history, past medical history, past social history, past surgical history and problem list.    Review of Systems    General  Constitutional (WDL): Exceptions to WDL  Weight Gain: 5 - <10% from baseline  EENT  Eye Disorder (WDL): Exceptions to WDL(wears reading glasses)  Dry Eye: Asymptomatic, clinical or diagnostic observations only, mild symptoms relieved by lubricants  Ear Disorder (WDL): Exceptions to WDL  Tinnitus: Mild symptoms, intervention not indicated  Respiratory       Respiratory (WDL): Exceptions to WDL  Dyspnea: Shortness of breath with moderate exertion  Cardiovascular  Cardiovascular (WDL): All cardiovascular elements are within defined limits  Endocrine     Gastrointestinal  Gastrointestinal (WDL): Exceptions to WDL  Dysgeusia: Altered taste but no change in diet(improving)  Musculoskeletal  Musculoskeletal and Connetive Tissue Disorders  (WDL): All Musculoskeletal and Connetive Tissue Disorder elements are within defined limits  Integumentary               Integumentary (WDL): All integumentary elements are within defined limits  Neurological  Neurosensory (WDL): All neurosensory elements are within defined limits  Psychological/Emotional   Patient Coping: Open/discussion  Hematological/Lymphatic  Lymph (WDL): Assessment not pertinent to visit  Dermatologic     Genitourinary/Reproductive  Genitourinary (WDL): All genitourinary elements are within defined limits  Reproductive     Pain              Currently in Pain: No/denies  AUA Assessment                                  Accompanied by  Accompanied by: Family Member      Objective:     Physical Exam    Vitals:    12/09/19 1129   BP: 139/80   Pulse: (!) 104   Temp: 97.5  F (36.4  C)   TempSrc: Oral   SpO2: 93%   Weight: 123 lb 8 oz (56 kg)        GENERAL: No acute distress. Cooperative in conversation.   HEENT: pupils are equal, round and reactive. [place]romucosa moist.  RESP: Normal respiratory effort.   ABD: Soft, nontender.  MUSCULOSKELETAL: no palpable points of tenderness   NEURO: Non focal. Alert and oriented x3.   PSYCH: Within normal limits. No depression or anxiety.  SKIN: Warm dry intact.  EXTREMITIES: No edema.    Recent Labs:   Recent Results (from the past 168 hour(s))   Comprehensive Metabolic Panel   Result Value Ref Range    Sodium 137 136 - 145 mmol/L    Potassium 4.2 3.5 - 5.0 mmol/L    Chloride 107 98 - 107 mmol/L    CO2 21 (L) 22 - 31 mmol/L    Anion Gap, Calculation 9 5 - 18 mmol/L    Glucose 102 70 - 125 mg/dL    BUN 29 (H) 8 - 28 mg/dL    Creatinine 1.42 (H) 0.70 - 1.30 mg/dL    GFR MDRD Af Amer 58 (L) >60 mL/min/1.73m2    GFR MDRD Non Af Amer 48 (L) >60 mL/min/1.73m2    Bilirubin, Total 0.3 0.0 - 1.0 mg/dL    Calcium 9.2 8.5 - 10.5 mg/dL    Protein, Total 6.6 6.0 - 8.0 g/dL    Albumin 3.2 (L) 3.5 - 5.0 g/dL    Alkaline Phosphatase 74 45 - 120 U/L    AST 14 0 - 40 U/L    ALT 12 0  - 45 U/L   TSH   Result Value Ref Range    TSH 1.75 0.30 - 5.00 uIU/mL   HM1 (CBC with Diff)   Result Value Ref Range    WBC 6.2 4.0 - 11.0 thou/uL    RBC 3.64 (L) 4.40 - 6.20 mill/uL    Hemoglobin 11.3 (L) 14.0 - 18.0 g/dL    Hematocrit 34.9 (L) 40.0 - 54.0 %    MCV 96 80 - 100 fL    MCH 31.0 27.0 - 34.0 pg    MCHC 32.4 32.0 - 36.0 g/dL    RDW 16.0 (H) 11.0 - 14.5 %    Platelets 180 140 - 440 thou/uL    MPV 10.0 8.5 - 12.5 fL    Neutrophils % 64 50 - 70 %    Lymphocytes % 17 (L) 20 - 40 %    Monocytes % 14 (H) 2 - 10 %    Eosinophils % 5 0 - 6 %    Basophils % 1 0 - 2 %    Neutrophils Absolute 3.9 2.0 - 7.7 thou/uL    Lymphocytes Absolute 1.0 0.8 - 4.4 thou/uL    Monocytes Absolute 0.8 0.0 - 0.9 thou/uL    Eosinophils Absolute 0.3 0.0 - 0.4 thou/uL    Basophils Absolute 0.0 0.0 - 0.2 thou/uL       Imaging: Imaging results 30 days: Nm Pet Ct Skull To Mid Thigh    Result Date: 12/2/2019  EXAM: NM PET CT SKULL TO MID THIGH LOCATION: [facility] DATE/TIME: 12/2/2019 12:16 PM INDICATION: Stage III cancer right upper lobe of the lung diagnosed August 2019, status post stereotactic radiotherapy and concurrent chemotherapy. Patient currently on immunotherapy. Subsequent treatment strategy. COMPARISON: PET scan 8/9/2019. TECHNIQUE: Serum glucose level 91 mg/dL. One hour post intravenous administration of 8.5 mCi F-18 FDG, PET imaging was performed from the skull base to the mid thighs utilizing attenuation correction with concurrent axial CT and PET/CT image fusion. Dose  reduction techniques were used. FINDINGS: Significant interval improvement in size and FDG activity within a right suprahilar mass with a residual mass measuring approximately 2.0 x 1.5 cm, SUVmax 5.5, previously 5.6 x 4.7 cm, SUVmax 14.9. Complete interval resolution of FDG-avid right hilar and paratracheal lymphadenopathy consistent with treatment effect. Non-FDG avid left lower lobe pulmonary nodule is stable. Interval increase in size, number,  and FDG activity associated with right pleural nodularity measuring up to SUVmax 5.2, previously 3.6, is suspicious for progression of pleural metastases. FDG activity elsewhere in the body is normal. Interval placement of left Port-A-Cath which is in good position. Emphysema throughout both lungs. Degenerative disc disease throughout the spine. There is a tiny right pleural effusion. Vascular calcifications. Sigmoid diverticulosis. Prostatic enlargement. Degenerative disc disease throughout the spine.     1.  Mixed response with significant improvement in right suprahilar lung mass and hilar and mediastinal lymphadenopathy but progression of what appear to be right pleural metastases.      I, Loren Denny MD personally performed the services described in this documentation, as scribed by Kash Carreon in my presence, and it is both accurate and complete.    Signed by: Loren Denny MD, MPH

## 2021-06-04 NOTE — PROGRESS NOTES
[facility] Hematology and Oncology Progress Note    Patient: Jared Moeller  MRN: [id number]  Date of Service: 12/23/2019        Reason for Visit:    D15C2 maintenance Durvalumab immunotherapy      Assessment and Plan:    Cancer Staging    1. Primary cancer of right upper lobe of lung (H)    Clinical Stage IIIB (cT4, cN2, cM0)     79 y.o.     Personal history of COPD.  Quit smoking in 2015 with 46 year p/h.      07/31/19 CT chest - obstructing central right suprahilar mass and complete atelectasis right upper lobe. 8mm rounded indeterminate nodule at left lung base. Severe emphysema.    08/09/19 NM PET - 4.3 x 4.7 x 5.6 cm mass in the central right upper lobe involving the right suprahilar region and right lower paratracheal mediastinum by direct extension. Associated complete right upper lobe atelectasis. Separate FDG avid right lower paratracheal station 4 and subcarinal station 7 (SUVmax 7.2), adenopathy suspicious for arik metastases. Few prominent right anterior cardiophrenic nodes with low-level uptake are indeterminate but suspicious for additional early arik metastases. Mild areas of subpleural thickening in the posterior right chest associated with low-level but appreciable uptake. No pleural effusion. No suspicious focal uptake in the liver skeleton. Normal adrenal glands. Severe emphysema. Moderate atherosclerotic calcifications including the coronary arteries. 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 Marked prostate gland enlargement. Large left scrotal hydrocele. Moderate scattered changes in the spine.    08/14/19 FNA through the endobronchial ultrasound - showed moderately differentiated squamous cell cancer of the lung.  The mediastinal lymph nodes were also positive.    08/28/19 MR brain - No findings to suggest intracranial metastases. No evidence of an acute intracranial abnormality. Findings compatible with mild chronic small  vessel ischemic change.    08/30/19 US kidney - Both kidneys appear echogenic suggesting medical renal disease. There is mild bilateral renal atrophy.  Enlarged prostate gland.    There was concern that he may have pulmonary tuberculosis because the auramine rhodamine stain for acid-fast bacilli was positive and the aspirate from the lower paratracheal lymph node.    Kinyoun stain for AFB came back negative.      Mantoux was negative.      QuantiFERON test was negative.      Sputum AFB x3 was negative by staining and by PCR.      Cultures were negative.      Consult with  of infectious diseases.    Chemoradiation with a small chance of cure was recommended.  Considering his kidney failure, carboplatin rather than cisplatin will be used.      09/09 - 10/14/19 completed weekly carboplatin (AUC 2) + paclitaxel (50 mg/m ) chemotherapy concurrent with 30 fx / 6000 cGy EBRT.      09/11/19 - IR port-a-cath placement.    11/11/19 - began C1 maintenance Durvalumab therapy.    12/02/19 NM PET - Mixed response with significant improvement in right suprahilar lung mass and hilar and mediastinal lymphadenopathy but progression of what appear to be right pleural metastases.    12/18/19 CT (R) lung nodule biopsy - c/w adenocarcinoma.    12/23/19:    HEME1 - WBC, ANC and Plts WNL.  HgB slowly increasing @ 9.7.    Neutropenic precautions reviewed.    Found to be mildly iron deficient at his nephrology appointment - started on SR-iron, one daily - tolerating OK.    CMP - improved CKD.  Stable hypoalbuminemia @ 3.2.      Encouraged pushing oral fluids.    Encouraged 1 Boost protein or CIB supplement/day.    TSH - WNL.    Art presents accompanied by his wife.  He's been feeling a bit more short of breath the past week or so.  He's also noted mild fatigue and occasional diarrhea.  No nausea.  He wants to delay treatment to feel as good as he can for the holidays.  He has a history of PAD in his legs - used to walk a mile/day,  now limited to the walking required for shopping.  Occasional Tylenol PM for sleep.   Appetite fair and weight quite stable.    I reviewed recent (R) lung pathology with Art and his wife.      His prior (R)UL biopsy showed squamous cell cancer, the current (R) lung biopsy shows WILL.      I told him tissue from his recent biopsy has been sent for [employer] testing to delineate appropriate chemotherapy.      In the interim, we would want to continue with current immunotherapy.    With not feeling up to par the past week, he is adamant about not being treated today, hoping to feel good over the holidays.    I instructed him to monitor his shortness of breath and if it would worsen in the interim he is to let us know as there could be concern for Durvalumab induced pneumonitis.  He assured me that he would.     Nephrology appointment in January.    01/06/20 - follow up 2-week delayed D15C2 Durvalumab therapy with labs per Treatment Plan and hopefully review of [employer] testing    2.  Chronic kidney disease stage III.      Likely a primary kidney disorder r/t the nephritis he had as a child.      Follows with Nephrology, Dr. Rubin of [facility] [place].      Quite stable.    Work-up is ongoing.      Next follow up in January 2020.     ECOG Performance:     ECOG Performance Status: 1    Distress Assessment:    Distress Assessment Score: 1    Pain:    Pain Score (Initial OR Reassessment): No/Denies Pain        TT > 25 minutes face to face with > 50 % in counseling and coordination of care.    Merlin Mendenhall, CNP     CC: Maria De Jesus Fregoso MD Elizabeth Cameron, MD Andrew Hipp, DO  __________________________________________________    Interim History:    Mr. Jared Moeller presents accompanied by his wife in good spirits.  He's been feeling a bit more short of breath the past week or so, especially when pushing himself.  He's also noted mild fatigue and occasional diarrhea.  He wants to delay  treatment to feel as good as he can for the holidays.  His appetite is fair and his weight quite stable.  No nausea and no emesis.  No concerning chronic neck and shoulder discomfort since bought an ergonomic pillow.  He has a history of PAD in his legs - used to walk a mile/day, now limited to the walking required for shopping.  Occasional Tylenol PM for sleep.  He denies concerning pain, fever or chills, visual or mentation disturbance, difficulty with urination, skin rash.    Pain Status:    Currently in Pain: No/denies     Review of Systems:    Constitutional  Constitutional (WDL): Exceptions to WDL  Fatigue: Fatigue not relieved by rest - Limiting instrumental ADL(increased)  Fever: None  Chills: None  Weight Gain: None  Weight Loss: to <10% from baseline, intervention not indicated(2# 12/18/19)  Neurosensory  Neurosensory (WDL): All neurosensory elements are within defined limits  Eye   Eye Disorder (WDL): Exceptions to WDL  Blurred Vision: None  Dry Eye: Asymptomatic, clinical or diagnostic observations only, mild symptoms relieved by lubricants(occ, drops prn)  Eye Pain: None  Watering Eyes: None  Ear  Ear Disorder (WDL): Exceptions to WDL  Ear Pain: None  Tinnitus: Mild symptoms, intervention not indicated(chronic, constant)  Cardiovascular  Cardiovascular (WDL): All cardiovascular elements are within defined limits  Pulmonary  Respiratory (WDL): Exceptions to WDL  Cough: None  Dyspnea: Shortness of breath with minimal exertion, limiting instrumental ADL  Hypoxia: None  Gastrointestinal  Gastrointestinal (WDL): Exceptions to WDL  Anorexia: None  Constipation: None  Diarrhea: Increase of <4 stools per day over baseline, mild increase in ostomy output compared to baseline(2-3 day/loose to watery)  Dysphagia: None  Esophagitis: None  Nausea: None  Pharyngitis: None  Vomiting: None  Dysgeusia: Altered taste but no change in diet  Dry Mouth: Symptomatic (e.g., dry or thick saliva) without significant dietary  alteration, unstimulated saliva flow >0.2 ml/min(occ)  Genitourinary  Genitourinary (WDL): All genitourinary elements are within defined limits  Lymphatic  Lymph (WDL): All lymph disorder elements are within defined limits  Musculoskeletal and Connective Tissue  Musculoskeletal and Connetive Tissue Disorders (WDL): Exceptions to WDL  Arthralgia: Mild pain(mid back)  Bone Pain: None  Muscle Weakness : Symptomatic, perceived by patient but not evident on physical exam  Myalgia: Mild pain(mid back)  Integumentary  Integumentary (WDL): All integumentary elements are within defined limits  Patient Coping  Patient Coping: Accepting;Open/discussion  Distress Assessment  Distress Assessment Score: 1  Accompanied by  Accompanied by: Family Member  Oral Chemo Adherence       Past History:    Past Medical History:   Diagnosis Date     BPH (benign prostatic hyperplasia)      Carotid stenosis, bilateral 04/07/2016    left 50-69%. moderate right side.     Chronic kidney disease (CKD), stage III (moderate) (H)      Colon polyps      COPD (chronic obstructive pulmonary disease) (H)      Epididymal cyst, left side. 04/07/2016     Hematuria 12/2002    Work-up was negative.     Hypertension      Inguinal hernia     Right     Nephritis     11 years old.     Peripheral vascular disease (H)      Polio     6 years of age. Right leg. Recovered fully.     Shingles 1996    left forehead.         Past Surgical History:   Procedure Laterality Date     COLONOSCOPY       CT BIOPSY LUNG  12/18/2019     CYSTOSCOPY  2002     FLEXIBLE BRONCHOSCOPY  08/14/2019    with EBUS FNA>     IR EXTREMITY ANGIOGRAM BILATERAL  04/06/2016     IR PORT PLACEMENT >5 YEARS  9/11/2019     NASAL SEPTUM SURGERY  1980s     TONSILLECTOMY  1948       Physical Exam:    Recent Vitals 12/23/2019   Height -   Weight 121 lbs   BSA (m2) 1.57 m2   /72   Pulse 97   Temp 98   Temp src 1   SpO2 93   Some recent data might be hidden       GENERAL:   Pleasant.  Alert and  oriented.  Comfortable.  In no acute distress.  Thin build.  Spouse accompanies.    HEENT:   Atraumatic and normocephalic.  DANYA.  EOMI.  No pallor.  No icterus. No mucosal lesions.    LYMPH NODES:  No palpable cervical, axillary or inguinal lymphadenopathy.    CHEST:   Diminished breath sounds bilaterally.    Port-a-cath in place.  No s/s infection.    CVS:    S1 and S2 aheard.  Regular rate and rhythm. No murmur, gallop or rub heard.  No peripheral edema.    ABDOMEN:   Soft.  Not tender.  Not distended.  No palpable hepatomegaly or splenomegaly, masses or ascites.     EXTREMITIES:  Warm.    SKIN:    No rash, bruising or purpura noted.    Lab Results:    Reviewed with patient.    Recent Results (from the past 24 hour(s))   Comprehensive Metabolic Panel   Result Value Ref Range    Sodium 140 136 - 145 mmol/L    Potassium 4.6 3.5 - 5.0 mmol/L    Chloride 107 98 - 107 mmol/L    CO2 26 22 - 31 mmol/L    Anion Gap, Calculation 7 5 - 18 mmol/L    Glucose 114 70 - 125 mg/dL    BUN 27 8 - 28 mg/dL    Creatinine 1.79 (H) 0.70 - 1.30 mg/dL    GFR MDRD Af Amer 45 (L) >60 mL/min/1.73m2    GFR MDRD Non Af Amer 37 (L) >60 mL/min/1.73m2    Bilirubin, Total 0.3 0.0 - 1.0 mg/dL    Calcium 9.2 8.5 - 10.5 mg/dL    Protein, Total 6.5 6.0 - 8.0 g/dL    Albumin 3.0 (L) 3.5 - 5.0 g/dL    Alkaline Phosphatase 70 45 - 120 U/L    AST 14 0 - 40 U/L    ALT 12 0 - 45 U/L   HM1 (CBC with Diff)   Result Value Ref Range    WBC 5.0 4.0 - 11.0 thou/uL    RBC 3.52 (L) 4.40 - 6.20 mill/uL    Hemoglobin 10.5 (L) 14.0 - 18.0 g/dL    Hematocrit 33.7 (L) 40.0 - 54.0 %    MCV 96 80 - 100 fL    MCH 29.8 27.0 - 34.0 pg    MCHC 31.2 (L) 32.0 - 36.0 g/dL    RDW 15.3 (H) 11.0 - 14.5 %    Platelets 212 140 - 440 thou/uL    MPV 9.9 8.5 - 12.5 fL    Neutrophils % 64 50 - 70 %    Lymphocytes % 18 (L) 20 - 40 %    Monocytes % 11 (H) 2 - 10 %    Eosinophils % 6 0 - 6 %    Basophils % 0 0 - 2 %    Neutrophils Absolute 3.2 2.0 - 7.7 thou/uL    Lymphocytes Absolute  0.9 0.8 - 4.4 thou/uL    Monocytes Absolute 0.6 0.0 - 0.9 thou/uL    Eosinophils Absolute 0.3 0.0 - 0.4 thou/uL    Basophils Absolute 0.0 0.0 - 0.2 thou/uL     Imaging:    No new imaging.

## 2021-06-04 NOTE — PROGRESS NOTES
Patient is here for one month follow-up of lung cancer. Had NM PET scan. Due D1C2 durvalumab pending labs/OV with Dr. Lopez.  Accompanied by wife, Pat.   Laura Matos RN

## 2021-06-04 NOTE — PRE-PROCEDURE
Procedure Name: CT Guided right plerual BX  Date/Time: 12/18/2019 11:33 AM    Verbal consent obtained?: Yes  Written consent obtained?: Yes  Risks and benefits: Risks, benefits and alternatives were discussed  Consent given by: patient  Expected level of sedation: moderate  ASA Class: Class 3- Severe systemic disease, definite functional limitations  Mallampati: Grade 2- soft palate, base of uvula, tonsillar pillars, and portion of posterior pharyngeal wall visible  Patient states understanding of procedure being performed: Yes  Patient's understanding of procedure matches consent: Yes  Procedure consent matches procedure scheduled: Yes  Appropriately NPO: yes  Lungs: crackles right base and crackles left base  Heart: normal heart sounds and rate  History & Physical reviewed: History and physical reviewed and no updates needed  Statement of review: I have reviewed the lab findings, diagnostic data, medications, and the plan for sedation

## 2021-06-05 VITALS
SYSTOLIC BLOOD PRESSURE: 158 MMHG | OXYGEN SATURATION: 91 % | WEIGHT: 125.2 LBS | HEART RATE: 102 BPM | TEMPERATURE: 97.4 F | BODY MASS INDEX: 21.49 KG/M2 | DIASTOLIC BLOOD PRESSURE: 76 MMHG

## 2021-06-05 VITALS — BODY MASS INDEX: 21 KG/M2 | WEIGHT: 123 LBS | HEIGHT: 64 IN

## 2021-06-05 NOTE — PROGRESS NOTES
Pt here today for C1D1 of Opdivo following NP visit. He tolerated infusion well.  Medication and side effects reviewed. He left clinic ambulatory with wife.

## 2021-06-05 NOTE — PROGRESS NOTES
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    Assessment and Plan  Cancer Staging  Primary cancer of right upper lobe of lung (H)  Staging form: Lung, AJCC 8th Edition  - Clinical stage from 8/20/2019: Stage IIIB (cT4, cN2, cM0) - Signed by Jada Lopez MD on 9/4/2019      ECOG Performance    1    Distress Assessment  Distress Assessment Score: 4: Concerns about his medical issues.  Please see the discussion below.    Pain  Pain Score (Initial OR Reassessment): 3: Continue with Tylenol for the chest wall pain.      Mr. Jared Moeller is a 79 y.o. gentleman with COPD, history of smoking which he quit in 2015.  He had some old lung nodules but was found in July 2019 to have a right upper lobe lung mass, in the right suprahilar area contiguous with the mediastinum.  The mass in the PET CT scan was 5.6 x 4.7 x 4.3 cm in size and PET positive.  This involved the right suprahilar region and a right lower paratracheal mediastinum with direct extension.  There was complete collapse of the right upper lobe with involvement of the mediastinum, possibly the right pericardial region and possibly the right pleura in the right lower lobe region.  The FNA done through the endobronchial ultrasound on 8/14/2019 showed moderately differentiated squamous cell cancer of the lung.  The mediastinal lymph nodes were also positive.    He has a chronic kidney disease stage III.  I think he has some sort of primary kidney disorder.  Likely related to the nephritis that he had as a child.  He has been referred to nephrology and was seen by Dr. Rubin of [facility] [place].  Work-up is ongoing.   Our plan is for radiation along with chemotherapy with weekly carboplatin AUC of 2 and paclitaxel at 50 mg/m .  After he is done with chemotherapy and radiation we  will go with Durvalumab IV given every 2 weeks.  He started chemotherapy and radiation on 9/9/2019.  He received the last day of chemotherapy on 10/14/2019 and received the last day of radiation on 10/18/2019.  He started postradiation durvalumab on 11/11/2019.    He had a follow-up PET CT scan after the chemoradiation done on 12/2/2019.  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  When we look at the previous PET CT scan from 8/9/2019 there was some faint uptake in those areas.  Most likely that her disease was there at that time and it has grown.    He had a CT-guided biopsy from the most prominent lesion on the right-sided pleura done on 12/18/2019.  Pathology was positive for malignant cells.  The pathologist felt that the tumor morphology and immunohistochemical profile is most consistent with an adenocarcinoma.  This raises the possibility that he had a second cancer different from the original squamous cell cancer that he had at diagnosis in the central area of the chest.    We obtained additional pathology testing done.  In the next generation sequencing done at the [facility] molecular lab, we did not find any targetable mutations.  We then requested PDL 1 testing and that has been reported as not enough cells to give us a reliable answer.    1.  He continues to lose weight.  Continues to have some chest wall pain.  He has had a new PET CT scan done earlier today.  I reviewed the images and the report with the patient and his wife.  Clearly he has progression of disease.  The pleural mets on the right side chest wall have increased in size and PET activity.  The small pleural effusion has also increased in size.  I discussed with him that  unfortunately the chemoradiation did work on the primary lung cancer mass but there is progression of disease outside of the radiation field.  Thus he now has metastatic disease since this involves the pleural surface and the chest wall.  I suspect this is because we could not treat him with sufficiently high dose of carboplatin and Taxol chemotherapy because of his kidney failure.  I discussed with him that the scenario has changed completely.  With metastatic disease, we do not have the possibility of cure.  We will have to go for palliative treatment.    2.  I discussed with him that after discussion with Dr. Solorio of pathology, it does not appear that the second biopsy from the pleural metastasis done on 12/18/2019 showed a second lung cancer.  This appears to be the same origin of the lung cancer.  Dr. Solorio feels that there is evidence of both squamous and glandular differentiation in the biopsy samples.  Unfortunately there was not enough tissue left on the sample from 12/18/2019 to do p63 staining.  There is also supportive of the fact that he has metastatic disease now and not a second lung cancer.    3.  We then discussed about how to go about palliative treatment.  Unfortunately I do not think that we can radiate the whole right chest effectively.  Since we did not find any of the targetable mutations coming back positive, targeted treatment is also not possible.  In view of the patient's squamous differentiation, the right-sided pleural effusion and his poor kidney function, several chemotherapy agents will be difficult to give, especially pemetrexed.  I think the best choice for him would be to be treated as a second line with nivolumab which is approved for squamous cell lung cancer.    I discussed with him that treatment will be palliative to try to shrink the cancer down, thereby allowing him to live longer and preserve his quality of life.  It will not be curative.  I discussed with him about  the possible side effects of nivolumab treatment.  I focused on a variety of immune mediated side effects that can happen.  Some of these can be very serious and even life-threatening.  He signed a consent form after a thorough discussion.  He would like to be treated once in 4 weeks rather than with the 2 weekly dosage.  I have given him printed information about Nivolumab to read from the package insert.    4.  We will plan on repeating a PET CT scan after 2 months of nivolumab.  I want him that in the hospital CT scan there is a possibility that we may see evidence of pseudo-progression.  However if the second time we do the PET CT scan after perhaps 4-6 cycles, there is progression of disease then we will have to admit that nivolumab is not working.  If nivolumab is working, we will continue with it for as long as it is effective and tolerated.  If we achieve a complete radiological response, we may be able to stop nivolumab after 2 years of treatment.  He voiced understanding.  Down the line if there is progression of disease in spite of nivolumab treatment, we will have to try a different conventional chemotherapy regimen.    5.  I encouraged him to try to eat well.  I told him that there is really no dietary restriction.  He should try to eat well and try to preserve his body weight.    6.  He will take the help from the schedulers to make an appointment with surgery to discuss about a small right inguinal hernia.    7.  Follow-up: I have asked for him to be scheduled in 1 to 2 weeks to start nivolumab monotherapy.  Labs according to treatment plan and appointment with MD or NP on the first day.    Time spend >40 minutes face to face with the patient. More than 50 % in counseling and coordination of care.      Problem List    1. Primary cancer of right upper lobe of lung (H)  prochlorperazine (COMPAZINE) 10 MG tablet    Infusion Appointment    [facility] OFFICE VISIT LONG   2. Encounter for antineoplastic  immunotherapy  prochlorperazine (COMPAZINE) 10 MG tablet    Infusion Appointment    [facility] OFFICE VISIT LONG   3. Chronic kidney disease (CKD), stage III (moderate) (H)          CC: Maria De Jesus Fregoso MD Elizabeth Cameron, MD Andrew Hipp, DO    ______________________________________________________________________________    History of Present Illness    Mr. Jared Moeller he is here for follow-up with his wife.    He is here to discuss with me the results of the PET CT scan that he had a done earlier today.  He has lost another 2 pounds of weight.  He still has some chest wall pain.  He feels that the pain is about the same as what it was 2 days ago.  He is satisfied with Tylenol to keep the pain under control.    He is most concerned about the weight loss.  He states that he is trying to eat but does not have much of an appetite.  In spite of him eating the weight is coming down.    He has some mild shortness of breath on effort.  He is not able to do a great deal of physical work.  However he can walk on level ground without much problem.    No fevers.  No night sweats.  No lumps or bumps anywhere.  No unusual headaches.  No eyesight problems.  No mouth sores.  No swallowing difficulty.  No anginal chest pain.  No palpitation.  Cough is seldom.  It is dry.  No abdominal pain.  No complaints of constipation or diarrhea.  No blood in stool or black stools.  No skin rashes.  No numbness or tingling.    A 14 point review of system is otherwise completely negative.  Pain Status  Currently in Pain: Yes    Review of Systems    Constitutional     Neurosensory     Eye      Ear     Cardiovascular     Pulmonary     Gastrointestinal     Genitourinary     Lymphatic     Musculoskeletal and Connective Tissue     Integumentary     Patient Coping     Distress Assessment  Distress Assessment Score: 4  Accompanied by  Accompanied by: Family Member  Oral Chemo Adherence         Past History  Past Medical History:    Diagnosis Date     BPH (benign prostatic hyperplasia)      Carotid stenosis, bilateral 04/07/2016    left 50-69%. moderate right side.     Chronic kidney disease (CKD), stage III (moderate) (H)      Colon polyps      COPD (chronic obstructive pulmonary disease) (H)      Epididymal cyst, left side. 04/07/2016     Hematuria 12/2002    Work-up was negative.     Hypertension      Inguinal hernia     Right     Nephritis     11 years old.     Peripheral vascular disease (H)      Polio     6 years of age. Right leg. Recovered fully.     Shingles 1996    left forehead.         Past Surgical History:   Procedure Laterality Date     COLONOSCOPY       CT BIOPSY LUNG  12/18/2019     CYSTOSCOPY  2002     FLEXIBLE BRONCHOSCOPY  08/14/2019    with EBUS FNA>     IR EXTREMITY ANGIOGRAM BILATERAL  04/06/2016     IR PORT PLACEMENT >5 YEARS  9/11/2019     NASAL SEPTUM SURGERY  1980s     TONSILLECTOMY  1948       Physical Exam    Recent Vitals 1/22/2020   Height -   Weight 115 lbs   BSA (m2) 1.54 m2   /68   Pulse 104   Temp 97.5   Temp src 1   SpO2 95   Some recent data might be hidden       GENERAL: Alert and oriented to time place and person. Seated comfortably. In no distress.  He appears fatigued.  He has lost more weight.    HEAD: Atraumatic and normocephalic.    EYES: DANYA, EOMI.  No pallor.  No icterus.    Oral cavity: no mucosal lesion or tonsillar enlargement.    NECK: supple. JVP normal.  No thyroid enlargement.    LYMPH NODES: No palpable, cervical, axillary or inguinal lymphadenopathy.    CHEST: clear to auscultation bilaterally.  Minimal dullness at the right base.  Breath movements appear better on the left side compared to the right side.  Port-A-Cath of the left upper chest looks unremarkable.    CVS: S1 and S2 are heard. Regular rate and rhythm.  No murmur or gallop or rub heard.  No peripheral edema.    ABDOMEN: Soft. Not tender. Not distended.  No palpable hepatomegaly or splenomegaly.  No other mass  palpable.  Bowel sounds heard.    EXTREMITIES: Warm.    SKIN: no rash, or bruising or purpura.  Has a full head of hair.            Lab Results    Recent Results (from the past 24 hour(s))   POCT Glucose    Collection Time: 01/22/20 10:25 AM   Result Value Ref Range    Glucose 81 70 - 139 mg/dL         Imaging    Nm Pet Ct Skull To Mid Thigh    Result Date: 1/22/2020  EXAM: NM PET CT SKULL TO MID THIGH LOCATION: [facility] DATE/TIME: 1/22/2020 12:03 PM INDICATION: Lung cancer, right, restaging. Primary cancer of right upper lobe of lung. Prior radiation therapy. Interval chemotherapy. Suspicion for progression with metastases and chest wall. Recent initiation of immunotherapy. Subsequent treatment strategy. COMPARISON: PET/CT 12/02/2019. Baseline PET/CT 08/09/2019 reviewed. TECHNIQUE: Serum glucose level 81 mg/dL. One hour post intravenous administration of 9.6 mCi F-18 FDG, PET imaging was performed from the skull base to the mid thighs utilizing attenuation correction with concurrent axial CT and PET/CT image fusion. Dose  reduction techniques were used. FINDINGS: Since 12/20/2019, poorly marginated irregular opacity in the central right upper lobe, at site of original FDG avid mass, has decreased in size, extent and degree of FDG activity (SUVmax 4.9, previously 5.5). Numerous scattered irregular bandlike opacities have developed in the right lung with varying degrees of mild to moderate uptake, typical of postradiation inflammatory change. A few similar but less prominent opacities also involve the left lung. Scattered FDG avid right pleural nodules and masses have increased in size, number and degree of uptake, such as one in the posteromedial right pleura estimated at 1.1 x 2.6 cm (previously 0.9 x 1.8 cm) with increased moderate uptake (SUVmax 6.4, previously 5.2), suggesting progression of pleural metastases. Small right pleural effusion, slightly larger. Few small mediastinal lymph nodes  demonstrate persistent low-level FDG activity, indeterminate. No new or enlarging FDG avid adenopathy. No suspicious focal uptake in the liver or skeleton. Left IJ Port-A-Cath with tip near the SVC/RA junction. Advanced emphysema. Marked sigmoid colonic diverticulosis, mild elsewhere. Moderate prostate gland enlargement. Mild scattered degenerative changes in the spine.     1. Progression of right pleural metastases. 2. Partial favorable treatment response central right upper lobe lung cancer status post radiation therapy. Increased postradiation changes in the right greater than left lungs. 3. No new site of metastasis.        Signed by: Jada Lopez MD

## 2021-06-05 NOTE — PROGRESS NOTES
I met with Kendall and Pat in the infusion room today.  Kendall is feeling ok in general other than some some pain along his chest wall and abd.  He takes Tylenol as needed with good relief.  He questions if this pain is related to his new tx, imfinzi, or progression of his cancer.  He is scheduled for a PET/CT and f/u with Dr. Lopez on Wed.  They've been informed the scan could quite possibly show progression and are having a hard time mentally preparing for that news.  I listened and provided support today.  I offered to help get them connected with Bryanna Leo and they will let myself or our scheduling team know if they want an appt arranged.   Art and Pat shared that their co-pay for Imfinzi is quite costly.  They are asking if there is funding available for co-pay assistance.  I told them I will reach out to our financial specialists and be back in touch with them.  I also briefly discussed the [employer] clive program and WIB financial aid.  I explained these programs are income based.  I would need to ask just a couple questions about income and assets prior to applying.  Pat will call me directly with this informmation.  Message sent to Raul today.    I received a message back from Yamilet that she searched the [employer] and currently there are no open funds for lung cancer/Medicare patients.    I called Kendall and notified him of this.  I offered to look into eligibility for WIB Financial Assistance and submit an application if he meets the income guidelines.  I relayed the income guidelines for a family of two and Art said they are well above that.  Art said he and Pat are doing fine financially, they were just curious why his current tx costs more than is first regimen but they understand the cost of each drug is different.  He has a new health plan with a lower out of pocket max this year as well.  He was appreciative of the f/u and has no further questions or needs today.

## 2021-06-05 NOTE — PROGRESS NOTES
Patient presented to infusion for chemotherapy. Durvalumab infused - tolerated well. Port flushed with heparin and deaccessed. Patient left clinic independently, accompanied by spouse.

## 2021-06-05 NOTE — PROGRESS NOTES
Patient is here for two week month follow-up of lung cancer. Due D1C3  durvalumab pending labs/OV with Dr. Lopez.  Accompanied by wife, Kirsten. Laura Matos RN

## 2021-06-05 NOTE — PROGRESS NOTES
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    Assessment and Plan  Cancer Staging  Primary cancer of right upper lobe of lung (H)  Staging form: Lung, AJCC 8th Edition  - Clinical stage from 8/20/2019: Stage IIIB (cT4, cN2, cM0) - Signed by Jada Lopez MD on 9/4/2019      ECOG Performance   ECOG Performance Status: 1    Distress Assessment  Distress Assessment Score: 4(\"frustration\"): Concerns about his medical issues.  Please see the discussion below.    Pain  Pain Score (Initial OR Reassessment): 2: Please see the discussion below.        Mr. Jared Moeller is a 79 y.o. gentleman with COPD, history of smoking which he quit in 2015.  He had some old lung nodules but was found in July 2019 to have a right upper lobe lung mass, in the right suprahilar area contiguous with the mediastinum.  The mass in the PET CT scan was 5.6 x 4.7 x 4.3 cm in size and PET positive.  This involved the right suprahilar region and a right lower paratracheal mediastinum with direct extension.  There was complete collapse of the right upper lobe with involvement of the mediastinum, possibly the right pericardial region and possibly the right pleura in the right lower lobe region.  The FNA done through the endobronchial ultrasound on 8/14/2019 showed moderately differentiated squamous cell cancer of the lung.  The mediastinal lymph nodes were also positive.    He has a chronic kidney disease stage III.  I think he has some sort of primary kidney disorder.  Likely related to the nephritis that he had as a child.  He has been referred to nephrology and was seen by Dr. Rubin of [facility] [place].  Work-up is ongoing.   Our plan is for radiation along with chemotherapy with weekly carboplatin AUC of 2 and paclitaxel at 50 mg/m .  After he is done with " chemotherapy and radiation we will go with Durvalumab IV given every 2 weeks.  He started chemotherapy and radiation on 9/9/2019.  He received the last day of chemotherapy on 10/14/2019 and received the last day of radiation on 10/18/2019.  He started postradiation durvalumab on 11/11/2019.    He had a follow-up PET CT scan after the chemoradiation done on 12/2/2019.  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  When we look at the previous PET CT scan from 8/9/2019 there was some faint uptake in those areas.  Most likely that her disease was there at that time and it has grown.    He had a CT-guided biopsy from the most prominent lesion on the right-sided pleura done on 12/18/2019.  Pathology was positive for malignant cells.  The pathologist felt that the tumor morphology and immunohistochemical profile is most consistent with an adenocarcinoma.  This raises the possibility that he had a second cancer different from the original squamous cell cancer that he had at diagnosis in the central area of the chest.    We obtained additional pathology testing done.  In the next generation sequencing done at the [facility] molecular lab, we did not find any targetable mutations.  We then requested PDL 1 testing and that has been reported as not enough cells to give us a reliable answer.    1.  I discussed the above developments with the patient.  I discussed with him that we are not quite sure what is going on here.  What ever it is, the fact that the mutation testing came back negative tells us that targetable mutations were not found and so we do not have the opportunity to treat him with a newer targeted therapy.  If he truly has progression of disease, then I  think we have to consider the possibility of going to full-fledged chemotherapy possibly with immunotherapy.  But before that we need to figure out some things.  There is a possibility that what we are seeing on the chest wall was pseudo-progression because of the durvalumab.  If he already had seeding to the chest wall it may have become more prominent after durvalumab treatment.  Another question that we have to figure out is whether this is truly a second cancer or whether the original lung cancer and that this recurrence is the same thing.  I discussed with him that the fact that he is losing weight and he has the mild chest wall tenderness both make me worried that he actually has progression of disease rather than improvement.  I think we have to go ahead and obtain a new PET CT scan and see what is going on.  It is now more than 6 weeks from the last PET CT scan and I think this will help us figure out what exactly is going on with him.  He was agreeable to the plan.    2.  His blood counts and metabolic panel from today are stable and so we will go ahead with the durvalumab infusion that was planned for today.    3.  For the pain on his chest, he will continue with Tylenol as needed.  He does not think that he needs stronger pain medications.  I asked him to avoid NSAID's.  If he has increasing pain I asked him to give us a call.    4.  For the right inguinal hernia I am referring him to surgery.  I did discuss with him that the danger of the hernia is if it becomes irreducible or if it becomes incarcerated.  Thus if it becomes irreducible or painful or he is not able to have a bowel movement then he needs to go to the ER immediately.  I also discussed with him that with the issue about the metastatic lung cancer going on, it is likely that the surgeons will be leaning towards conservative management rather than going towards surgery right away.  He voiced understanding.    5.  I encouraged him to continue  follow-up with nephrology.  However his creatinine level has remained fairly stable.    6.  For the dry skin I encouraged him to use some moisturizing lotion to 3 times a day topically all over the body.    7.  I did discuss with the pathologist Dr. Solorio.  We will obtain p63 staining on the CT-guided lung biopsy from 12/18/2019.  Dr. Solorio will also be comparing the pathology slides from the original biopsy from the bronchoscopy dated 8/14/2019 and the CT-guided biopsy from 12/18/2019.  I think we would be able to reach a conclusion about whether this is a single cancer or 2 different cancers from that.  That will have a big impact on the chemotherapy choices.    8.  Follow-up: I have ordered a new PET CT scan.  He will have that done and follow-up with me soon after that.    Time spend >40 minutes face to face with the patient. More than 50 % in counseling and coordination of care.    Problem List    1. Primary cancer of right upper lobe of lung (H)  NM PET CT Skull to Mid Thigh    Infusion Appointment    Pathology Additional Testing    DISCONTINUED: sodium chloride 0.9% 250 mL infusion    DISCONTINUED: durvalumab 541 mg in sodium chloride 0.9% 250 mL (IMFINZI)    DISCONTINUED: sodium chloride flush 20 mL (NS)    DISCONTINUED: heparin lockflush (PF) porcine 300-600 Units    DISCONTINUED: diphenhydrAMINE injection 50 mg (BENADRYL)    DISCONTINUED: famotidine 20 mg/2 mL injection 20 mg (PEPCID)    DISCONTINUED: hydrocortisone sod succ (PF) injection 100 mg    DISCONTINUED: acetaminophen tablet 1,000 mg (TYLENOL)    DISCONTINUED: sodium chloride 0.9% 500 mL   2. Encounter for antineoplastic immunotherapy     3. Right inguinal hernia  Ambulatory referral to General Surgery        CC: Maria De Jesus Fregoso MD Elizabeth Cameron, MD Mulugeta Rubin, DO    ______________________________________________________________________________    History of Present Illness    Mr. Jared Moeller is here for follow-up with  his wife.    He is due for the next durvalumab infusion.  He says that he is feeling somewhat frustrated with the treatment for the lung cancer.    He says that he has some mild chest wall pain going on.  This is present on both sides of the chest wall and also in the back and also in the muscles of the upper abdomen.  The pain is mild.  He finds it difficult even to give a numeric rating.  He is taking Tylenol for that and he thinks that is working fairly well.    He says that his energy is poor than earlier.  He also feels that his breathing is perhaps not as good as what it was earlier.  He still can go up 1 flight of stairs without problem.  He and his wife are still walking the robbie weights for exercise.  Apparently he does not do much more than that.    He is eating well but he is losing weight slowly.    He says that he has a right inguinal hernia for some years.  He feels that is a bit more prominent.  It does not cause any pain.  It is reducible.  He is having bowel movements regularly.  He wants me to check that out.    Cough is occasional.  Early brings up any phlegm.    He has dry skin.  He is not really applying any moisturizing lotion.  No skin rashes.    No fevers.  No night sweats.  No lumps or bumps anywhere.  No unusual headaches.  No eyesight problems.  No mouth sores.  No swallowing difficulty.  No nausea or vomiting.  No abdominal pain.  No constipation or diarrhea.  No blood in stool or black stools.  No difficulty with urination.  No complaints of numbness or tingling.    Please see below.  A 14 point review of system is otherwise completely negative.    Pain Status  Currently in Pain: Yes    Review of Systems    Constitutional  Constitutional (WDL): Exceptions to WDL  Fatigue: Fatigue not relieved by rest - Limiting instrumental ADL  Fever: None  Chills: None  Weight Gain: None  Weight Loss: to <10% from baseline, intervention not indicated(2 lbs)  Neurosensory  Neurosensory (WDL): Exceptions  "to WDL  Peripheral Motor Neuropathy: None  Ataxia: None  Peripheral Sensory Neuropathy: None  Confusion: None  Syncope: None  Cardiovascular  Cardiovascular (WDL): All cardiovascular elements are within defined limits  Pulmonary  Respiratory (WDL): Exceptions to WDL  Cough: Mild symptoms, nonprescription intervention indicated  Dyspnea: Shortness of breath with minimal exertion, limiting instrumental ADL  Hypoxia: None  Gastrointestinal  Gastrointestinal (WDL): Exceptions to WDL  Anorexia: Loss of appetite without alteration in eating habits  Constipation: None  Diarrhea: None  Dysphagia: None  Esophagitis: None  Nausea: None  Pharyngitis: None  Vomiting: None  Dysgeusia: Altered taste but no change in diet  Dry Mouth: Symptomatic (e.g., dry or thick saliva) without significant dietary alteration, unstimulated saliva flow >0.2 ml/min  Genitourinary  Genitourinary (WDL): All genitourinary elements are within defined limits  Integumentary  Integumentary (WDL): Exceptions to WDL  Alopecia: None  Rash Maculo-Papular: Macules/papules covering <10% BSA with or without symptoms (e.g., pruritus, burning, tightness)  Pruritus: None  Urticaria: Urticarial lesions covering <10% BSA, topical intervention indicated  Palmar-Plantar Erythrodysesthesia Syndrome: None  Flushing: None  Patient Coping  Patient Coping: Open/discussion  Distress Assessment  Distress Assessment Score: 4(\"frustration\")  Accompanied by  Accompanied by: Family Member    Past History  Past Medical History:   Diagnosis Date     BPH (benign prostatic hyperplasia)      Carotid stenosis, bilateral 04/07/2016    left 50-69%. moderate right side.     Chronic kidney disease (CKD), stage III (moderate) (H)      Colon polyps      COPD (chronic obstructive pulmonary disease) (H)      Epididymal cyst, left side. 04/07/2016     Hematuria 12/2002    Work-up was negative.     Hypertension      Inguinal hernia     Right     Nephritis     11 years old.     Peripheral " "vascular disease (H)      Polio     6 years of age. Right leg. Recovered fully.     Shingles 1996    left forehead.         Past Surgical History:   Procedure Laterality Date     COLONOSCOPY       CT BIOPSY LUNG  12/18/2019     CYSTOSCOPY  2002     FLEXIBLE BRONCHOSCOPY  08/14/2019    with EBUS FNA>     IR EXTREMITY ANGIOGRAM BILATERAL  04/06/2016     IR PORT PLACEMENT >5 YEARS  9/11/2019     NASAL SEPTUM SURGERY  1980s     TONSILLECTOMY  1948       Physical Exam    Recent Vitals 1/20/2020   Height 5' 4\"   Weight 117 lbs   BSA (m2) 1.55 m2   /64   Pulse 104   Temp 97.5   Temp src 1   SpO2 94   Some recent data might be hidden       GENERAL: Alert and oriented to time place and person. Seated comfortably. In no distress.  He has lost weight.  I think he looks a bit more tired.  He is anxious.    HEAD: Atraumatic and normocephalic.    EYES: DANYA, EOMI.  No pallor.  No icterus.    Oral cavity: no mucosal lesion or tonsillar enlargement.    NECK: supple. JVP normal.  No thyroid enlargement.    LYMPH NODES: No palpable, cervical, axillary or inguinal lymphadenopathy.    CHEST: clear to auscultation bilaterally.  Resonant to percussion throughout bilaterally.  Symmetrical breath movements bilaterally.  Appeares to have some mild chest wall tenderness    The Port-A-Cath over the left upper chest looks unremarkable.    CVS: S1 and S2 are heard. Regular rate and rhythm.  No murmur or gallop or rub heard.  No peripheral edema.    ABDOMEN: Soft. Not tender. Not distended.  No palpable hepatomegaly or splenomegaly.  No other mass palpable.  Bowel sounds heard.  He has a right inguinal hernia that is about a quarter size.  Easily reducible.    EXTREMITIES: Warm.    SKIN: no rash, or bruising or purpura.  Has a full head of hair.            Lab Results    Recent Results (from the past 24 hour(s))   Basic Metabolic Panel    Collection Time: 01/20/20 11:02 AM   Result Value Ref Range    Sodium 140 136 - 145 mmol/L    " Potassium 4.4 3.5 - 5.0 mmol/L    Chloride 107 98 - 107 mmol/L    CO2 26 22 - 31 mmol/L    Anion Gap, Calculation 7 5 - 18 mmol/L    Glucose 96 70 - 125 mg/dL    Calcium 9.5 8.5 - 10.5 mg/dL    BUN 41 (H) 8 - 28 mg/dL    Creatinine 1.40 (H) 0.70 - 1.30 mg/dL    GFR MDRD Af Amer 59 (L) >60 mL/min/1.73m2    GFR MDRD Non Af Amer 49 (L) >60 mL/min/1.73m2   Protein / creatinine ratio, urine    Collection Time: 01/20/20 11:02 AM   Result Value Ref Range     Protein, Random Urine 22 mg/dL    Creatinine, Urine 118.2 mg/dL    Protein/Creatinine Ratio, Random Urine 0.19    Phosphorus    Collection Time: 01/20/20 11:02 AM   Result Value Ref Range    Phosphorus 3.1 2.5 - 4.5 mg/dL   Comprehensive Metabolic Panel    Collection Time: 01/20/20 11:03 AM   Result Value Ref Range    Sodium 139 136 - 145 mmol/L    Potassium 4.4 3.5 - 5.0 mmol/L    Chloride 106 98 - 107 mmol/L    CO2 26 22 - 31 mmol/L    Anion Gap, Calculation 7 5 - 18 mmol/L    Glucose 97 70 - 125 mg/dL    BUN 40 (H) 8 - 28 mg/dL    Creatinine 1.37 (H) 0.70 - 1.30 mg/dL    GFR MDRD Af Amer >60 >60 mL/min/1.73m2    GFR MDRD Non Af Amer 50 (L) >60 mL/min/1.73m2    Bilirubin, Total 0.2 0.0 - 1.0 mg/dL    Calcium 9.5 8.5 - 10.5 mg/dL    Protein, Total 6.5 6.0 - 8.0 g/dL    Albumin 3.2 (L) 3.5 - 5.0 g/dL    Alkaline Phosphatase 77 45 - 120 U/L    AST 13 0 - 40 U/L    ALT 13 0 - 45 U/L   TSH    Collection Time: 01/20/20 11:03 AM   Result Value Ref Range    TSH 1.96 0.30 - 5.00 uIU/mL   HM1 (CBC with Diff)    Collection Time: 01/20/20 11:03 AM   Result Value Ref Range    WBC 7.1 4.0 - 11.0 thou/uL    RBC 3.86 (L) 4.40 - 6.20 mill/uL    Hemoglobin 11.3 (L) 14.0 - 18.0 g/dL    Hematocrit 35.5 (L) 40.0 - 54.0 %    MCV 92 80 - 100 fL    MCH 29.3 27.0 - 34.0 pg    MCHC 31.8 (L) 32.0 - 36.0 g/dL    RDW 15.0 (H) 11.0 - 14.5 %    Platelets 230 140 - 440 thou/uL    MPV 10.5 8.5 - 12.5 fL    Neutrophils % 70 50 - 70 %    Lymphocytes % 16 (L) 20 - 40 %    Monocytes % 9 2 - 10 %     Eosinophils % 4 0 - 6 %    Basophils % 0 0 - 2 %    Neutrophils Absolute 5.0 2.0 - 7.7 thou/uL    Lymphocytes Absolute 1.1 0.8 - 4.4 thou/uL    Monocytes Absolute 0.7 0.0 - 0.9 thou/uL    Eosinophils Absolute 0.3 0.0 - 0.4 thou/uL    Basophils Absolute 0.0 0.0 - 0.2 thou/uL         Imaging    No results found.      Signed by: Jada Lopez MD

## 2021-06-05 NOTE — PATIENT INSTRUCTIONS - HE
Recent Results (from the past 24 hour(s))   Comprehensive Metabolic Panel   Result Value Ref Range    Sodium 140 136 - 145 mmol/L    Potassium 4.1 3.5 - 5.0 mmol/L    Chloride 110 (H) 98 - 107 mmol/L    CO2 23 22 - 31 mmol/L    Anion Gap, Calculation 7 5 - 18 mmol/L    Glucose 122 70 - 125 mg/dL    BUN 37 (H) 8 - 28 mg/dL    Creatinine 1.41 (H) 0.70 - 1.30 mg/dL    GFR MDRD Af Amer 59 (L) >60 mL/min/1.73m2    GFR MDRD Non Af Amer 48 (L) >60 mL/min/1.73m2    Bilirubin, Total 0.2 0.0 - 1.0 mg/dL    Calcium 9.3 8.5 - 10.5 mg/dL    Protein, Total 6.5 6.0 - 8.0 g/dL    Albumin 3.3 (L) 3.5 - 5.0 g/dL    Alkaline Phosphatase 76 45 - 120 U/L    AST 15 0 - 40 U/L    ALT 11 0 - 45 U/L   HM1 (CBC with Diff)   Result Value Ref Range    WBC 6.0 4.0 - 11.0 thou/uL    RBC 3.88 (L) 4.40 - 6.20 mill/uL    Hemoglobin 11.3 (L) 14.0 - 18.0 g/dL    Hematocrit 35.1 (L) 40.0 - 54.0 %    MCV 91 80 - 100 fL    MCH 29.1 27.0 - 34.0 pg    MCHC 32.2 32.0 - 36.0 g/dL    RDW 15.1 (H) 11.0 - 14.5 %    Platelets 205 140 - 440 thou/uL    MPV 10.4 8.5 - 12.5 fL    Neutrophils % 70 50 - 70 %    Lymphocytes % 15 (L) 20 - 40 %    Monocytes % 10 2 - 10 %    Eosinophils % 4 0 - 6 %    Basophils % 1 0 - 2 %    Neutrophils Absolute 4.2 2.0 - 7.7 thou/uL    Lymphocytes Absolute 0.9 0.8 - 4.4 thou/uL    Monocytes Absolute 0.6 0.0 - 0.9 thou/uL    Eosinophils Absolute 0.3 0.0 - 0.4 thou/uL    Basophils Absolute 0.0 0.0 - 0.2 thou/uL

## 2021-06-05 NOTE — PROGRESS NOTES
[facility] Hematology and Oncology Progress Note    Patient: Jared Moeller  MRN: [id number]  Date of Service: 02/03/2020        Reason for Visit:    D1C1 palliative nivolumab therapy.     Assessment and Plan:    Cancer Staging    1. Primary cancer of right upper lobe of lung (H)    Clinical Stage IIIB (cT4, cN2, cM0)     79 y.o.     Personal history of COPD.  Quit smoking in 2015 with 46 year p/h.      07/31/19 CT chest - obstructing central right suprahilar mass and complete atelectasis right upper lobe. 8mm rounded indeterminate nodule at left lung base. Severe emphysema.    08/09/19 NM PET - 4.3 x 4.7 x 5.6 cm mass in the central right upper lobe involving the right suprahilar region and right lower paratracheal mediastinum by direct extension. Associated complete right upper lobe atelectasis. Separate FDG avid right lower paratracheal station 4 and subcarinal station 7 (SUVmax 7.2), adenopathy suspicious for arik metastases. Few prominent right anterior cardiophrenic nodes with low-level uptake are indeterminate but suspicious for additional early arik metastases. Mild areas of subpleural thickening in the posterior right chest associated with low-level but appreciable uptake. No pleural effusion. No suspicious focal uptake in the liver skeleton. Normal adrenal glands. Severe emphysema. Moderate atherosclerotic calcifications including the coronary arteries. 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 Marked prostate gland enlargement. Large left scrotal hydrocele. Moderate scattered changes in the spine.    08/14/19 FNA through the endobronchial ultrasound - showed moderately differentiated squamous cell cancer of the lung.  The mediastinal lymph nodes were also positive.    08/28/19 MR brain - No findings to suggest intracranial metastases. No evidence of an acute intracranial abnormality. Findings compatible with mild chronic small vessel  ischemic change.    08/30/19 US kidney - Both kidneys appear echogenic suggesting medical renal disease. There is mild bilateral renal atrophy.  Enlarged prostate gland.    There was concern that he may have pulmonary tuberculosis because the auramine rhodamine stain for acid-fast bacilli was positive and the aspirate from the lower paratracheal lymph node.    Kinyoun stain for AFB came back negative.      Mantoux was negative.      QuantiFERON test was negative.      Sputum AFB x3 was negative by staining and by PCR.      Cultures were negative.      Consult with  of infectious diseases.    Chemoradiation with a small chance of cure was recommended.  Considering his kidney failure, carboplatin rather than cisplatin will be used.     09/09 - 10/14/19 completed weekly carboplatin (AUC 2) + paclitaxel (50 mg/m ) chemotherapy concurrent with 30 fx / 6000 cGy EBRT.      09/11/19 - IR port-a-cath placement.    11/11/19 - 01/20/20 completed 3 cycles maintenance Durvalumab therapy.    12/02/19 NM PET - Mixed response with significant improvement in right suprahilar lung mass and hilar and mediastinal lymphadenopathy but progression of what appear to be right pleural metastases.    12/18/19 CT (R) lung nodule biopsy - c/w adenocarcinoma.  No targetable mutations.    01/22/20 NM PET - Progression of right pleural metastases. Partial favorable treatment response central right upper lobe lung cancer status post radiation therapy. Increased postradiation changes in the right greater than left lungs. No new site of metastasis.    01/22/20 consult with Dr Lopez - with progression of disease outside of the radiation field, he now has metastatic disease involving the pleural surface and chest wall likely because we could not treat him with a sufficiently high dose of  chemotherapy because of his kidney failure.  Art was informed that we no longer have the possibility of cure, rather palliative treatment.  Second line  therapy with nivolumab was recommended.    02/03/20:    HEME1 - WBC, ANC and Plts WNL.  HgB stable @ 11.3.    Found to be mildly iron deficient at his nephrology appointment - started on SR-iron, one daily - tolerating OK.    CMP - stable CKD.  Slightly improved hypoalbuminemia @ 3.4.      Encouraged pushing oral fluids.    Encouraged increasing from 1 to 2 Boost protein or CIB supplement/day.    TSH - WNL.    Art presents accompanied by his wife anticipating D1C1 palliative Nivolumab therapy.  He continues to note mild shortness of breath, mild fatigue and mild arthralgias and myalgias.  He has a history of PAD in his legs - used to walk a mile/day, now limited to the walking required for shopping.  He continues to use Tylenol PM for sleep.   His appetite is only fair with early satiety, however his weight is quite stable with that of 6 months ago.     I reviewed the possible side effects of nivolumab treatment, focusing on the variety of immune mediated side effects.  Some that can be very serious and even life-threatening.      He has signed a consent to treat with Nivolumab therapy.      He has reviewed the printed information he was previously given about Nivolumab therapy.    Any residual questions were answered.    Proceed with D1C1 Nivolumab therapy.    I instructed Art to monitor his mild, stable shortness of breath.  If it would worsen, he is to let us know.      02/14/20 - surgical consult for small right inguinal hernia.    02/17/20 - BMP per Urology request to evaluate kidney function on Nivolumab.    03/02/20 - follow up D1C2 Opdivo therapy with labs per Treatment Plan.    NM PET after 2 months of nivolumab.      If nivolumab is working, we will continue with it for as long as it is effective and tolerated.  If he achieves a complete radiological response, we may be able to stop nivolumab after 2 years of treatment.  If there is progression of disease, we will have to try conventional chemotherapy.      2.  Chronic kidney disease stage III.      Likely a primary kidney disorder r/t the nephritis he had as a child.     Follows with Nephrology, Dr. Rubin of [facility] [place].      01/23/20 - Urology f/u with Dr Rubin:    Stable CKD since at least 2016     Off ACE - likely the cause of drop in creatinine.     Three month follow up. No NSAIDs.     Good hydration.     No indication for renal biopsy at this time.     Risk for AIN with Nivolumab - BMP every 2 weeks.      ECOG Performance:     ECOG Performance Status: 1    Distress Assessment:    Distress Assessment Score: 1    Pain:    Pain Score (Initial OR Reassessment): 3        TT > 25 minutes face to face with > 50 % in counseling and coordination of care.    Merlin Mendenhall, CNP     CC: Maria De Jesus Fregoso MD Elizabeth Cameron, MD Andrew Hipp, DO  __________________________________________________    Interim History:    Mr. Jared Moeller presents accompanied by his wife anticipating D1C1 palliative Nivolumab therapy.  He is in good spirits and looks and feels quite good.  He continues to note mild shortness of breath, mild fatigue and mild arthralgias and myalgias.  He has a history of PAD in his legs - used to walk a mile/day, now limited to the walking required for shopping.  He continues to use Tylenol PM for sleep.   His appetite is only fair with early satiety, however his weight is quite stable with that of 6 months ago.  No concerning chronic neck and shoulder discomfort since bought an ergonomic pillow.  He denies concerning pain, fever or chills, visual or mentation disturbance, difficulty with bowels or urination, skin rash.    Pain Status:    Currently in Pain: Yes     Review of Systems:    Constitutional  Constitutional (WDL): Exceptions to WDL  Fatigue: Fatigue relieved by rest  Weight Gain: 5 - <10% from baseline(3 lbs)  Neurosensory  Neurosensory (WDL): All neurosensory elements are within defined limits  Eye   Eye  Disorder (WDL): Exceptions to WDL  Dry Eye: Asymptomatic, clinical or diagnostic observations only, mild symptoms relieved by lubricants  Ear  Ear Disorder (WDL): Exceptions to WDL  Tinnitus: Mild symptoms, intervention not indicated(chronic.)  Cardiovascular  Cardiovascular (WDL): Exceptions to WDL(tachy)  Pulmonary  Respiratory (WDL): Exceptions to WDL  Cough: Mild symptoms, nonprescription intervention indicated(dry cough mostly, occ. productive from sinues per pt. )  Dyspnea: Shortness of breath with minimal exertion, limiting instrumental ADL  Gastrointestinal  Gastrointestinal (WDL): Exceptions to WDL  Anorexia: Loss of appetite without alteration in eating habits  Dysgeusia: Altered taste but no change in diet(improving.)  Genitourinary  Genitourinary (WDL): All genitourinary elements are within defined limits  Lymphatic  Lymph (WDL): All lymph disorder elements are within defined limits  Musculoskeletal and Connective Tissue  Musculoskeletal and Connetive Tissue Disorders (WDL): Exceptions to WDL  Arthralgia: Mild pain(occ.)  Muscle Weakness : Symptomatic, perceived by patient but not evident on physical exam  Myalgia: Mild pain(chset muscles - sore but improving)  Integumentary  Integumentary (WDL): All integumentary elements are within defined limits  Patient Coping  Patient Coping: Open/discussion  Distress Assessment  Distress Assessment Score: 1  Accompanied by  Accompanied by: Family Member  Oral Chemo Adherence       Past History:    Past Medical History:   Diagnosis Date     BPH (benign prostatic hyperplasia)      Carotid stenosis, bilateral 04/07/2016    left 50-69%. moderate right side.     Chronic kidney disease (CKD), stage III (moderate) (H)      Colon polyps      COPD (chronic obstructive pulmonary disease) (H)      Epididymal cyst, left side. 04/07/2016     Hematuria 12/2002    Work-up was negative.     Hypertension      Inguinal hernia     Right     Nephritis     11 years old.     Peripheral  vascular disease (H)      Polio     6 years of age. Right leg. Recovered fully.     Shingles 1996    left forehead.         Past Surgical History:   Procedure Laterality Date     COLONOSCOPY       CT BIOPSY LUNG  12/18/2019     CYSTOSCOPY  2002     FLEXIBLE BRONCHOSCOPY  08/14/2019    with EBUS FNA>     IR EXTREMITY ANGIOGRAM BILATERAL  04/06/2016     IR PORT PLACEMENT >5 YEARS  9/11/2019     NASAL SEPTUM SURGERY  1980s     TONSILLECTOMY  1948       Physical Exam:    Recent Vitals 2/3/2020   Height -   Weight 118 lbs 5 oz   BSA (m2) 1.56 m2   /82   Pulse 118   Temp 97.5   Temp src 1   SpO2 93   Some recent data might be hidden       GENERAL:   Very pleasant.  Alert and oriented.  Comfortable.  In no acute distress.  Spouse accompanies.    HEENT:   Atraumatic and normocephalic.  DANYA.  EOMI.  No pallor.  No icterus. No mucosal lesions.    LYMPH NODES:  No palpable cervical, axillary or inguinal lymphadenopathy.    CHEST:   Diminished breath sounds bilaterally.    Port-a-cath in place.  No s/s infection.    CVS:    S1 and S2 aheard.  Regular rate and rhythm. No murmur, gallop or rub heard.  No peripheral edema.    ABDOMEN:   Soft.  Not tender or distended.  No palpable hepatomegaly or splenomegaly, masses or ascites.     EXTREMITIES:  Warm.    SKIN:    No rash, bruising or purpura noted.    Lab Results:    Reviewed with patient.    Recent Results (from the past 24 hour(s))   Comprehensive Metabolic Panel   Result Value Ref Range    Sodium 140 136 - 145 mmol/L    Potassium 4.1 3.5 - 5.0 mmol/L    Chloride 110 (H) 98 - 107 mmol/L    CO2 23 22 - 31 mmol/L    Anion Gap, Calculation 7 5 - 18 mmol/L    Glucose 122 70 - 125 mg/dL    BUN 37 (H) 8 - 28 mg/dL    Creatinine 1.41 (H) 0.70 - 1.30 mg/dL    GFR MDRD Af Amer 59 (L) >60 mL/min/1.73m2    GFR MDRD Non Af Amer 48 (L) >60 mL/min/1.73m2    Bilirubin, Total 0.2 0.0 - 1.0 mg/dL    Calcium 9.3 8.5 - 10.5 mg/dL    Protein, Total 6.5 6.0 - 8.0 g/dL    Albumin 3.3 (L) 3.5  - 5.0 g/dL    Alkaline Phosphatase 76 45 - 120 U/L    AST 15 0 - 40 U/L    ALT 11 0 - 45 U/L   TSH   Result Value Ref Range    TSH 2.17 0.30 - 5.00 uIU/mL   HM1 (CBC with Diff)   Result Value Ref Range    WBC 6.0 4.0 - 11.0 thou/uL    RBC 3.88 (L) 4.40 - 6.20 mill/uL    Hemoglobin 11.3 (L) 14.0 - 18.0 g/dL    Hematocrit 35.1 (L) 40.0 - 54.0 %    MCV 91 80 - 100 fL    MCH 29.1 27.0 - 34.0 pg    MCHC 32.2 32.0 - 36.0 g/dL    RDW 15.1 (H) 11.0 - 14.5 %    Platelets 205 140 - 440 thou/uL    MPV 10.4 8.5 - 12.5 fL    Neutrophils % 70 50 - 70 %    Lymphocytes % 15 (L) 20 - 40 %    Monocytes % 10 2 - 10 %    Eosinophils % 4 0 - 6 %    Basophils % 1 0 - 2 %    Neutrophils Absolute 4.2 2.0 - 7.7 thou/uL    Lymphocytes Absolute 0.9 0.8 - 4.4 thou/uL    Monocytes Absolute 0.6 0.0 - 0.9 thou/uL    Eosinophils Absolute 0.3 0.0 - 0.4 thou/uL    Basophils Absolute 0.0 0.0 - 0.2 thou/uL       Imaging:    No new imaging.

## 2021-06-05 NOTE — PROGRESS NOTES
[email or web address]        NAME: Gibson Osborne is a 11 [facility] via [address]  Art is here today for ongoing management and tx of met lung cancer. Today is d1C1 Optivo pending labs and OV with Merlin Mendenhall NP. Elsie Simmons

## 2021-06-05 NOTE — PROGRESS NOTES
Patient is here for follow-up of lung cancer, hand NM PET scan done 1/22/2020.  Accompanied by wife, Pat.  Laura Matos RN

## 2021-06-06 NOTE — PROGRESS NOTES
Patient is here for one month follow-up of lung cancer. Due D1C2 nivolumab pending labs/OV with Dr. Lopez.  Accompanied by wife, Pat. Laura Matos RN

## 2021-06-06 NOTE — PROGRESS NOTES
"PULMONARY CLINIC FOLLOW UP NOTE    History:     HPI: Jared Moeller is a 79 y.o. male  who is here for follow up.  Patient lives in [place] in the winter and in [place] in the summer.  He was found to have \"spot\" in his lung about 3 to 4 years ago and was followed closely back in [place].  He noted that he had a CT scan of the chest 6 months ago that showed no change.  However he had started developing weight loss, approximate 15 pounds over the past 6 months.  He also had a cough that was nonproductive and denies any hemoptysis.  This led to a CT scan of the chest that was done in [place] and showed right upper lobe lung mass.  He then came to [place] for further evaluation.  Patient had a PET scan followed by [facility] and EBUS.  Pathology was consistent with squamous cell carcinoma.  He has since followed up with oncology.  Patient had a PET scan on 1/22/2020 that showed progression of disease.  There were pleural mets on the right side.  He was started on second line treatment with new volume lab.    Interval History: Patient is here for his scheduled follow-up.  He notes that he has shortness of breath with exertion.  He has a cough but denies any hemoptysis.  He denies any wheezing. He notes that he has PND. He uses allegra. He does not use any Flonase. With respect to inhalers, he is not using spiriva.     PMHx/PSHx:  Lung cancer diagnosed 2019  COPD  Seasonal allergies  PVD  HTN  CKD  H/o polio  H/o inguinal hernia      Social Hx:    Lives in [place] in the winter and [place] in the summer  Used to smoke 1 pack per day and quit 4 years ago    ROS: 10 point review of system done. Pertinent findings are noted in the HPI.    Exam/Data:   /76   Pulse (!) 108   Resp 28   Wt 121 lb (54.9 kg)   SpO2 94% Comment: ra  BMI 20.77 kg/m  , Body mass index is 20.77 kg/m .  GEN: comfortable, NAD  HEENT: NCAT, EMOI, mmm  CVS: S1S2, RRR  Lung: fair air entry  Abd: soft, nt, + BS. No masses  Ext: no c/c/e  Neuro: " nonfocal  Skin: no visible rash  Vasc: intact radial pulses bilaterally  Musculoskeletal: FROM all extremities  Psych: normal affect    Data:   Labs and Imaging personally reviewed.  Pertinent findings include:      Mr Brain With Without Contrast  1.  No findings to suggest intracranial metastases. 2.  No evidence of an acute intracranial abnormality. 3.  Findings compatible with mild chronic small vessel ischemic change.     Nm Pet Ct Skull To Mid Thigh  Result Date: 8/9/2019  CONCLUSION: Findings suspicious for central right upper lobe lung cancer with direct mediastinal extension as well as metastases involving thoracic lymph nodes including mediastinum and possibly right pericardial region, and possibly right pleura.       PFT;s:  FEV1/FVC is 53 and is reduced.  FEV1 is 74% predicted and is reduced.  FVC is 105% predicted and is normal.  There was no improvement in spirometry after a single inhaled dose of bronchodilator.  TLC is 115% predicted and is normal.  RV is 132% predicted and is increased.  DLCO is 49% predicted and is reduced when it   is corrected for hemoglobin.  The flow volume loop is normal shows severe scooping  Impression:  Full Pulmonary Function Test is abnormal. Spirometry is consistent with mild obstructive ventilatory defect.  Spirometry is not consistent with reversibility.  There is no hyperinflation.  There is air-trapping.  Diffusion capacity when corrected for hemoglobin is moderately reduced.    PET CT on 1/2020:  1. Progression of right pleural metastases.  2. Partial favorable treatment response central right upper lobe lung cancer status post radiation therapy. Increased postradiation changes in the right greater than left lungs.  3. No new site of metastasis.    Assessment/Plan:     Jared Moeller is a 79 y.o. male , former smoker, with history of lung mass that has been stable over the past several years until most recently showing marked growth.    Had been followed in [place].  " We only have one CT scan of the chest that we were just able to upload it done in 6/2019.  He had a CT scan of the chest done here in [place] on 7/2019.  This confirmed the right upper lobe lung mass which is obstructing his right upper lobe.  There is also e/o severe emphysema.  Unfortunately, we don't have his previous CT chest imaging when he was told he had a \"spot\" 3-4 years ago. The only CT chest that we were finally able to upload is the most recent that he had in [place] showing a large suprahilar mass with complete atelectasis of the RUL.  PFT's as noted above with mild to moderate obstructive picture.    Pt had a PET scan and this was followed by bronch/ebus. TBNA FNA from mediastinal mass, R4, and R10, and lavage/brush were c/w squamous cell carcinoma. Immunohistochemical stain P63 was positive, TTF-1 was negative.  Follow up imaging and biopsies notable for progression of disease and mets to pleura.    Of note, he had auramine-rhodamine stain on R4 that was positive for AFB. However, Kinyoun stainand GMS stain for AFB was negative. This was sent to ID by MD and stain came back negative. Quantiferon was also negative.    Recommendations:  Chemoradiation per heme/con  Restart spiriva  Start pt on albuterol inhaler  Start flonase for PND    FOLLOW UP: 6 months    Lauri Barboza MD  Pulmonary and Critical Care Medicine  Electronically Signed on 3/6/2020    Current Outpatient Medications   Medication Sig Dispense Refill     acetaminophen (TYLENOL) 500 MG tablet Take 500 mg by mouth every 6 (six) hours as needed for pain.       cholecalciferol, vitamin D3, 1,000 unit (25 mcg) tablet Take 1,000 Units by mouth daily.       cilostazol (PLETAL) 100 MG tablet Take 1 tablet (100 mg total) by mouth 2 (two) times a day. 180 tablet 3     diphenhydrAMINE-acetaminophen (TYLENOL PM EXTRA STRENGTH)  mg Tab Take 1 tablet by mouth at bedtime as needed.       epinephrine (EPIPEN INJ) Inject as directed as needed.   "     ferrous sulfate SR (SLOW FE) 142 mg (45 mg iron) TbER Take 45 mg by mouth daily with breakfast.       fexofenadine (ALLEGRA ALLERGY) 60 MG tablet Take 60 mg by mouth as needed.              Lactobacillus rhamnosus GG (CULTURELLE) 15 billion cell CpSP Take 1 capsule by mouth daily.        tiotropium (SPIRIVA WITH HANDIHALER) 18 mcg inhalation capsule Place 1 capsule (2 puffs total) into inhaler and inhale daily. 90 capsule 3     albuterol (PROVENTIL HFA) 90 mcg/actuation inhaler Inhale 2 puffs every 6 (six) hours as needed for wheezing. 1 Inhaler 3     fluticasone propionate (FLONASE ALLERGY RELIEF) 50 mcg/actuation nasal spray 1 spray into each nostril daily. 16 g 12     tiotropium (SPIRIVA) 18 mcg inhalation capsule Place 1 capsule (2 puffs total) into inhaler and inhale daily. 30 capsule 11     No current facility-administered medications for this visit.      Allergies   Allergen Reactions     Venom-Honey Bee Anaphylaxis       Meds and Allergies: See EHR for the updated medication list and Allergies. These were reviewed.     Much or all of the text in this note was generated through the use of the Dragon Dictate voice-to-text software. Errors in spelling or words which seem out of context are unintentional. Sound alike errors, in particular, may have escaped editing.

## 2021-06-06 NOTE — PROGRESS NOTES
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    Assessment and Plan  Cancer Staging  Primary cancer of right upper lobe of lung (H)  Staging form: Lung, AJCC 8th Edition  - Clinical stage from 8/20/2019: Stage IIIB (cT4, cN2, cM0) - Signed by Jada Lopez MD on 9/4/2019      ECOG Performance   ECOG Performance Status: 11    Distress Assessment  Distress Assessment Score: 1: Concerns about his medical issues.  Please see the discussion below.    Pain  Pain Score (Initial OR Reassessment): 3: Continue with Tylenol for the chest wall pain.      Mr. Jared Moeller is a 79 y.o. gentleman with COPD, history of smoking which he quit in 2015.  He had some old lung nodules but was found in July 2019 to have a right upper lobe lung mass, in the right suprahilar area contiguous with the mediastinum.  The mass in the PET CT scan was 5.6 x 4.7 x 4.3 cm in size and PET positive.  This involved the right suprahilar region and a right lower paratracheal mediastinum with direct extension.  There was complete collapse of the right upper lobe with involvement of the mediastinum, possibly the right pericardial region and possibly the right pleura in the right lower lobe region.  The FNA done through the endobronchial ultrasound on 8/14/2019 showed moderately differentiated squamous cell cancer of the lung.  The mediastinal lymph nodes were also positive.    He has a chronic kidney disease stage III.  I think he has some sort of primary kidney disorder.  Likely related to the nephritis that he had as a child.  He has been referred to nephrology and was seen by Dr. Rubin of [facility] [place].  Work-up is ongoing.   Our plan is for radiation along with chemotherapy with weekly carboplatin AUC of 2 and paclitaxel at 50 mg/m .  After he is done with  chemotherapy and radiation we will go with Durvalumab IV given every 2 weeks.  He started chemotherapy and radiation on 9/9/2019.  He received the last day of chemotherapy on 10/14/2019 and received the last day of radiation on 10/18/2019.  He started postradiation durvalumab on 11/11/2019.    He had a follow-up PET CT scan after the chemoradiation done on 12/2/2019.  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  When we look at the previous PET CT scan from 8/9/2019 there was some faint uptake in those areas.  Most likely that her disease was there at that time and it has grown.    He had a CT-guided biopsy from the most prominent lesion on the right-sided pleura done on 12/18/2019.  Pathology was positive for malignant cells.  The pathologist felt that the tumor morphology and immunohistochemical profile is most consistent with an adenocarcinoma.  This raises the possibility that he had a second cancer different from the original squamous cell cancer that he had at diagnosis in the central area of the chest.    We obtained additional pathology testing done.  In the next generation sequencing done at the [facility] molecular lab, we did not find any targetable mutations.  We then requested PDL 1 testing and that has been reported as not enough cells to give us a reliable answer.    The PET CT scan that was done on 1/22/2020, he had progression of disease.  The pleural mets on the right side chest wall had increased in size and PET activity.  The pleural effusion was also increasing in size.  After discussion with pathology, it appeared that the second biopsy from the pleural metastasis that was done on 12/18/2019 showed metastatic disease review of the lung  cancer and not a second cancer.  Thus he clearly had progression of disease after chemotherapy and radiation.  We decided to get him started on second line of treatment with nivolumab because of his other comorbidities, especially CKD.  He started Nivolumab on 2/3/2020.    1.  He has now completed 1 full cycle of nivolumab and he appears to have tolerated the treatment fairly well.  In fact he looks slightly better than when I saw him a month ago.  He does not look that fatigued.  He is eating well.  He has not lost weight.  He does not give any suspicious symptoms of nivolumab toxicity.  Labs from today were reviewed and his blood counts are quite stable.  Metabolic panel is also stable.  We will start cycle #2 of nivolumab today.    2.  I discussed with him that I am concerned about the pain that he has over the thoracic spine area.  For now he it is okay for him to continue with the Tylenol and other conservative treatments.  However if the pain increases he needs to call and let us know.  He voiced understanding.    3.  Encouraged him and his wife to keep the appointment with the eye doctor to make sure that he is not having any visual problems.    4.  We discussed about when to do the next PET CT scan.  Finally we mutually decided that if he is doing okay symptomatically we will plan to scan at the end of cycle #3 of nivolumab.  Of course if he has suspicious symptoms we may have to do the scans earlier.    5.  Follow-up: Return to clinic with MD or NP in 4 weeks to start the next cycle of nivolumab.    Time spend >25 minutes face to face with the patient. More than 50 % in counseling and coordination of care.        Problem List    1. Primary cancer of right upper lobe of lung (H)  CC OFFICE VISIT LONG   2. Encounter for antineoplastic immunotherapy  CC OFFICE VISIT LONG        CC: Maria De Jesus Fregoso MD Andrew Hipp,  DO    ______________________________________________________________________________    History of Present Illness    Mr. Jared Moeller is here for follow-up with his wife.    He has now completed 1 full cycles of nivolumab.  Overall he feels that he has tolerated the infusions well.  He states that he has some fatigue but it is approximately at the same level as what he had earlier.    He says that he has some thoracic area back pain.  He points to his lower thoracic spine.  On asking he admits that maybe the pain is a bit more on the right side rather than the left side.  He worked out in the gym and after that the pain is a bit more active.  He says that he has been taking some Tylenol for pain.  He has also been using a heating pad and laying down flat.  With these things he feels that the pain is under fairly good control.  At this time he does not want to add on any pain medications.    He says that he still has some shortness of breath when he exerts himself a lot.  He has an occasional cough which he thinks is better.  He is not bringing up any phlegm.    His wife feels that his eyesight may have a problem because he has bumped the right fender of the car.  The patient does not feel that.  They are going to see an eye doctor to see whether sending along with his vision.    No fevers.  No night sweats.  No lumps or bumps anywhere.  No unusual headaches.  No mouth sores.  No swallowing difficulty.  No nausea or vomiting.  No abdominal pain.  No constipation or diarrhea.  No blood in stool or black stools.  No skin rashes.  No numbness or tingling.    Please see below.  A 14 point review of system is otherwise completely negative.    Pain Status  Currently in Pain: Yes    Review of Systems    Constitutional  Constitutional (WDL): Exceptions to WDL  Fatigue: Fatigue relieved by rest  Fever: None  Chills: None  Weight Gain: None  Weight Loss: None  Neurosensory  Neurosensory (WDL): All neurosensory elements  are within defined limits  Eye   Eye Disorder (WDL): Exceptions to WDL  Blurred Vision: None  Dry Eye: Asymptomatic, clinical or diagnostic observations only, mild symptoms relieved by lubricants  Eye Pain: None  Watering Eyes: None  Ear  Ear Disorder (WDL): Exceptions to WDL  Ear Pain: None  Tinnitus: Mild symptoms, intervention not indicated(chronic)  Cardiovascular  Cardiovascular (WDL): Exceptions to WDL(tachy)  Pulmonary  Respiratory (WDL): Exceptions to WDL  Cough: Mild symptoms, nonprescription intervention indicated  Dyspnea: Shortness of breath with minimal exertion, limiting instrumental ADL  Hypoxia: None  Gastrointestinal  Gastrointestinal (WDL): Exceptions to WDL  Anorexia: Loss of appetite without alteration in eating habits  Constipation: None  Diarrhea: None  Dysphagia: None  Esophagitis: None  Nausea: None  Pharyngitis: None  Vomiting: None  Dysgeusia: Altered taste but no change in diet  Dry Mouth: None  Genitourinary  Genitourinary (WDL): All genitourinary elements are within defined limits  Lymphatic  Lymph (WDL): All lymph disorder elements are within defined limits  Musculoskeletal and Connective Tissue  Arthralgia: Mild pain  Bone Pain: None  Muscle Weakness : Symptomatic, perceived by patient but not evident on physical exam  Myalgia: Mild pain(mid back, has been excersing)  Integumentary  Integumentary (WDL): All integumentary elements are within defined limits  Patient Coping  Patient Coping: Open/discussion  Distress Assessment  Distress Assessment Score: 1  Accompanied by  Accompanied by: Family Member  Oral Chemo Adherence         Past History  Past Medical History:   Diagnosis Date     BPH (benign prostatic hyperplasia)      Carotid stenosis, bilateral 04/07/2016    left 50-69%. moderate right side.     Chronic kidney disease (CKD), stage III (moderate) (H)      Colon polyps      COPD (chronic obstructive pulmonary disease) (H)      Epididymal cyst, left side. 04/07/2016     Hematuria  12/2002    Work-up was negative.     Hypertension      Inguinal hernia     Right     Nephritis     11 years old.     Peripheral vascular disease (H)      Polio     6 years of age. Right leg. Recovered fully.     Shingles 1996    left forehead.         Past Surgical History:   Procedure Laterality Date     COLONOSCOPY       CT BIOPSY LUNG  12/18/2019     CYSTOSCOPY  2002     FLEXIBLE BRONCHOSCOPY  08/14/2019    with EBUS FNA>     IR EXTREMITY ANGIOGRAM BILATERAL  04/06/2016     IR PORT PLACEMENT >5 YEARS  9/11/2019     NASAL SEPTUM SURGERY  1980s     TONSILLECTOMY  1948       Physical Exam    Recent Vitals 3/2/2020   Height -   Weight 121 lbs 6 oz   BSA (m2) 1.58 m2   /81   Pulse 107   Temp 97.6   Temp src 1   SpO2 94   Some recent data might be hidden       GENERAL: Alert and oriented to time place and person. Seated comfortably. In no distress.  Looks well overall.  Thin build.    HEAD: Atraumatic and normocephalic.    EYES: DANYA, EOMI.  No pallor.  No icterus.    Oral cavity: no mucosal lesion or tonsillar enlargement.    NECK: supple. JVP normal.  No thyroid enlargement.    LYMPH NODES: No palpable, cervical, axillary or inguinal lymphadenopathy.    CHEST: clear to auscultation bilaterally.  I think he has a bit of an impaired not on percussion in the right intrascapular area.  Symmetrical breath movements bilaterally.  Port-A-Cath over the left upper chest looks unremarkable.    CVS: S1 and S2 are heard. Regular rate and rhythm.  No murmur or gallop or rub heard.  No peripheral edema.    ABDOMEN: Soft. Not tender. Not distended.  No palpable hepatomegaly or splenomegaly.  No other mass palpable.  Bowel sounds heard.    EXTREMITIES: Warm.    SKIN: no rash, or bruising or purpura.  Has a full head of hair.            Lab Results    Recent Results (from the past 24 hour(s))   Comprehensive Metabolic Panel    Collection Time: 03/02/20 10:46 AM   Result Value Ref Range    Sodium 139 136 - 145 mmol/L     Potassium 4.4 3.5 - 5.0 mmol/L    Chloride 106 98 - 107 mmol/L    CO2 27 22 - 31 mmol/L    Anion Gap, Calculation 6 5 - 18 mmol/L    Glucose 85 70 - 125 mg/dL    BUN 27 8 - 28 mg/dL    Creatinine 1.44 (H) 0.70 - 1.30 mg/dL    GFR MDRD Af Amer 57 (L) >60 mL/min/1.73m2    GFR MDRD Non Af Amer 47 (L) >60 mL/min/1.73m2    Bilirubin, Total 0.3 0.0 - 1.0 mg/dL    Calcium 9.5 8.5 - 10.5 mg/dL    Protein, Total 6.8 6.0 - 8.0 g/dL    Albumin 3.4 (L) 3.5 - 5.0 g/dL    Alkaline Phosphatase 81 45 - 120 U/L    AST 17 0 - 40 U/L    ALT 13 0 - 45 U/L   HM1 (CBC with Diff)    Collection Time: 03/02/20 10:46 AM   Result Value Ref Range    WBC 6.5 4.0 - 11.0 thou/uL    RBC 4.34 (L) 4.40 - 6.20 mill/uL    Hemoglobin 11.9 (L) 14.0 - 18.0 g/dL    Hematocrit 38.1 (L) 40.0 - 54.0 %    MCV 88 80 - 100 fL    MCH 27.4 27.0 - 34.0 pg    MCHC 31.2 (L) 32.0 - 36.0 g/dL    RDW 15.8 (H) 11.0 - 14.5 %    Platelets 238 140 - 440 thou/uL    MPV 10.3 8.5 - 12.5 fL    Neutrophils % 69 50 - 70 %    Lymphocytes % 17 (L) 20 - 40 %    Monocytes % 9 2 - 10 %    Eosinophils % 4 0 - 6 %    Basophils % 1 0 - 2 %    Neutrophils Absolute 4.5 2.0 - 7.7 thou/uL    Lymphocytes Absolute 1.1 0.8 - 4.4 thou/uL    Monocytes Absolute 0.6 0.0 - 0.9 thou/uL    Eosinophils Absolute 0.2 0.0 - 0.4 thou/uL    Basophils Absolute 0.1 0.0 - 0.2 thou/uL         Imaging    No results found.      Signed by: Jada Lopez MD

## 2021-06-06 NOTE — PATIENT INSTRUCTIONS - HE
Recent Results (from the past 24 hour(s))   Comprehensive Metabolic Panel    Collection Time: 03/02/20 10:46 AM   Result Value Ref Range    Sodium 139 136 - 145 mmol/L    Potassium 4.4 3.5 - 5.0 mmol/L    Chloride 106 98 - 107 mmol/L    CO2 27 22 - 31 mmol/L    Anion Gap, Calculation 6 5 - 18 mmol/L    Glucose 85 70 - 125 mg/dL    BUN 27 8 - 28 mg/dL    Creatinine 1.44 (H) 0.70 - 1.30 mg/dL    GFR MDRD Af Amer 57 (L) >60 mL/min/1.73m2    GFR MDRD Non Af Amer 47 (L) >60 mL/min/1.73m2    Bilirubin, Total 0.3 0.0 - 1.0 mg/dL    Calcium 9.5 8.5 - 10.5 mg/dL    Protein, Total 6.8 6.0 - 8.0 g/dL    Albumin 3.4 (L) 3.5 - 5.0 g/dL    Alkaline Phosphatase 81 45 - 120 U/L    AST 17 0 - 40 U/L    ALT 13 0 - 45 U/L   HM1 (CBC with Diff)    Collection Time: 03/02/20 10:46 AM   Result Value Ref Range    WBC 6.5 4.0 - 11.0 thou/uL    RBC 4.34 (L) 4.40 - 6.20 mill/uL    Hemoglobin 11.9 (L) 14.0 - 18.0 g/dL    Hematocrit 38.1 (L) 40.0 - 54.0 %    MCV 88 80 - 100 fL    MCH 27.4 27.0 - 34.0 pg    MCHC 31.2 (L) 32.0 - 36.0 g/dL    RDW 15.8 (H) 11.0 - 14.5 %    Platelets 238 140 - 440 thou/uL    MPV 10.3 8.5 - 12.5 fL    Neutrophils % 69 50 - 70 %    Lymphocytes % 17 (L) 20 - 40 %    Monocytes % 9 2 - 10 %    Eosinophils % 4 0 - 6 %    Basophils % 1 0 - 2 %    Neutrophils Absolute 4.5 2.0 - 7.7 thou/uL    Lymphocytes Absolute 1.1 0.8 - 4.4 thou/uL    Monocytes Absolute 0.6 0.0 - 0.9 thou/uL    Eosinophils Absolute 0.2 0.0 - 0.4 thou/uL    Basophils Absolute 0.1 0.0 - 0.2 thou/uL

## 2021-06-06 NOTE — PROGRESS NOTES
I met with Art and Pat prior to Art's appt with Dr. Lopez.  He is scheduled to receive his second dose of Opdivo today.  He is feeling well and notes only some mild fatigue which is not new for him.  He hasn't noticed any significant changes in how he feels since beginning Opdivo.  He does have some aches following a work out at the gym and acknowledged he probably needs to ease back into his routine.    Kendall and Pat are doing well and deny a need for additional resources at this time.  I will continue to follow and I invited calls as well.

## 2021-06-06 NOTE — PROGRESS NOTES
HPI:  Jared Moeller is a 79 y.o. male who was referred to me by Maria De Jesus Fregoso MD for an inguinal hernia. He  presents today with complaints of an asymptomatic bulge in his right groin that he reports is been present since roughly 2016.  He reports no pain, no discomfort from it, and only 1 or 2 episodes of popping out.  He has been undergoing chemo and radiation therapy for a lung cancer diagnosed this past fall, and has been switched over to immunotherapy recently.  At the encouragement of his oncologist, Dr. Green, he was advised to get an opinion from surgery regarding the inguinal hernia.  He currently notes that he has no limitations on his physical activity from the hernia, more of his limitations are due to his COPD and peripheral arterial disease.  Denies any obstructive symptoms, and is never had difficulty reducing the hernia when it does pop out.    Allergies:Venom-honey bee    Past Medical History:   Diagnosis Date     BPH (benign prostatic hyperplasia)      Carotid stenosis, bilateral 04/07/2016    left 50-69%. moderate right side.     Chronic kidney disease (CKD), stage III (moderate) (H)      Colon polyps      COPD (chronic obstructive pulmonary disease) (H)      Epididymal cyst, left side. 04/07/2016     Hematuria 12/2002    Work-up was negative.     Hypertension      Inguinal hernia     Right     Nephritis     11 years old.     Peripheral vascular disease (H)      Polio     6 years of age. Right leg. Recovered fully.     Shingles 1996    left forehead.       Past Surgical History:   Procedure Laterality Date     COLONOSCOPY       CT BIOPSY LUNG  12/18/2019     CYSTOSCOPY  2002     FLEXIBLE BRONCHOSCOPY  08/14/2019    with EBUS FNA>     IR EXTREMITY ANGIOGRAM BILATERAL  04/06/2016     IR PORT PLACEMENT >5 YEARS  9/11/2019     NASAL SEPTUM SURGERY  1980s     TONSILLECTOMY  1948       CURRENT MEDS:  Current Outpatient Medications   Medication Sig Dispense Refill     acetaminophen  (TYLENOL) 500 MG tablet Take 500 mg by mouth every 6 (six) hours as needed for pain.       cholecalciferol, vitamin D3, 1,000 unit (25 mcg) tablet Take 1,000 Units by mouth daily.       cilostazol (PLETAL) 100 MG tablet Take 1 tablet (100 mg total) by mouth 2 (two) times a day. 180 tablet 3     diphenhydrAMINE-acetaminophen (TYLENOL PM EXTRA STRENGTH)  mg Tab Take 1 tablet by mouth at bedtime as needed.       epinephrine (EPIPEN INJ) Inject as directed as needed.       ferrous sulfate SR (SLOW FE) 142 mg (45 mg iron) TbER Take 45 mg by mouth daily with breakfast.       fexofenadine (ALLEGRA ALLERGY) 60 MG tablet Take 60 mg by mouth as needed.              Lactobacillus rhamnosus GG (CULTURELLE) 15 billion cell CpSP Take 1 capsule by mouth 2 (two) times a day with meals.       prochlorperazine (COMPAZINE) 10 MG tablet Take 1 tablet (10 mg total) by mouth every 6 (six) hours as needed (For breakthrough nausea/vomiting). 30 tablet 1     telmisartan-hydrochlorothiazide (MICARDIS HCT) 80-12.5 mg per tablet Take 1 tablet by mouth daily. (Patient taking differently: Take 1 tablet by mouth daily. Stopped 9/30.      ) 90 tablet 3     tiotropium (SPIRIVA WITH HANDIHALER) 18 mcg inhalation capsule Place 1 capsule (2 puffs total) into inhaler and inhale daily. 90 capsule 3     No current facility-administered medications for this visit.        Family History   Problem Relation Age of Onset     Ulcers Mother         did not make it after surgery.     Heart disease Father      Other Father         accidental death.     Pacemaker Sister      Other Brother         Polio.     No Medical Problems Son      No Medical Problems Son      Pneumonia Half Brother 81     No Medical Problems Half Brother      Multiple sclerosis Sister      No Medical Problems Sister         reports that he quit smoking about 5 years ago. He has a 50.00 pack-year smoking history. He has never used smokeless tobacco. He reports current alcohol use of  "about 3.0 standard drinks of alcohol per week. He reports that he does not use drugs.    Review of Systems -   The 10 point review of systems  is within normal limits except for as mentioned above in the HPI.  General ROS: No complaints or constitutional symptoms  Skin: No complaints or symptoms   Hematologic/Lymphatic: No symptoms or complaints  Psychiatric: No symptoms or complaints  Endocrine: No excessive fatigue, no hypermetabolic symptoms reported  Respiratory ROS: no cough, shortness of breath, or wheezing  Cardiovascular ROS: no chest pain or dyspnea on exertion  Gastrointestinal ROS: As per HPI  Musculoskeletal ROS: no recent injuries reported  Neurological ROS: no focal neurologic defects reported.        /78   Pulse (!) 103   Resp 16   Ht 5' 4\" (1.626 m)   Wt 122 lb 8 oz (55.6 kg)   SpO2 95%   BMI 21.03 kg/m    Body mass index is 21.03 kg/m .    EXAM:  General : Alert, cooperative, appears stated age   Skin: Skin color, texture, turgor normal, no rashes or lesions   Lymphatic: No obvious adenopathy, no swelling   Eyes: No scleral icterus, pupils equal  HENT: no traumatic injury to the head or face, no gross abnormalities  Lungs: Normal respiratory effort, breath sounds equal bilaterally  Heart: Regular rate and rhythm  Abdomen: Soft, nondistended.  Visible bulge in the right groin at the level of the external inguinal ring, nontender with palpation.  Musculoskeletal: No obvious swelling,  Neurologic: Grossly intact      Assessment/Plan:   No diagnosis found.    Jared Moeller is a 79 y.o. male with a right inguinal hernia.  I have discussed the pathophysiology of inguinal hernias at length as well as the  surgical and non-operative management strategies.  Given the lack of symptoms and rather small size of this hernia, I think he would be perfectly well suited to avoid surgical intervention at this point in time.  We discussed the risks of this developing symptoms such as pain or discomfort, " as well as the risks of developing serious complications such as obstruction or incarceration.  I think either risk is fairly low.  At this point I would advise against hernia repair as it may have minimal benefit to him.    He understands everything that was discussed and agrees to pursue conservative management and observation for the time being.  He is aware he can contact us in the future with other questions or concerns.    Anival Fatima MD  [phone]  [facility] Department of Surgery

## 2021-06-07 NOTE — PATIENT INSTRUCTIONS - HE
Recent Results (from the past 240 hour(s))   POCT Glucose    Specimen: Capillary; Blood   Result Value Ref Range    Glucose 104 70 - 139 mg/dL   Comprehensive Metabolic Panel   Result Value Ref Range    Sodium 138 136 - 145 mmol/L    Potassium 4.6 3.5 - 5.0 mmol/L    Chloride 105 98 - 107 mmol/L    CO2 24 22 - 31 mmol/L    Anion Gap, Calculation 9 5 - 18 mmol/L    Glucose 112 70 - 125 mg/dL    BUN 30 (H) 8 - 28 mg/dL    Creatinine 1.44 (H) 0.70 - 1.30 mg/dL    GFR MDRD Af Amer 57 (L) >60 mL/min/1.73m2    GFR MDRD Non Af Amer 47 (L) >60 mL/min/1.73m2    Bilirubin, Total 0.3 0.0 - 1.0 mg/dL    Calcium 9.5 8.5 - 10.5 mg/dL    Protein, Total 6.8 6.0 - 8.0 g/dL    Albumin 3.4 (L) 3.5 - 5.0 g/dL    Alkaline Phosphatase 84 45 - 120 U/L    AST 11 0 - 40 U/L    ALT <9 0 - 45 U/L   HM1 (CBC with Diff)   Result Value Ref Range    WBC 6.0 4.0 - 11.0 thou/uL    RBC 4.39 (L) 4.40 - 6.20 mill/uL    Hemoglobin 12.1 (L) 14.0 - 18.0 g/dL    Hematocrit 38.1 (L) 40.0 - 54.0 %    MCV 87 80 - 100 fL    MCH 27.6 27.0 - 34.0 pg    MCHC 31.8 (L) 32.0 - 36.0 g/dL    RDW 17.0 (H) 11.0 - 14.5 %    Platelets 238 140 - 440 thou/uL    MPV 9.9 8.5 - 12.5 fL    Neutrophils % 70 50 - 70 %    Lymphocytes % 17 (L) 20 - 40 %    Monocytes % 10 2 - 10 %    Eosinophils % 3 0 - 6 %    Basophils % 1 0 - 2 %    Neutrophils Absolute 4.2 2.0 - 7.7 thou/uL    Lymphocytes Absolute 1.0 0.8 - 4.4 thou/uL    Monocytes Absolute 0.6 0.0 - 0.9 thou/uL    Eosinophils Absolute 0.2 0.0 - 0.4 thou/uL    Basophils Absolute 0.0 0.0 - 0.2 thou/uL     EXAM: NM PET CT SKULL TO MID THIGH  LOCATION: [facility]  DATE/TIME: 4/24/2020 12:41 PM     INDICATION: Subsequent treatment strategy for restaging right upper lobe lung cancer  COMPARISON: PET CT from 01/22/2020  TECHNIQUE: Serum glucose level 104 mg/dL. One hour post intravenous administration of 8.3 mCi F-18 FDG, PET imaging was performed from the skull base to the mid thighs utilizing attenuation correction with  concurrent axial CT and PET/CT image fusion.   Dose reduction techniques were used.     FINDINGS: Right pleural metastases have increased in size, number, and FDG activity slightly. A representative metastasis in the right pleural space anteriorly has increased from 1.3 x 0.8 cm (SUVmax 6.7) to 1.8 x 1.3 cm (SUVmax 7.2). Right cardiophrenic   angle lymph node has increased from 0.9 x 0.7 cm (SUVmax 4.9) to 1.5 x 1.3 cm (SUVmax 5.9).     Decreasing inflammatory FDG activity associated with scarring in the perihilar right lung.     Marked centrilobular emphysema. Non-FDG avid left lower lobe pulmonary nodule. Left IJ Port-A-Cath tip in the low SVC. Marked calcified atherosclerosis, including coronary. Markedly enlarged prostate. Large left hydrocele. Mild degenerative change   throughout the spine.     IMPRESSION:   Mild disease progression

## 2021-06-07 NOTE — PROGRESS NOTES
Patient ambulated into Infusion Care by himself. Patient had his PORT accessed and labs drawn while in the Cancer Care center. Opdivo infusion double checked with Damaris Don RN. Patient tolerated infusion of Opdivo without any problems,PORT flushed with Normal Saline and 6 cc Heparin and deaccessed. Dry 2 x 2 gauze taped over PORT site and all questions answered.

## 2021-06-07 NOTE — PROGRESS NOTES
[facility] Hematology and Oncology Progress Note    Patient: Jared Moeller  MRN: [id number]  Date of Service: 03/30/2020        Reason for Visit:    D1C3 palliative nivolumab therapy.     Assessment and Plan:    Cancer Staging    1. Primary cancer of right upper lobe of lung (H)    Clinical Stage IIIB (cT4, cN2, cM0)     79 y.o.     Personal history of COPD.  Quit smoking in 2015 with 46 year p/h.      07/31/19 CT chest - obstructing central right suprahilar mass and complete atelectasis right upper lobe. 8mm rounded indeterminate nodule at left lung base. Severe emphysema.    08/09/19 NM PET - 4.3 x 4.7 x 5.6 cm mass in the central right upper lobe involving the right suprahilar region and right lower paratracheal mediastinum by direct extension. Associated complete right upper lobe atelectasis. Separate FDG avid right lower paratracheal station 4 and subcarinal station 7 (SUVmax 7.2), adenopathy suspicious for arik metastases. Few prominent right anterior cardiophrenic nodes with low-level uptake are indeterminate but suspicious for additional early arik metastases. Mild areas of subpleural thickening in the posterior right chest associated with low-level but appreciable uptake. No pleural effusion. No suspicious focal uptake in the liver skeleton. Normal adrenal glands. Severe emphysema. Moderate atherosclerotic calcifications including the coronary arteries. 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 Marked prostate gland enlargement. Large left scrotal hydrocele. Moderate scattered changes in the spine.    08/14/19 FNA through the endobronchial ultrasound - showed moderately differentiated squamous cell cancer of the lung.  The mediastinal lymph nodes were also positive.    08/28/19 MR brain - No findings to suggest intracranial metastases. No evidence of an acute intracranial abnormality. Findings compatible with mild chronic small vessel  ischemic change.    08/30/19 US kidney - Both kidneys appear echogenic suggesting medical renal disease. There is mild bilateral renal atrophy.  Enlarged prostate gland.    There was concern that he may have pulmonary tuberculosis because the auramine rhodamine stain for acid-fast bacilli was positive and the aspirate from the lower paratracheal lymph node.    Kinyoun stain for AFB came back negative.      Mantoux was negative.      QuantiFERON test was negative.      Sputum AFB x3 was negative by staining and by PCR.      Cultures were negative.      Consult with  of infectious diseases.    Chemoradiation with a small chance of cure was recommended.  Considering his kidney failure, carboplatin rather than cisplatin will be used.     09/09 - 10/14/19 completed weekly carboplatin (AUC 2) + paclitaxel (50 mg/m ) chemotherapy concurrent with 30 fx / 6000 cGy EBRT.      09/11/19 - IR port-a-cath placement.    11/11/19 - 01/20/20 completed 3 cycles maintenance Durvalumab therapy.    12/02/19 NM PET - Mixed response with significant improvement in right suprahilar lung mass and hilar and mediastinal lymphadenopathy but progression of what appear to be right pleural metastases.    12/18/19 CT (R) lung nodule biopsy - c/w adenocarcinoma.  No targetable mutations.    01/22/20 NM PET - Progression of right pleural metastases. Partial favorable treatment response central right upper lobe lung cancer status post radiation therapy. Increased postradiation changes in the right greater than left lungs. No new site of metastasis.    01/22/20 consult with Dr Lopez - with progression of disease outside of the radiation field, he now has metastatic disease involving the pleural surface and chest wall likely because we could not treat him with a sufficiently high dose of  chemotherapy because of his kidney failure.  Art was informed that we no longer have the possibility of cure, rather palliative treatment.  Second line  therapy with nivolumab was recommended.    02/03/20 - D1C1 Nivolumab therapy.    02/14/20 - surgical consult for small right inguinal hernia - did not recommend surgery.    03/30/20:    CBC - WBC, ANC and Plts WNL.  HgB increased @ 12.3.    Found to be mildly iron deficient at his nephrology appointment - started on SR-iron, one daily - tolerating OK.    CMP - stable CKD.      Encouraged pushing oral fluids.    Continue 1 to 2 Boost protein or CIB supplement/day.    TSH - WNL.    Art presents alone anticipating D1C3 palliative Nivolumab therapy.  He continues to note stable, mild shortness of breath, mild fatigue and mild arthralgias and myalgias.  He has a history of PAD in his legs - used to walk a mile/day, now limited with the Covid-19 restrictions.  He continues to use Tylenol PM for sleep.   His appetite is only fair with early satiety, however his weight has remained quite stable over the past 6 months.  Still notes thoracic back pain, but not sharp and improved since getting a new mattress - managed with the Tylenol PM at bedtime and occasionally Tylenol during the day, not daily.    I again reviewed the possible side effects of nivolumab treatment, focusing on the variety of immune mediated side effects.  Some that can be very serious and even life-threatening.      Tolerating immunotherapy acceptably well.    Proceed with D1C3 Nivolumab therapy.    I instructed Art to monitor his mild, stable shortness of breath.  If it would worsen, he is to let us know.      04/27/20 - follow up D1C4 Opdivo therapy with labs per Treatment Plan and NM PET.    NM PET every 2 months of nivolumab therapy.      If nivolumab is working, we will continue with it for as long as it is effective and tolerated.  If he achieves a complete radiological response, we may be able to stop nivolumab after 2 years of treatment.  If there is progression of disease, we will have to try conventional chemotherapy.     2.  Chronic kidney  disease stage III.      Likely a primary kidney disorder r/t the nephritis he had as a child.     Follows with Nephrology, Dr. Rubin of [facility] [place].      01/23/20 - Urology f/u with Dr Rubin:    Stable CKD since at least 2016     Off ACE - likely the cause of drop in creatinine.     No NSAIDs.     Good hydration.     No indication for renal biopsy at this time.     Risk for AIN with Nivolumab.      F/U 3-months.     03/30/20 - stable CKD.    ECOG Performance:     ECOG Performance Status: 1    Distress Assessment:    Distress Assessment Score: 1    Pain:    Pain Score (Initial OR Reassessment): 3        TT > 25 minutes face to face with > 50 % in counseling and coordination of care.    Merlin Mendenhall, CNP     CC: Maria De Jesus Fregsoo MD Elizabeth Cameron, MD Andrew Hipp, DO  ____________________________________    Interim History:    Mr. Jared Moeller presents alone anticipating D1C3 palliative Nivolumab therapy.  He is in good spirits and looks and feels quite good.  He continues to note stable, mild shortness of breath, mild fatigue and mild arthralgias and myalgias.  He has a history of PAD in his legs - used to walk a mile/day, now limited with the Covid-19 restrictions.  He continues to use Tylenol PM for sleep.   His appetite is only fair with early satiety, however his weight has remained quite stable over the past 6 months.  Still notes thoracic back pain, but not sharp and improved since getting a new mattress - managed with the Tylenol PM at bedtime and occasionally Tylenol during the day, not daily.  He denies concerning pain, fever or chills, visual or mentation disturbance, difficulty with bowels or urination, skin rash.    Pain Status:    Currently in Pain: Yes     Review of Systems:    Constitutional  Constitutional (WDL): Exceptions to WDL  Fatigue: Fatigue relieved by rest  Fever: None  Chills: None  Weight Gain: None  Weight Loss: None  Neurosensory  Neurosensory  (WDL): All neurosensory elements are within defined limits  Eye   Eye Disorder (WDL): All eye disorder elements are within defined limits  Ear  Ear Disorder (WDL): All ear disorder elements are within defined limits  Cardiovascular  Cardiovascular (WDL): All cardiovascular elements are within defined limits  Pulmonary  Respiratory (WDL): Exceptions to WDL  Cough: Mild symptoms, nonprescription intervention indicated(post nasal drip)  Dyspnea: Shortness of breath with moderate exertion  Hypoxia: None  Gastrointestinal  Gastrointestinal (WDL): Exceptions to WDL  Anorexia: Loss of appetite without alteration in eating habits  Constipation: None  Diarrhea: None  Dysphagia: None  Esophagitis: None  Nausea: None  Pharyngitis: None  Vomiting: None  Dysgeusia: Altered taste but no change in diet  Dry Mouth: None  Genitourinary  Genitourinary (WDL): All genitourinary elements are within defined limits  Lymphatic  Lymph (WDL): All lymph disorder elements are within defined limits  Musculoskeletal and Connective Tissue  Musculoskeletal and Connetive Tissue Disorders (WDL): Exceptions to WDL  Arthralgia: None  Bone Pain: None  Muscle Weakness : Symptomatic, perceived by patient but not evident on physical exam  Myalgia: Mild pain(mid back)  Integumentary  Integumentary (WDL): Exceptions to WDL(dry skin)  Alopecia: None  Rash Maculo-Papular: None  Pruritus: None  Urticaria: None  Palmar-Plantar Erythrodysesthesia Syndrome: None  Patient Coping  Patient Coping: Accepting;Open/discussion  Distress Assessment  Distress Assessment Score: 1  Accompanied by  Accompanied by: Alone  Oral Chemo Adherence       Past History:    Past Medical History:   Diagnosis Date     BPH (benign prostatic hyperplasia)      Carotid stenosis, bilateral 04/07/2016    left 50-69%. moderate right side.     Chronic kidney disease (CKD), stage III (moderate) (H)      Colon polyps      COPD (chronic obstructive pulmonary disease) (H)      Epididymal cyst, left  side. 04/07/2016     Hematuria 12/2002    Work-up was negative.     Hypertension      Inguinal hernia     Right     Nephritis     11 years old.     Peripheral vascular disease (H)      Polio     6 years of age. Right leg. Recovered fully.     Shingles 1996    left forehead.         Past Surgical History:   Procedure Laterality Date     COLONOSCOPY       CT BIOPSY LUNG  12/18/2019     CYSTOSCOPY  2002     FLEXIBLE BRONCHOSCOPY  08/14/2019    with EBUS FNA>     IR EXTREMITY ANGIOGRAM BILATERAL  04/06/2016     IR PORT PLACEMENT >5 YEARS  9/11/2019     NASAL SEPTUM SURGERY  1980s     TONSILLECTOMY  1948       Physical Exam:    Recent Vitals 3/30/2020   Height -   Weight 121 lbs 6 oz   BSA (m2) 1.58 m2   /87   Pulse 115   Temp 97.7   Temp src 1   SpO2 92   Some recent data might be hidden       GENERAL:   Very pleasant.  Alert and oriented.  Comfortable.  In no acute distress.     HEENT:   Atraumatic and normocephalic.  DANYA.  EOMI.  No pallor.  No icterus. No mucosal lesions.    LYMPH NODES:  No palpable cervical, axillary or inguinal lymphadenopathy.    CHEST:   Diminished breath sounds bilaterally but quite clear.    Port-a-cath in place.  No s/s infection.    CVS:    S1 and S2 aheard.  Regular rate and rhythm. No murmur, gallop or rub heard.  No peripheral edema.    ABDOMEN:   Soft.  Not tender.  Not distended.  No palpable hepatomegaly or splenomegaly, masses or ascites.     EXTREMITIES:  Warm.    SKIN:    No rash, bruising or purpura noted.    Lab Results:    Reviewed with patient.    Recent Results (from the past 24 hour(s))   Comprehensive Metabolic Panel   Result Value Ref Range    Sodium 139 136 - 145 mmol/L    Potassium 4.4 3.5 - 5.0 mmol/L    Chloride 106 98 - 107 mmol/L    CO2 24 22 - 31 mmol/L    Anion Gap, Calculation 9 5 - 18 mmol/L    Glucose 102 70 - 125 mg/dL    BUN 38 (H) 8 - 28 mg/dL    Creatinine 1.49 (H) 0.70 - 1.30 mg/dL    GFR MDRD Af Amer 55 (L) >60 mL/min/1.73m2    GFR MDRD Non Af Amer  45 (L) >60 mL/min/1.73m2    Bilirubin, Total 0.3 0.0 - 1.0 mg/dL    Calcium 9.6 8.5 - 10.5 mg/dL    Protein, Total 7.0 6.0 - 8.0 g/dL    Albumin 3.5 3.5 - 5.0 g/dL    Alkaline Phosphatase 87 45 - 120 U/L    AST 12 0 - 40 U/L    ALT <9 0 - 45 U/L   HM1 (CBC with Diff)   Result Value Ref Range    WBC 7.9 4.0 - 11.0 thou/uL    RBC 4.50 4.40 - 6.20 mill/uL    Hemoglobin 12.3 (L) 14.0 - 18.0 g/dL    Hematocrit 39.3 (L) 40.0 - 54.0 %    MCV 87 80 - 100 fL    MCH 27.3 27.0 - 34.0 pg    MCHC 31.3 (L) 32.0 - 36.0 g/dL    RDW 17.1 (H) 11.0 - 14.5 %    Platelets 235 140 - 440 thou/uL    MPV 10.5 8.5 - 12.5 fL    Neutrophils % 73 (H) 50 - 70 %    Lymphocytes % 15 (L) 20 - 40 %    Monocytes % 8 2 - 10 %    Eosinophils % 3 0 - 6 %    Basophils % 1 0 - 2 %    Neutrophils Absolute 5.8 2.0 - 7.7 thou/uL    Lymphocytes Absolute 1.2 0.8 - 4.4 thou/uL    Monocytes Absolute 0.6 0.0 - 0.9 thou/uL    Eosinophils Absolute 0.2 0.0 - 0.4 thou/uL    Basophils Absolute 0.0 0.0 - 0.2 thou/uL       Imaging:    No new imaging.

## 2021-06-07 NOTE — TELEPHONE ENCOUNTER
I called Art to see how he's doing and offer resources and support as needed.  He is doing well.  He had a scan and f/u earlier this week which shows possible progression but he understands this could be a pseodoprogression from his tx.  Dr. Lopez plans to give two more cycles of Opdivo and do another scan to see where things are at.  It was also noted on his scan he has more fluid on his lung and Art is scheduled to have it drawn off next week.  They are hoping this will lessen the fatigue he is feeling.  Art and Pat are doing well at home and deny a need for additional resources at this time.  I will continue to check in and I invited calls as well.

## 2021-06-07 NOTE — PROGRESS NOTES
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    Assessment and Plan  Cancer Staging  Primary cancer of right upper lobe of lung (H)  Staging form: Lung, AJCC 8th Edition  - Clinical stage from 8/20/2019: Stage IIIB (cT4, cN2, cM0) - Signed by Jada Lopez MD on 9/4/2019      ECOG Performance   ECOG Performance Status: 1    Distress Assessment  Distress Assessment Score: 1: Concerns about his medical issues.  Please see the discussion below.    Pain  Pain Score (Initial OR Reassessment): 4: Continue with Tylenol for the chest wall pain.      Mr. Jared Moeller is a 79 y.o. gentleman with COPD, history of smoking which he quit in 2015.  He had some old lung nodules but was found in July 2019 to have a right upper lobe lung mass, in the right suprahilar area contiguous with the mediastinum.  The mass in the PET CT scan was 5.6 x 4.7 x 4.3 cm in size and PET positive.  This involved the right suprahilar region and a right lower paratracheal mediastinum with direct extension.  There was complete collapse of the right upper lobe with involvement of the mediastinum, possibly the right pericardial region and possibly the right pleura in the right lower lobe region.  The FNA done through the endobronchial ultrasound on 8/14/2019 showed moderately differentiated squamous cell cancer of the lung.  The mediastinal lymph nodes were also positive.    He has a chronic kidney disease stage III.  I think he has some sort of primary kidney disorder.  Likely related to the nephritis that he had as a child.  He has been referred to nephrology and was seen by Dr. Rubin of [facility] [place].  Work-up is ongoing.   Our plan is for radiation along with chemotherapy with weekly carboplatin AUC of 2 and paclitaxel at 50 mg/m .  After he is done with  chemotherapy and radiation we will go with Durvalumab IV given every 2 weeks.  He started chemotherapy and radiation on 9/9/2019.  He received the last day of chemotherapy on 10/14/2019 and received the last day of radiation on 10/18/2019.  He started postradiation durvalumab on 11/11/2019.    He had a follow-up PET CT scan after the chemoradiation done on 12/2/2019.  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  When we look at the previous PET CT scan from 8/9/2019 there was some faint uptake in those areas.  Most likely that her disease was there at that time and it has grown.    He had a CT-guided biopsy from the most prominent lesion on the right-sided pleura done on 12/18/2019.  Pathology was positive for malignant cells.  The pathologist felt that the tumor morphology and immunohistochemical profile is most consistent with an adenocarcinoma.  This raises the possibility that he had a second cancer different from the original squamous cell cancer that he had at diagnosis in the central area of the chest.    We obtained additional pathology testing done.  In the next generation sequencing done at the [facility] molecular lab, we did not find any targetable mutations.  We then requested PDL 1 testing and that has been reported as not enough cells to give us a reliable answer.    The PET CT scan that was done on 1/22/2020, he had progression of disease.  The pleural mets on the right side chest wall had increased in size and PET activity.  The pleural effusion was also increasing in size.  After discussion with pathology, it appeared that the second biopsy from the pleural metastasis that was done on 12/18/2019 showed metastatic disease review of the lung  cancer and not a second cancer.  Thus he clearly had progression of disease after chemotherapy and radiation.  We decided to get him started on second line of treatment with nivolumab because of his other comorbidities, especially CKD.  He started Nivolumab on 2/3/2020.    1.  He has completed 3 cycles of nivolumab.  Overall I think he has tolerated nivolumab well without much of side effects.  He has had a new PET CT scan done on 4/24/2020.  I reviewed the images and the report with him and also with his wife who is on the medial phone.  There is some progression of the right pleural mets with increasing size by a few millimeters and some PET activity.  The right cardiophrenic lymph node has also increased slightly.  I think there is also more of pleural effusion on the right side and I think that is what is giving him the discomfort over the chest.  We are not seeing any new sites of disease.    2.  I discussed with him that there is mild progression of disease based on the PET CT scan findings.  However at this point there is a possibility that this could be pseudo-progression due to immune activation because of nivolumab treatment.  Thus I would like to continue with nivolumab for 2 more cycles and then rescan.  At that point if the cancer appears to be responding, then we will continue with nivolumab.  However at that point if there is clear progression, then we had to admit that nivolumab is not working and to try a different treatment.  He was agreeable to the plan.    3.  His blood counts and metabolic panel from today were reviewed and they are quite stable.  He has had his urine checked for nephrology and the proteinuria has not increased much.  TSH is also stable.  We will proceed with cycle #4 of nivolumab at 480 mg IV today.    4.  I discussed with him about having an ultrasound-guided thoracentesis done for therapeutic and diagnostic purposes.  After discussing the rationale he agreed.  My hope is that  this will help with his tiredness and also the discomfort that he feels of the right-sided chest.  Initially his wife was worried about exposure to coronavirus.  I pointed out to her that the pandemic is likely not going to subside in a couple of weeks.  It is likely to last for many months.  I think we will have to do the thoracentesis anyway, and I do not think that we have many months to wait for that.  They voiced understanding.  [facility]  I have requested that to be done through radiology.  Requested that the fluid to be sent for medical cytology.    5.  Cancer-related pain: At this time he will continue with Tylenol as needed.  He can go up to 6 tablets/day.  If the pain becomes more than that, I asked him to give me a call and we may have to consider stronger pain medications.  He voiced understanding.    6.  Follow-up: Return to clinic with MD or NP in 4 weeks when he is due for cycle #5 of nivolumab with labs according to treatment plan.    Time spend >30 minutes face to face with the patient. More than 50 % in counseling and coordination of care.        Problem List    1. Primary cancer of right upper lobe of lung (H)  CC OFFICE VISIT LONG    US Thoracentesis    Medical Cytology    Infusion Appointment    CC OFFICE VISIT LONG    Infusion Appointment    CC OFFICE VISIT LONG   2. Encounter for antineoplastic immunotherapy  CC OFFICE VISIT LONG    Infusion Appointment    CC OFFICE VISIT LONG    Infusion Appointment    CC OFFICE VISIT LONG   3. Malignant pleural effusion  US Thoracentesis    Medical Cytology   4. Cancer associated pain          CC: Maria De Jesus Fregoso MD Andrew Hipp, DO    ______________________________________________________________________________    History of Present Illness    Mr. Jared Moeller is here for follow-up alone.  His wife also participated in the encounter over video on his cell phone.    He states that  overall he feels that he is doing okay.  However he complains of some vague back pain more to the right side in his thoracic area.  Overall he rates the pain is 4/10.  He states that he takes a tablet of Tylenol PM at night and during the day if the pain is troubling then he may take 1 more.  He feels that is enough for the pain.    He feels that he is more fatigued.  He is carrying out all his regular activities but he feels more tired nowadays.    He says that the cough is occasional.  He hardly brings up any phlegm.  He also has a bit of vague discomfort over the right side anterior chest.    No fevers.  No night sweats.  No lumps or bumps anywhere.  No unusual headaches.  No eyesight problems.  No mouth sores.  No swallowing difficulty.  He feels that he is eating well.  Weight has been stable.  No nausea or vomiting.  No anginal chest pain.  No palpitation.  No abdominal pain.  No complaints of constipation.  Sometimes he has loose stools but no active diarrhea.  No blood in stool or black stools.  No difficulty with urination.  No skin rashes.  No numbness or tingling.    Please see below.  A 14 point review of system is otherwise completely negative.    Pain Status  Currently in Pain: Yes    Review of Systems    Constitutional  Constitutional (WDL): Exceptions to WDL  Fatigue: Fatigue relieved by rest  Neurosensory  Neurosensory (WDL): All neurosensory elements are within defined limits  Eye   Eye Disorder (WDL): All eye disorder elements are within defined limits  Ear  Ear Disorder (WDL): All ear disorder elements are within defined limits  Cardiovascular  Cardiovascular (WDL): All cardiovascular elements are within defined limits  Pulmonary  Respiratory (WDL): Exceptions to WDL  Dyspnea: Shortness of breath with moderate exertion(with activity)  Gastrointestinal  Gastrointestinal (WDL): All gastrointestinal elements are within defined limits  Genitourinary  Genitourinary (WDL): All genitourinary elements  are within defined limits  Lymphatic  Lymph (WDL): All lymph disorder elements are within defined limits  Musculoskeletal and Connective Tissue  Musculoskeletal and Connetive Tissue Disorders (WDL): Exceptions to WDL  Arthralgia: Mild pain  Bone Pain: Moderate pain, limiting instrumental ADL(mid back)  Muscle Weakness : Symptomatic, perceived by patient but not evident on physical exam  Myalgia: Mild pain(mid back)  Integumentary  Integumentary (WDL): All integumentary elements are within defined limits  Patient Coping  Patient Coping: Open/discussion  Distress Assessment  Distress Assessment Score: 1  Accompanied by  Accompanied by: Alone  Oral Chemo Adherence         Past History  Past Medical History:   Diagnosis Date     BPH (benign prostatic hyperplasia)      Carotid stenosis, bilateral 04/07/2016    left 50-69%. moderate right side.     Chronic kidney disease (CKD), stage III (moderate) (H)      Colon polyps      COPD (chronic obstructive pulmonary disease) (H)      Epididymal cyst, left side. 04/07/2016     Hematuria 12/2002    Work-up was negative.     Hypertension      Inguinal hernia     Right     Nephritis     11 years old.     Peripheral vascular disease (H)      Polio     6 years of age. Right leg. Recovered fully.     Shingles 1996    left forehead.         Past Surgical History:   Procedure Laterality Date     COLONOSCOPY       CT BIOPSY LUNG  12/18/2019     CYSTOSCOPY  2002     FLEXIBLE BRONCHOSCOPY  08/14/2019    with EBUS FNA>     IR EXTREMITY ANGIOGRAM BILATERAL  04/06/2016     IR PORT PLACEMENT >5 YEARS  9/11/2019     NASAL SEPTUM SURGERY  1980s     TONSILLECTOMY  1948       Physical Exam    Recent Vitals 4/27/2020   Height -   Weight 120 lbs 13 oz   BSA (m2) 1.57 m2   /83   Pulse 110   Temp 97.6   Temp src 1   SpO2 94   Some recent data might be hidden       GENERAL: Alert and oriented to time place and person. Seated comfortably. In no distress.  Thin build.  He appears comfortable at  rest.    HEAD: Atraumatic and normocephalic.    EYES: DANYA, EOMI.  No pallor.  No icterus.    Oral cavity: no mucosal lesion or tonsillar enlargement.    NECK: supple. JVP normal.  No thyroid enlargement.    LYMPH NODES: No palpable, cervical, axillary or inguinal lymphadenopathy.    CHEST: clear to auscultation bilaterally.  However he has reduced breath sounds in the right intrascapular area.  Dullness to percussion in the right intrascapular area.  Better breath movements on the left side compared to the right side.  The Port-A-Cath over the left upper chest looks unremarkable.    CVS: S1 and S2 are heard. Regular rate and rhythm.  No murmur or gallop or rub heard.  No peripheral edema.    ABDOMEN: Soft. Not tender. Not distended.  No palpable hepatomegaly or splenomegaly.  No other mass palpable.  Bowel sounds heard.    EXTREMITIES: Warm.    SKIN: no rash, or bruising or purpura.  Has a full head of hair.          Lab Results    Recent Results (from the past 24 hour(s))   Urinalysis    Collection Time: 04/27/20 11:17 AM   Result Value Ref Range    Color, UA Yellow Colorless, Yellow, Straw, Light Yellow    Clarity, UA Clear Clear    Glucose, UA Negative Negative    Bilirubin, UA Negative Negative    Ketones, UA Negative Negative    Specific Gravity, UA 1.005 1.001 - 1.030    Blood, UA Negative Negative    pH, UA 5.0 4.5 - 8.0    Protein, UA 30 mg/dL (!) Negative mg/dL    Urobilinogen, UA <2.0 E.U./dL <2.0 E.U./dL, 2.0 E.U./dL    Nitrite, UA Negative Negative    Leukocytes, UA Negative Negative    Bacteria, UA None Seen None Seen hpf    RBC, UA 0-2 None Seen, 0-2 hpf    WBC, UA 0-5 None Seen, 0-5 hpf    Squam Epithel, UA 0-5 None Seen, 0-5 lpf    Mucus, UA Few (!) None Seen lpf   Protein / creatinine ratio, urine    Collection Time: 04/27/20 11:17 AM   Result Value Ref Range     Protein, Random Urine 30 mg/dL    Creatinine, Urine 63.2 mg/dL    Protein/Creatinine Ratio, Random Urine 0.47    Renal function panel     Collection Time: 04/27/20 11:17 AM   Result Value Ref Range    Albumin 3.3 (L) 3.5 - 5.0 g/dL    Calcium 8.9 8.5 - 10.5 mg/dL    Phosphorus 3.2 2.5 - 4.5 mg/dL    Glucose 109 70 - 125 mg/dL    BUN 29 (H) 8 - 28 mg/dL    Creatinine 1.36 (H) 0.70 - 1.30 mg/dL    Sodium 137 136 - 145 mmol/L    Potassium 4.5 3.5 - 5.0 mmol/L    Chloride 105 98 - 107 mmol/L    CO2 24 22 - 31 mmol/L    Anion Gap, Calculation 8 5 - 18 mmol/L    GFR MDRD Af Amer >60 >60 mL/min/1.73m2    GFR MDRD Non Af Amer 51 (L) >60 mL/min/1.73m2   Comprehensive Metabolic Panel    Collection Time: 04/27/20 11:17 AM   Result Value Ref Range    Sodium 138 136 - 145 mmol/L    Potassium 4.6 3.5 - 5.0 mmol/L    Chloride 105 98 - 107 mmol/L    CO2 24 22 - 31 mmol/L    Anion Gap, Calculation 9 5 - 18 mmol/L    Glucose 112 70 - 125 mg/dL    BUN 30 (H) 8 - 28 mg/dL    Creatinine 1.44 (H) 0.70 - 1.30 mg/dL    GFR MDRD Af Amer 57 (L) >60 mL/min/1.73m2    GFR MDRD Non Af Amer 47 (L) >60 mL/min/1.73m2    Bilirubin, Total 0.3 0.0 - 1.0 mg/dL    Calcium 9.5 8.5 - 10.5 mg/dL    Protein, Total 6.8 6.0 - 8.0 g/dL    Albumin 3.4 (L) 3.5 - 5.0 g/dL    Alkaline Phosphatase 84 45 - 120 U/L    AST 11 0 - 40 U/L    ALT <9 0 - 45 U/L   TSH    Collection Time: 04/27/20 11:17 AM   Result Value Ref Range    TSH 2.41 0.30 - 5.00 uIU/mL   HM1 (CBC with Diff)    Collection Time: 04/27/20 11:17 AM   Result Value Ref Range    WBC 6.0 4.0 - 11.0 thou/uL    RBC 4.39 (L) 4.40 - 6.20 mill/uL    Hemoglobin 12.1 (L) 14.0 - 18.0 g/dL    Hematocrit 38.1 (L) 40.0 - 54.0 %    MCV 87 80 - 100 fL    MCH 27.6 27.0 - 34.0 pg    MCHC 31.8 (L) 32.0 - 36.0 g/dL    RDW 17.0 (H) 11.0 - 14.5 %    Platelets 238 140 - 440 thou/uL    MPV 9.9 8.5 - 12.5 fL    Neutrophils % 70 50 - 70 %    Lymphocytes % 17 (L) 20 - 40 %    Monocytes % 10 2 - 10 %    Eosinophils % 3 0 - 6 %    Basophils % 1 0 - 2 %    Neutrophils Absolute 4.2 2.0 - 7.7 thou/uL    Lymphocytes Absolute 1.0 0.8 - 4.4 thou/uL    Monocytes Absolute  0.6 0.0 - 0.9 thou/uL    Eosinophils Absolute 0.2 0.0 - 0.4 thou/uL    Basophils Absolute 0.0 0.0 - 0.2 thou/uL         Imaging    Nm Pet Ct Skull To Mid Thigh    Result Date: 4/24/2020  EXAM: NM PET CT SKULL TO MID THIGH LOCATION: [facility] DATE/TIME: 4/24/2020 12:41 PM INDICATION: Subsequent treatment strategy for restaging right upper lobe lung cancer COMPARISON: PET CT from 01/22/2020 TECHNIQUE: Serum glucose level 104 mg/dL. One hour post intravenous administration of 8.3 mCi F-18 FDG, PET imaging was performed from the skull base to the mid thighs utilizing attenuation correction with concurrent axial CT and PET/CT image fusion. Dose reduction techniques were used. FINDINGS: Right pleural metastases have increased in size, number, and FDG activity slightly. A representative metastasis in the right pleural space anteriorly has increased from 1.3 x 0.8 cm (SUVmax 6.7) to 1.8 x 1.3 cm (SUVmax 7.2). Right cardiophrenic  angle lymph node has increased from 0.9 x 0.7 cm (SUVmax 4.9) to 1.5 x 1.3 cm (SUVmax 5.9). Decreasing inflammatory FDG activity associated with scarring in the perihilar right lung. Marked centrilobular emphysema. Non-FDG avid left lower lobe pulmonary nodule. Left IJ Port-A-Cath tip in the low SVC. Marked calcified atherosclerosis, including coronary. Markedly enlarged prostate. Large left hydrocele. Mild degenerative change throughout the spine.     Mild disease progression        Signed by: Jada Lopez MD

## 2021-06-07 NOTE — PATIENT INSTRUCTIONS - HE
Recent Results (from the past 24 hour(s))   Comprehensive Metabolic Panel   Result Value Ref Range    Sodium 139 136 - 145 mmol/L    Potassium 4.4 3.5 - 5.0 mmol/L    Chloride 106 98 - 107 mmol/L    CO2 24 22 - 31 mmol/L    Anion Gap, Calculation 9 5 - 18 mmol/L    Glucose 102 70 - 125 mg/dL    BUN 38 (H) 8 - 28 mg/dL    Creatinine 1.49 (H) 0.70 - 1.30 mg/dL    GFR MDRD Af Amer 55 (L) >60 mL/min/1.73m2    GFR MDRD Non Af Amer 45 (L) >60 mL/min/1.73m2    Bilirubin, Total 0.3 0.0 - 1.0 mg/dL    Calcium 9.6 8.5 - 10.5 mg/dL    Protein, Total 7.0 6.0 - 8.0 g/dL    Albumin 3.5 3.5 - 5.0 g/dL    Alkaline Phosphatase 87 45 - 120 U/L    AST 12 0 - 40 U/L    ALT <9 0 - 45 U/L   HM1 (CBC with Diff)   Result Value Ref Range    WBC 7.9 4.0 - 11.0 thou/uL    RBC 4.50 4.40 - 6.20 mill/uL    Hemoglobin 12.3 (L) 14.0 - 18.0 g/dL    Hematocrit 39.3 (L) 40.0 - 54.0 %    MCV 87 80 - 100 fL    MCH 27.3 27.0 - 34.0 pg    MCHC 31.3 (L) 32.0 - 36.0 g/dL    RDW 17.1 (H) 11.0 - 14.5 %    Platelets 235 140 - 440 thou/uL    MPV 10.5 8.5 - 12.5 fL    Neutrophils % 73 (H) 50 - 70 %    Lymphocytes % 15 (L) 20 - 40 %    Monocytes % 8 2 - 10 %    Eosinophils % 3 0 - 6 %    Basophils % 1 0 - 2 %    Neutrophils Absolute 5.8 2.0 - 7.7 thou/uL    Lymphocytes Absolute 1.2 0.8 - 4.4 thou/uL    Monocytes Absolute 0.6 0.0 - 0.9 thou/uL    Eosinophils Absolute 0.2 0.0 - 0.4 thou/uL    Basophils Absolute 0.0 0.0 - 0.2 thou/uL

## 2021-06-07 NOTE — PROGRESS NOTES
Patient here today for labs, OV with REBEKA Mendenhall CNP, and treatment for lung CA/MARIELENA Zuniga RN

## 2021-06-08 NOTE — TELEPHONE ENCOUNTER
Please inform him that I did review his blood pressure reading, overall  They are mostly ,help him  schedule a virtual visit to discuss treatment strategy.

## 2021-06-08 NOTE — PATIENT INSTRUCTIONS - HE
I did send a prescription called HCTZ 12.5 mg to take 1 tablet  daily  in the morning.    Continue to monitor your blood pressure at home, follow-up with a phone call or visit in about 2 to 3 weeks.

## 2021-06-08 NOTE — PROGRESS NOTES
"Jared Moeller is a 79 y.o. male who is being evaluated via a billable video visit.      The patient has been notified of following:     \"This video visit will be conducted via a call between you and your physician/provider. We have found that certain health care needs can be provided without the need for an in-person physical exam.  This service lets us provide the care you need with a video conversation.  If a prescription is necessary we can send it directly to your pharmacy.  If lab work is needed we can place an order for that and you can then stop by our lab to have the test done at a later time.    Video visits are billed at different rates depending on your insurance coverage. Please reach out to your insurance provider with any questions.    If during the course of the call the physician/provider feels a video visit is not appropriate, you will not be charged for this service.\"    Patient has given verbal consent to a Video visit? Yes    Patient would like to receive their AVS by AVS Preference: Anup.    Patient would like the video invitation sent by: Send to e-mail at: [email or web address]    Will anyone else be joining your video visit? No        Video Start Time: 1:00 PM    Additional provider notes: History of cancer, currently getting chemotherapy once a month, scheduled for PET scan in about a month.  History of hypertension, has been on atenolol in the past but medication stopped working, cardiology switched to Micardis, blood pressure was low during chemotherapy and he stopped taking it.  Just recently blood pressure has been mildly elevated, he did email a copy of his blood pressure reading most of the number following above 140/90.  Denies any chest pain or shortness of breath.  Does have chronic back pain.Staying mostly at home to minimize exposure to COVID-19.    Physical exam: Well-developed well-nourished, no apparent distress, HEENT normocephalic atraumatic, pupils equal reactive light " accommodation, neck supple, thought process and language is adequate.  .  Jared was seen today for blood pressure check.    Diagnoses and all orders for this visit:    Essential hypertension  -     hydroCHLOROthiazide (HYDRODIURIL) 12.5 MG tablet; Take 1 tablet (12.5 mg total) by mouth daily.  -     Basic Metabolic Panel; Future      Hypertension is uncontrolled, continue healthy lifestyle changes, start low-dose HCTZ 12.5 mg daily, monitor blood pressure, call or schedule appointment in 2 to 3 weeks, sooner if there is any new concern.  Possible side effect medication discussed, will check BMP in about 2 to 3 weeks.  Consider resuming micardis if blood pressure still elevated.  Video-Visit Details    Type of service:  Video Visit    Video End Time (time video stopped): 1:14PM  Originating Location (pt. Location): Home    Distant Location (provider location):  [facility]IC[facility]/OB     Platform used for Video Visit: Krista Fregoso MD

## 2021-06-08 NOTE — PROGRESS NOTES
[facility]ology [facility] On[facility] Progress Note    Patient: Jared Moeller  MRN: [id number]  Date of Service: 05/26/2020        Reason for Visit:    D1C5 palliative nivolumab therapy.     Assessment and Plan:    Cancer Staging    1. Primary cancer of right upper lobe of lung (H)    Clinical Stage IIIB (cT4, cN2, cM0)     79 y.o.     Personal history of COPD.  Quit smoking in 2015 with 46 year p/h.      07/31/19 CT chest - obstructing central right suprahilar mass and complete atelectasis right upper lobe. 8mm rounded indeterminate nodule at left lung base. Severe emphysema.    08/09/19 NM PET - 4.3 x 4.7 x 5.6 cm mass in the central right upper lobe involving the right suprahilar region and right lower paratracheal mediastinum by direct extension. Associated complete right upper lobe atelectasis. Separate FDG avid right lower paratracheal station 4 and subcarinal station 7 (SUVmax 7.2), adenopathy suspicious for arik metastases. Few prominent right anterior cardiophrenic nodes with low-level uptake are indeterminate but suspicious for additional early arik metastases. Mild areas of subpleural thickening in the posterior right chest associated with low-level but appreciable uptake. No pleural effusion. No suspicious focal uptake in the liver skeleton. Normal adrenal glands. Severe emphysema. Moderate atherosclerotic calcifications including the coronary arteries. 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 Marked prostate gland enlargement. Large left scrotal hydrocele. Moderate scattered changes in the spine.    08/14/19 FNA through the endobronchial ultrasound - showed moderately differentiated squamous cell cancer of the lung.  The mediastinal lymph nodes were also positive.    08/28/19 MR brain - No findings to suggest intracranial metastases. No evidence of an acute intracranial abnormality. Findings compatible with mild chronic small vessel  ischemic change.    08/30/19 US kidney - Both kidneys appear echogenic suggesting medical renal disease. There is mild bilateral renal atrophy.  Enlarged prostate gland.    There was concern that he may have pulmonary tuberculosis because the auramine rhodamine stain for acid-fast bacilli was positive and the aspirate from the lower paratracheal lymph node.    Kinyoun stain for AFB came back negative.      Mantoux was negative.      QuantiFERON test was negative.      Sputum AFB x3 was negative by staining and by PCR.      Cultures were negative.      Consult with  of infectious diseases.    Chemoradiation with a small chance of cure was recommended.  Considering his kidney failure, carboplatin rather than cisplatin will be used.     09/09 - 10/14/19 completed weekly carboplatin (AUC 2) + paclitaxel (50 mg/m ) chemotherapy concurrent with 30 fx / 6000 cGy EBRT.      09/11/19 - IR port-a-cath placement.    11/11/19 - 01/20/20 completed 3 cycles maintenance Durvalumab therapy.    12/02/19 NM PET - Mixed response with significant improvement in right suprahilar lung mass and hilar and mediastinal lymphadenopathy but progression of what appear to be right pleural metastases.    12/18/19 CT (R) lung nodule biopsy - c/w adenocarcinoma.  No targetable mutations.    01/22/20 NM PET - Progression of right pleural metastases. Partial favorable treatment response central right upper lobe lung cancer status post radiation therapy. Increased postradiation changes in the right greater than left lungs. No new site of metastasis.    01/22/20 consult with Dr Lopez - with progression of disease outside of the radiation field, he now has metastatic disease involving the pleural surface and chest wall likely because we could not treat him with a sufficiently high dose of  chemotherapy because of his kidney failure.  Art was informed that we no longer have the possibility of cure, rather palliative treatment.  Second line  therapy with nivolumab was recommended.    02/03/20 - D1C1 Nivolumab therapy.    02/14/20 - surgical consult for small right inguinal hernia - did not recommend surgery.    04/24/20 NM PET - mild progression of disease, however at this point there is a possibility that this could be pseudo-progression due to immune activation.    05/07/20 (R) US thoracentesis - 1.6 liters of serosanguineous fluid were removed and sent to lab.    05/26/20:    CBC - WBC, ANC and Plts WNL.  HgB stable @ 11.8.    Found to be mildly iron deficient at his nephrology appointment - started on SR-iron, one daily - tolerating OK.    CMP - slightly worse CKD.  Slightly improved hypoalbuminemia.    Encouraged pushing oral fluids.    Increase from 1 to 2 Boost protein or CIB supplement/day.    TSH - pending.    Art presents alone anticipating D1C5 palliative Nivolumab therapy.  He is about 3 weeks S/P (R) thoracentesis with 1.6 liters removed.  He noted immediate improvement with his breathing and it has remained quite stable since.  Improved but persistent mild shortness of breath, mild fatigue and mild arthralgias and myalgias.  He has a history of PAD in his legs - used to walk a mile/day, now limited with the Covid-19 restrictions.  His appetite is only fair with early satiety, however his weight has remained quite stable over the past 6 months.  He still notes thoracic back pain, but it is not sharp and has improved since getting a new mattress - managed with 3-4 ES Tylenol/day.  He was recently started on HCTZ 12.5 mg/day through PCP for mild hypertension.    I again reviewed the possible side effects of nivolumab treatment, focusing on the variety of immune mediated side effects.  Some that can be very serious and even life-threatening.      Tolerating immunotherapy acceptably well.    Back pain - degenerative changes throughout the spine:     Managed with 3-4 ES Tylenol/day.  He can go up to 6 tablets/day.      Proceed with D1C5  Nivolumab therapy.    I instructed Art to monitor his mild, stable shortness of breath.  If it would worsen, he is to let us know.      06/22/20 - follow up D1C6 Nivolumab therapy with labs per Treatment Plan and NM PET.       If nivolumab is working, we will continue with it for as long as it is effective and tolerated.  If he achieves a complete radiological response, we may be able to stop nivolumab after 2 years of treatment.  If there is progression of disease, we will have to try conventional chemotherapy.     2.  Chronic kidney disease stage III.      Likely a primary kidney disorder r/t the nephritis he had as a child.     Follows with Nephrology, Dr. Rubin of [facility] [place].      05/07/20 - Urology f/u with Dr Rubin:    Quite stable CKD since at least 2016     Off ACE - likely the cause of drop in creatinine.     No NSAIDs.     Good hydration.     No indication for renal biopsy at this time.     Risk for AIN with Nivolumab.      F/U 3-months.     05/26/20 - slightly worse CKD.    Encouraged pushing oral fluids.    ECOG Performance:     ECOG Performance Status: 1    Distress Assessment:    Distress Assessment Score: No distress    Pain:    Pain Score (Initial OR Reassessment): 4  Managed with ES tylenol.         TT > 25 minutes face to face with > 50 % in counseling and coordination of care.    Merlin Mendenhall, CNP     CC: Maria De Jesus Fregoso MD Elizabeth Cameron, MD Andrew Hipp, DO  ____________________________________    Interim History:    Mr. Jared Moeller presents alone anticipating D1C5 palliative Nivolumab therapy.  He is in good spirits and looks and feels quite good.  He is about 3 weeks S/P (R) thoracentesis with 1.6 liters removed.  He noted immediate improvement with his breathing and it has remained stable since.  Improved but persistent mild shortness of breath, mild fatigue and mild arthralgias and myalgias.  He has a history of PAD in his legs - used to walk a  mile/day, now more limited with the Covid-19 restrictions.  He continues to note backaches managed with 3-4 ES Tylenol and Tylenol PM for sleep.  His appetite is only fair with early satiety, however his weight has remained quite stable over the past 6 months.  He denies concerning pain, fever or chills, visual or mentation disturbance, difficulty with bowels or urination, skin rash.    Pain Status:    Currently in Pain: Yes     Review of Systems:    Constitutional  Constitutional (WDL): Exceptions to WDL  Fatigue: Concerns(daily, still about to do daily activities)  Neurosensory  Neurosensory (WDL): All neurosensory elements are within defined limits  Eye   Eye Disorder (WDL): All eye disorder elements are within defined limits  Ear  Ear Disorder (WDL): All ear disorder elements are within defined limits  Cardiovascular  Cardiovascular (WDL): All cardiovascular elements are within defined limits  Pulmonary  Respiratory (WDL): Within Defined Limits  Gastrointestinal  Gastrointestinal (WDL): All gastrointestinal elements are within defined limits  Genitourinary  Genitourinary (WDL): All genitourinary elements are within defined limits  Lymphatic  Lymph (WDL): All lymph disorder elements are within defined limits  Musculoskeletal and Connective Tissue  Musculoskeletal and Connetive Tissue Disorders (WDL): Exceptions to WDL  Arthralgia: Concerns(right shoulder pain)  Myalgia: Concerns(thoracic back pain; tylenol prn x1 year)  Integumentary  Integumentary (WDL): All integumentary elements are within defined limits  Patient Coping  Patient Coping: Open/discussion;Accepting  Accompanied by  Accompanied by: Alone  Oral Chemo Adherence       Past History:    Past Medical History:   Diagnosis Date     BPH (benign prostatic hyperplasia)      Carotid stenosis, bilateral 04/07/2016    left 50-69%. moderate right side.     Chronic kidney disease (CKD), stage III (moderate) (H)      Colon polyps      COPD (chronic obstructive  "pulmonary disease) (H)      Epididymal cyst, left side. 04/07/2016     Hematuria 12/2002    Work-up was negative.     Hypertension      Inguinal hernia     Right     Nephritis     11 years old.     Peripheral vascular disease (H)      Polio     6 years of age. Right leg. Recovered fully.     Shingles 1996    left forehead.         Past Surgical History:   Procedure Laterality Date     COLONOSCOPY       CT BIOPSY LUNG  12/18/2019     CYSTOSCOPY  2002     FLEXIBLE BRONCHOSCOPY  08/14/2019    with EBUS FNA>     IR EXTREMITY ANGIOGRAM BILATERAL  04/06/2016     IR PORT PLACEMENT >5 YEARS  9/11/2019     NASAL SEPTUM SURGERY  1980s     TONSILLECTOMY  1948      THORACENTESIS  5/7/2020       Physical Exam:    Recent Vitals 5/26/2020   Height 5' 4\"   Weight 118 lbs 6 oz   BSA (m2) 1.56 m2   /83   Pulse 98   Temp 97.5   Temp src 1   SpO2 98   Some recent data might be hidden       GENERAL:   Very pleasant.  Alert and oriented.  Comfortable.  In no acute distress.     HEENT:   Atraumatic and normocephalic.  DANYA.  EOMI.  No pallor.  No icterus. No mucosal lesions.    LYMPH NODES:  No palpable cervical, axillary or inguinal lymphadenopathy.    CHEST:   Diminished breath sounds bilaterally but quite clear.    Port-a-cath in place.  No s/s infection.    CVS:    S1 and S2 aheard.  Regular rate and rhythm. No murmur, gallop or rub heard.  No peripheral edema.    ABDOMEN:   Soft.  Not tender or distended.  No palpable hepatomegaly or splenomegaly, masses or ascites.     EXTREMITIES:  Warm.    SKIN:    No rash, bruising or purpura noted.    Lab Results:    Reviewed with patient.    Recent Results (from the past 24 hour(s))   Comprehensive Metabolic Panel   Result Value Ref Range    Sodium 139 136 - 145 mmol/L    Potassium 4.2 3.5 - 5.0 mmol/L    Chloride 107 98 - 107 mmol/L    CO2 24 22 - 31 mmol/L    Anion Gap, Calculation 8 5 - 18 mmol/L    Glucose 77 70 - 125 mg/dL    BUN 37 (H) 8 - 28 mg/dL    Creatinine 1.57 (H) 0.70 - " 1.30 mg/dL    GFR MDRD Af Amer 52 (L) >60 mL/min/1.73m2    GFR MDRD Non Af Amer 43 (L) >60 mL/min/1.73m2    Bilirubin, Total 0.2 0.0 - 1.0 mg/dL    Calcium 9.5 8.5 - 10.5 mg/dL    Protein, Total 6.9 6.0 - 8.0 g/dL    Albumin 3.4 (L) 3.5 - 5.0 g/dL    Alkaline Phosphatase 86 45 - 120 U/L    AST 15 0 - 40 U/L    ALT 14 0 - 45 U/L   HM1 (CBC with Diff)   Result Value Ref Range    WBC 6.8 4.0 - 11.0 thou/uL    RBC 4.27 (L) 4.40 - 6.20 mill/uL    Hemoglobin 11.8 (L) 14.0 - 18.0 g/dL    Hematocrit 37.1 (L) 40.0 - 54.0 %    MCV 87 80 - 100 fL    MCH 27.6 27.0 - 34.0 pg    MCHC 31.8 (L) 32.0 - 36.0 g/dL    RDW 16.6 (H) 11.0 - 14.5 %    Platelets 262 140 - 440 thou/uL    MPV 10.5 8.5 - 12.5 fL    Neutrophils % 68 50 - 70 %    Lymphocytes % 19 (L) 20 - 40 %    Monocytes % 9 2 - 10 %    Eosinophils % 3 0 - 6 %    Basophils % 1 0 - 2 %    Neutrophils Absolute 4.6 2.0 - 7.7 thou/uL    Lymphocytes Absolute 1.3 0.8 - 4.4 thou/uL    Monocytes Absolute 0.6 0.0 - 0.9 thou/uL    Eosinophils Absolute 0.2 0.0 - 0.4 thou/uL    Basophils Absolute 0.1 0.0 - 0.2 thou/uL       Imaging:    No new imaging.

## 2021-06-09 NOTE — PROGRESS NOTES
"Jared Moeller is a 79 y.o. male who is being evaluated via a billable video visit.      The patient has been notified of following:     \"This video visit will be conducted via a call between you and your physician/provider. We have found that certain health care needs can be provided without the need for an in-person physical exam.  This service lets us provide the care you need with a video conversation.  If a prescription is necessary we can send it directly to your pharmacy.  If lab work is needed we can place an order for that and you can then stop by our lab to have the test done at a later time.    Video visits are billed at different rates depending on your insurance coverage. Please reach out to your insurance provider with any questions.    If during the course of the call the physician/provider feels a video visit is not appropriate, you will not be charged for this service.\"    Patient has given verbal consent to a Video visit? Yes  How would you like to obtain your AVS? AVS Preference: [employer].  Patient would like the video invitation sent by: Text to cell phone: [phone]  Will anyone else be joining your video visit? No        Video Start Time: 02:40pm    Additional provider notes: see other note      Video-Visit Details    Type of service:  Video Visit    Video End Time (time video stopped): 03:05pm  Originating Location (pt. Location): Home    Distant Location (provider location):  [facility]/OB     Platform used for Video Visit: Krista Fregoso MD  "

## 2021-06-09 NOTE — TELEPHONE ENCOUNTER
Pt sent [employer] message needing some clarification on his apt's. He was unsure why there was an infusion apt scheduled for 7/24.   Explained to pt that on 7/24 he will be coming in for labs, OV, and poss fluids and that's why there is an infusion apt. Explained that this a mid-cycle check. Pt verbalized understanding of this and will RTC as sched.

## 2021-06-09 NOTE — PROGRESS NOTES
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    Assessment and Plan  Cancer Staging  Primary cancer of right upper lobe of lung (H)  Staging form: Lung, AJCC 8th Edition  - Clinical stage from 8/20/2019: Stage IIIB (cT4, cN2, cM0) - Signed by Jada Lopez MD on 9/4/2019      ECOG Performance   ECOG Performance Status: 1    Distress Assessment  Distress Assessment Score: 3: Please see the discussion below.  Pain  Pain Score (Initial OR Reassessment): 2: I am increasing the pain medications by adding on a Norco.  Please see the discussion below.    Mr. Jared Moeller is a 79 y.o. gentleman with COPD, history of smoking which he quit in 2015.  He had some old lung nodules but was found in July 2019 to have a right upper lobe lung mass, in the right suprahilar area contiguous with the mediastinum.  The mass in the PET CT scan was 5.6 x 4.7 x 4.3 cm in size and PET positive.  This involved the right suprahilar region and a right lower paratracheal mediastinum with direct extension.  There was complete collapse of the right upper lobe with involvement of the mediastinum, possibly the right pericardial region and possibly the right pleura in the right lower lobe region.  The FNA done through the endobronchial ultrasound on 8/14/2019 showed moderately differentiated squamous cell cancer of the lung.  The mediastinal lymph nodes were also positive.    He has a chronic kidney disease stage III.  I think he has some sort of primary kidney disorder.  Likely related to the nephritis that he had as a child.  He has been referred to nephrology and was seen by Dr. Rubin of [facility] [place].  Work-up is ongoing.   Our plan is for radiation along with chemotherapy with weekly carboplatin AUC of 2 and paclitaxel at 50 mg/m .  After he is done with  chemotherapy and radiation we will go with Durvalumab IV given every 2 weeks.  He started chemotherapy and radiation on 9/9/2019.  He received the last day of chemotherapy on 10/14/2019 and received the last day of radiation on 10/18/2019.  He started postradiation durvalumab on 11/11/2019.    He had a follow-up PET CT scan after the chemoradiation done on 12/2/2019.  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  When we look at the previous PET CT scan from 8/9/2019 there was some faint uptake in those areas.  Most likely that her disease was there at that time and it has grown.    He had a CT-guided biopsy from the most prominent lesion on the right-sided pleura done on 12/18/2019.  Pathology was positive for malignant cells.  The pathologist felt that the tumor morphology and immunohistochemical profile is most consistent with an adenocarcinoma.  This raises the possibility that he had a second cancer different from the original squamous cell cancer that he had at diagnosis in the central area of the chest.    We obtained additional pathology testing done.  In the next generation sequencing done at the [facility] molecular lab, we did not find any targetable mutations.  We then requested PDL 1 testing and that has been reported as not enough cells to give us a reliable answer.    The PET CT scan that was done on 1/22/2020, he had progression of disease.  The pleural mets on the right side chest wall had increased in size and PET activity.  The pleural effusion was also increasing in size.  After discussion with pathology, it appeared that the second biopsy from the pleural metastasis that was done on 12/18/2019 showed metastatic disease review of the lung  cancer and not a second cancer.  Thus he clearly had progression of disease after chemotherapy and radiation.  We decided to get him started on second line of treatment with nivolumab because of his other comorbidities, especially CKD.  He started Nivolumab on 2/3/2020.    The PET CT scan that was done on 4/24/2020, after he had completed 3 cycles of nivolumab, there is some progression of the right pleural mets with increase in size by a few millimeters and some PET activity.  The right cardiophrenic lymph node was also increased slightly.  I think the pleural effusion was also a bit more on the right side.  We decided to continue with nivolumab and see whether it is effective down the line.  Another thoracentesis done on 5/7/2020 where 1.6 L of serosanguineous fluid was removed and sent to the lab.  He received cycle 6 of nivolumab on 6/22/2020.    1.  He is here for follow-up and overall he feels that he is holding up okay.  He still has a large right-sided pleural effusion which I think is feeling up about half the right chest.  I think the pain that he has on the right side back is from the pleural mets.  On the other hand he feels that his breathing is slightly improved compared to when he saw me about 2 weeks ago.    2.  He has had a new PET CT scan done 6/30/2020.  Today I reviewed the images and the report with him and his wife.  There is some increase in the right-sided malignant pleural effusion.  There is increased size and PET uptake of the metastatic deposits all around the chest wall around the pleural effusion and also in the mediastinal lymph nodes.  We are not seeing any spread of cancer to the left side or elsewhere in the body.    3.  I discussed with him that clearly we are seeing progression of disease after 6 cycles of nivolumab.  At this point, I do not think there is any question of pseudo-progression.  This is clear progression of disease.  I do not think that Nivolumab is working.  We have  to stop treating him with nivolumab.  He voiced understanding.    4.  With the reaccumulation of the pleural effusion, I recommended having a Pleurx catheter placed.  Thus he can have the malignant effusion drained as needed even at home.  I think long-term this will make things easier for him.  He was agreeable.  I will request this through interventional radiology.  I would rather have it done before he starts the next chemotherapy regimen.    5.  I recommended conventional chemotherapy for ongoing treatment.  I discussed with him that he does have the choice of not doing any chemotherapy and going with supportive care alone but I would not recommend that.  I recommended adjuvant chemotherapy with Taxotere.  One of the considerations is that Taxotere can be given to him in spite of the chronic kidney disease.  He is already familiar with Taxol which he received along with radiation.  I explained to him the Taxotere he is related to Taxol and side effects are similar.  However he is likely feel the side effects more with Taxotere since we are giving him a larger dose given every 21 days.  I have given him printed information about Taxotere to read.  We had a thorough discussion about the possible side effects of Taxotere.  I discussed with him about the possibility of adding on a Ramucirumab to Taxotere.  I explained to him how the medication works.  I discussed with him the possible side effects of Ramucirumab.  I discussed with him that in the large trials, adding on a Ramucirumab to Taxotere improved life expectancy by about 4 or 5 weeks.  After discussion he decided against adding on Ramucirumab because he did not feel that the additional weeks of life expectancy was worth it compared to the possible side effects.    After thorough discussion about the side effects of Taxotere, he has signed a consent form for chemotherapy.  He understands that Taxotere chemotherapy will not be curative but the aim of  chemotherapy is palliative.    I educated him on taking dexamethasone starting the day before Taxotere chemotherapy.  I have also sent a prescription for Compazine as an antinausea medication to the pharmacy.    Discussed with him that we will plan on repeating a CT scan after each 2 cycles of chemotherapy.  I also discussed with him that his insurance company is unlikely to pay for more PET CT scans for him.    6.  He states that he is interested in clinical trials and also in a second opinion.  We discussed about [facility] versus [facility].  He preferred at the [facility].  I have placed a second opinion referral.  I discussed with him that if he goes on a clinical trial, then he will need to have the treatment also done at that center.  He voiced understanding.    7.  For the pain that he has over his right side chest, he is satisfied with Norco for pain control.  I have sent a new prescription to the pharmacy.  I asked him to give me a call when he needs a new prescription.  IL induced constipation does not appear to be a problem at this point.    8.  Follow-up: I have requested for him to be scheduled for a Pleurx catheter placement in the coming few days.  Plan to state started with Taxotere chemotherapy in about 2 weeks time.  Labs according to treatment plan and appointment with MD or NP on the first day.    Time spend >40 minutes face to face with the patient. More than 50 % in counseling and coordination of care.      Problem List    1. Primary cancer of right upper lobe of lung (H)  HYDROcodone-acetaminophen 5-325 mg per tablet    prochlorperazine (COMPAZINE) 10 MG tablet    dexAMETHasone (DECADRON) 4 MG tablet    CC OFFICE VISIT LONG    Infusion Appointment    CC OFFICE VISIT LONG    IR Tunneled Pleural Catheter Insertion    Ambulatory referral to Oncology/Hematology Adult   2. Chronic kidney disease (CKD), stage III (moderate) (H)     3. Cancer associated pain   HYDROcodone-acetaminophen 5-325 mg per tablet   4. Malignant pleural effusion  IR Tunneled Pleural Catheter Insertion        CC: Maria De Jesus Fregoso MD Andrew Hipp, DO    ______________________________________________________________________________    History of Present Illness    Mr. Jared Moeller he is here for follow-up alone.  His wife also took part in the encounter by being on the video phone.    He states that he still has some pain in the back of the chest towards the right side.  He feels that the pain is under pretty good control with the Norco tablets.  He usually takes a couple of tablets per day.  He is sleeping better.  He states that he still has dyspnea on exertion but he feels that his breathing is slightly improved compared to when he saw me last.  Cough is rare.  Occasionally he brings up some white-colored phlegm.  He has not noticed any blood in the sputum.    He is anxious to see the results of the PET CT scan.    Otherwise he has no new symptoms to report.    No fevers or night sweats.  No lumps or bumps anywhere.  Weight has been stable.  No unusual headaches.  No vision changes.  No neurological symptoms.  He states that he is eating okay.  No nausea or vomiting.  No abdominal pain.  No complaints of constipation or diarrhea.  No blood in stool or black stools.  No difficulty with urination.  No skin rashes.  No numbness or tingling.    Please see below.  A 14 point review of system is otherwise completely negative.      Pain Status  Currently in Pain: No/denies    Review of Systems    Constitutional  Constitutional (WDL): Exceptions to WDL  Fatigue: Fatigue relieved by rest  Fever: None  Chills: None  Weight Gain: None  Weight Loss: None  Neurosensory  Neurosensory (WDL): Exceptions to WDL  Peripheral Motor Neuropathy: None  Ataxia: None  Peripheral Sensory Neuropathy: None  Confusion: None  Eye   Eye Disorder (WDL): All eye disorder elements are within defined limits  Ear  Ear  Disorder (WDL): All ear disorder elements are within defined limits  Cardiovascular  Cardiovascular (WDL): Exceptions to WDL  Palpitations: None  Edema: No  Pulmonary  Respiratory (WDL): Exceptions to WDL  Cough: None  Dyspnea: Shortness of breath with minimal exertion, limiting instrumental ADL  Hypoxia: None  Gastrointestinal  Gastrointestinal (WDL): Exceptions to WDL  Anorexia: None  Constipation: Occasional or intermittent symptoms, occasional use of stool softeners, laxatives, dietary modification, or enema(one episode)  Diarrhea: None  Dysphagia: None  Esophagitis: None  Nausea: None  Pharyngitis: None  Vomiting: None  Dysgeusia: None  Dry Mouth: None  Genitourinary  Genitourinary (WDL): All genitourinary elements are within defined limits  Lymphatic  Lymph (WDL): All lymph disorder elements are within defined limits  Musculoskeletal and Connective Tissue  Musculoskeletal and Connetive Tissue Disorders (WDL): Exceptions to WDL  Arthralgia: None  Bone Pain: Moderate pain, limiting instrumental ADL(thoracic)  Muscle Weakness : Symptomatic, evident on physical exam, limiting instrumental ADL  Myalgia: Moderate pain, limiting instrumental ADL(Rt > Lt chest wall, burning, oksana after treatment)  Integumentary  Integumentary (WDL): Exceptions to WDL(flakey forehead)  Alopecia: None  Rash Maculo-Papular: None  Pruritus: None  Urticaria: None  Palmar-Plantar Erythrodysesthesia Syndrome: None  Flushing: None  Patient Coping     Distress Assessment  Distress Assessment Score: 3  Accompanied by     Oral Chemo Adherence         Past History  Past Medical History:   Diagnosis Date     BPH (benign prostatic hyperplasia)      Carotid stenosis, bilateral 04/07/2016    left 50-69%. moderate right side.     Chronic kidney disease (CKD), stage III (moderate) (H)      Colon polyps      COPD (chronic obstructive pulmonary disease) (H)      Epididymal cyst, left side. 04/07/2016     Hematuria 12/2002    Work-up was negative.      Hypertension      Inguinal hernia     Right     Nephritis     11 years old.     Peripheral vascular disease (H)      Polio     6 years of age. Right leg. Recovered fully.     Shingles 1996    left forehead.         Past Surgical History:   Procedure Laterality Date     COLONOSCOPY       CT BIOPSY LUNG  12/18/2019     CYSTOSCOPY  2002     FLEXIBLE BRONCHOSCOPY  08/14/2019    with EBUS FNA>     IR EXTREMITY ANGIOGRAM BILATERAL  04/06/2016     IR PORT PLACEMENT >5 YEARS  9/11/2019     NASAL SEPTUM SURGERY  1980s     TONSILLECTOMY  1948     US THORACENTESIS  5/7/2020       Physical Exam    Recent Vitals 7/1/2020   Height -   Weight 119 lbs 2 oz   BSA (m2) 1.56 m2   /80   Pulse 124   Temp 97.4   Temp src 1   SpO2 94   Some recent data might be hidden       GENERAL: Alert and oriented to time place and person. Seated comfortably. In no distress.  Thin build.  Slightly anxious.    HEAD: Atraumatic and normocephalic.    EYES: DANYA, EOMI.  No pallor.  No icterus.    Oral cavity: no mucosal lesion or tonsillar enlargement.    NECK: supple. JVP normal.  No thyroid enlargement.    LYMPH NODES: No palpable, cervical, axillary or inguinal lymphadenopathy.    CHEST: Left chest is clear to auscultation and has good air entry throughout.  The right chest has some air entry in the upper half.  Mostly silent in the right lower half.  Resonant to percussion throughout on the left side.  Dull to percussion in the intrascapular area on the right side.  Breath movements are much better on the left side compared to the right side.  Chest volume is also better on the left side compared to the right side.  Port-A-Cath over the left upper chest looks unremarkable.    CVS: S1 and S2 are heard. Regular rate and rhythm.  No murmur or gallop or rub heard.  No peripheral edema.    ABDOMEN: Soft. Not tender. Not distended.  No palpable hepatomegaly or splenomegaly.  No other mass palpable.  Bowel sounds heard.    EXTREMITIES: Warm.    SKIN:  no rash, or bruising or purpura.  Has a full head of hair.          Lab Results    No results found for this or any previous visit (from the past 24 hour(s)).      Imaging    Nm Pet Ct Skull To Mid Thigh    Result Date: 6/30/2020  EXAM: NM PET CT SKULL TO MID THIGH LOCATION: [facility] DATE/TIME: 6/30/2020 2:40 PM INDICATION: Subsequent treatment strategy and restaging for malignant neoplasm of upper lobe, right bronchus or lung. Status post radiation therapy to the right upper lobe completed in October 2019 followed by systemic immunotherapy. Monitor treatment response. COMPARISON: FDG PET/CT dated 04/24/2020 TECHNIQUE: Serum glucose level 84 mg/dL. One hour post intravenous administration of 9.4 mCi F-18 FDG, PET imaging was performed from the skull base to the mid thighs utilizing attenuation correction with concurrent axial CT and PET/CT image fusion. Dose  reduction techniques were used. FINDINGS: Continued interval increase in the metabolic activity and number of FDG avid right pleural metastasis (max SUV 7.1, previously 5.6) with increasing metabolic activity of the right cardiophrenic lymph node (max SUV 9.5, previously 5.9) and the development 7 mm right supraclavicular lymph node (max SUV 3.2) suggesting progression of disease. Persistent presumed inflammatory change in the right hilar region (Max SUV 2.9, previously 2.9)  mild senescent intracranial changes. Left chest port with tip terminating near the superior cavoatrial junction. Moderate coronary artery calcium. Severe upper lung predominant emphysema. Large right pleural effusion. Sigmoid diverticulosis. Pelvic phleboliths. Mild prostamegaly with in situ calcifications. Left hydrocele. Multilevel degenerative changes of the spine.     Findings suspicious for progression of disease with worsening right pleural metastases, increasing metabolic activity of a right cardiophrenic lymph node, and development of a new right supraclavicular lymph  node.        Signed by: Jada Lopez MD

## 2021-06-09 NOTE — PATIENT INSTRUCTIONS - HE
Recent Results (from the past 24 hour(s))   Protein / creatinine ratio, urine    Collection Time: 07/24/20 12:38 PM   Result Value Ref Range     Protein, Random Urine 8 mg/dL    Creatinine, Urine 35.7 mg/dL    Protein/Creatinine Ratio, Random Urine 0.22    Comprehensive Metabolic Panel    Collection Time: 07/24/20 12:38 PM   Result Value Ref Range    Sodium 134 (L) 136 - 145 mmol/L    Potassium 4.2 3.5 - 5.0 mmol/L    Chloride 99 98 - 107 mmol/L    CO2 25 22 - 31 mmol/L    Anion Gap, Calculation 10 5 - 18 mmol/L    Glucose 104 70 - 125 mg/dL    BUN 30 (H) 8 - 28 mg/dL    Creatinine 1.36 (H) 0.70 - 1.30 mg/dL    GFR MDRD Af Amer >60 >60 mL/min/1.73m2    GFR MDRD Non Af Amer 51 (L) >60 mL/min/1.73m2    Bilirubin, Total 0.4 0.0 - 1.0 mg/dL    Calcium 9.2 8.5 - 10.5 mg/dL    Protein, Total 6.7 6.0 - 8.0 g/dL    Albumin 3.2 (L) 3.5 - 5.0 g/dL    Alkaline Phosphatase 78 45 - 120 U/L    AST 14 0 - 40 U/L    ALT 25 0 - 45 U/L   HM1 (CBC with Diff)    Collection Time: 07/24/20 12:38 PM   Result Value Ref Range    WBC 1.2 (LL) 4.0 - 11.0 thou/uL    RBC 3.98 (L) 4.40 - 6.20 mill/uL    Hemoglobin 11.1 (L) 14.0 - 18.0 g/dL    Hematocrit 34.9 (L) 40.0 - 54.0 %    MCV 88 80 - 100 fL    MCH 27.9 27.0 - 34.0 pg    MCHC 31.8 (L) 32.0 - 36.0 g/dL    RDW 16.4 (H) 11.0 - 14.5 %    Platelets 252 140 - 440 thou/uL    MPV 10.6 8.5 - 12.5 fL   Manual Differential    Collection Time: 07/24/20 12:38 PM   Result Value Ref Range    Total Neutrophils % 6 (L) 50 - 70 %    Lymphocytes % 56 (H) 20 - 40 %    Monocytes % 30 (H) 2 - 10 %    Eosinophils %  8 (H) 0 - 6 %    Basophils % 0 0 - 2 %    Total Neutrophils Absolute 0.1 (L) 2.0 - 7.7 thou/ul    Lymphocytes Absolute 0.7 (L) 0.8 - 4.4 thou/uL    Monocytes Absolute 0.4 0.0 - 0.9 thou/uL    Eosinophils Absolute 0.1 0.0 - 0.4 thou/uL    Basophils Absolute 0.0 0.0 - 0.2 thou/uL    Platelet Estimate Normal Normal

## 2021-06-09 NOTE — PROGRESS NOTES
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    Assessment and Plan  Cancer Staging  Primary cancer of right upper lobe of lung (H)  Staging form: Lung, AJCC 8th Edition  - Clinical stage from 8/20/2019: Stage IIIB (cT4, cN2, cM0) - Signed by Jada Lopez MD on 9/4/2019      ECOG Performance   ECOG Performance Status: 1    Distress Assessment  Distress Assessment Score: 1: Please see the discussion below.  Pain  Pain Score (Initial OR Reassessment): 4(to 8 at times): I am increasing the pain medications by adding on a Norco.  Please see the discussion below.    Mr. Jared Moeller is a 79 y.o. gentleman with COPD, history of smoking which he quit in 2015.  He had some old lung nodules but was found in July 2019 to have a right upper lobe lung mass, in the right suprahilar area contiguous with the mediastinum.  The mass in the PET CT scan was 5.6 x 4.7 x 4.3 cm in size and PET positive.  This involved the right suprahilar region and a right lower paratracheal mediastinum with direct extension.  There was complete collapse of the right upper lobe with involvement of the mediastinum, possibly the right pericardial region and possibly the right pleura in the right lower lobe region.  The FNA done through the endobronchial ultrasound on 8/14/2019 showed moderately differentiated squamous cell cancer of the lung.  The mediastinal lymph nodes were also positive.    He has a chronic kidney disease stage III.  I think he has some sort of primary kidney disorder.  Likely related to the nephritis that he had as a child.  He has been referred to nephrology and was seen by Dr. Rubin of [facility] [place].  Work-up is ongoing.   Our plan is for radiation along with chemotherapy with weekly carboplatin AUC of 2 and paclitaxel at 50 mg/m .  After he  is done with chemotherapy and radiation we will go with Durvalumab IV given every 2 weeks.  He started chemotherapy and radiation on 9/9/2019.  He received the last day of chemotherapy on 10/14/2019 and received the last day of radiation on 10/18/2019.  He started postradiation durvalumab on 11/11/2019.    He had a follow-up PET CT scan after the chemoradiation done on 12/2/2019.  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  When we look at the previous PET CT scan from 8/9/2019 there was some faint uptake in those areas.  Most likely that her disease was there at that time and it has grown.    He had a CT-guided biopsy from the most prominent lesion on the right-sided pleura done on 12/18/2019.  Pathology was positive for malignant cells.  The pathologist felt that the tumor morphology and immunohistochemical profile is most consistent with an adenocarcinoma.  This raises the possibility that he had a second cancer different from the original squamous cell cancer that he had at diagnosis in the central area of the chest.    We obtained additional pathology testing done.  In the next generation sequencing done at the [facility] molecular lab, we did not find any targetable mutations.  We then requested PDL 1 testing and that has been reported as not enough cells to give us a reliable answer.    The PET CT scan that was done on 1/22/2020, he had progression of disease.  The pleural mets on the right side chest wall had increased in size and PET activity.  The pleural effusion was also increasing in size.  After discussion with pathology, it appeared that the second biopsy from the pleural metastasis that was done on 12/18/2019 showed metastatic disease review  of the lung cancer and not a second cancer.  Thus he clearly had progression of disease after chemotherapy and radiation.  We decided to get him started on second line of treatment with nivolumab because of his other comorbidities, especially CKD.  He started Nivolumab on 2/3/2020.    The PET CT scan that was done on 4/24/2020, after he had completed 3 cycles of nivolumab, there is some progression of the right pleural mets with increase in size by a few millimeters and some PET activity.  The right cardiophrenic lymph node was also increased slightly.  I think the pleural effusion was also a bit more on the right side.  We decided to continue with nivolumab and see whether it is effective down the line.  Another thoracentesis done on 5/7/2020 where 1.6 L of serosanguineous fluid was removed and sent to the lab.    1.  He has now completed 5 cycles of nivolumab.  Overall I think he has tolerated Nivolumab well.  Our plan was to get another scan done before cycle #6 of nivolumab.  However because of some confusion with insurance issues etc. this scan has not been done.  We decided to proceed with cycle #6 of nivolumab today.  Labs were reviewed.  Blood counts are fairly stable.  Hemoglobin is stable at 11.7.  WBC count and platelet count are normal.  Metabolic panel is also overall stable.  Even though he has CKD stage III, creatinine is stable at 1.45.  TSH is normal at 3.04.  We will go ahead with cycle #6 of nivolumab today.    2.  On clinical examination it appears that the pleural effusion on the right side, which we know is a malignant pleural effusion is slightly bigger.  I suspect that is what is making him have more pain.  There may also be progression of the pleural mass.  I think it is a pain that is making it difficult for him to sleep.  We discussed about what we can do to alleviate his symptoms.  I am giving him a prescription for 30 tablets of Norco.  He can use it as needed every 6 hours.  I told him  that he can still use Tylenol.  But he should take into account acetaminophen in the Norco also.  Thus taken together he should not take more than 6 tablets/day.  He voiced understanding.    I think once the pain is under control he will be able to sleep better.  I told him it is okay for him to use melatonin if needed.  I do not think that will be helpful.  He voiced understanding.    I discussed with him over the side effects of Norco, especially about constipation.  That is not a problem for him now at all.  I asked him to stay ahead of constipation by using laxatives as needed.  He voiced understanding.    3.  The chronic kidney disease remains stable.  Thankfully there has not been any worsening.  However there is going to be a problem for us since we cannot use IV contrast when we go for imaging to assess the cancer.  That means that we will have to use a PET CT scan anyway to try to find out whether he is responding to nivolumab or not.    4.  I discussed with him that at this point it becomes crucial to know more in detail what is happening inside him to decide on the further step of treatment.  If the pleural effusion is reaccumulating, we can consider another thoracentesis.  If there is clear evidence that the cancer is progressing in spite of Nivolumab, then we have to change to a different treatment regimen.  Most likely there will be conventional chemotherapy.  On the other hand if he is responding slowly to Nivolumab, we should continue with nivolumab.  Thus it is crucial juncture, especially considering that he cannot receive IV contrast, I think it is important for him to have the PET CT scan done.  I am ordering that now.    5.  I would like to see him back in clinic soon after the PET CT scan is done, hopefully in a week's time.  We will see what the PET CT scan shows and then decide on the next step of treatment.    Time spend >30 minutes face to face with the patient. More than 50 % in counseling  and coordination of care.        Problem List    1. Primary cancer of right upper lobe of lung (H)  CC OFFICE VISIT LONG    NM PET CT Skull to Mid Thigh    HYDROcodone-acetaminophen 5-325 mg per tablet   2. Encounter for antineoplastic immunotherapy  CC OFFICE VISIT LONG    NM PET CT Skull to Mid Thigh   3. Chronic kidney disease (CKD), stage III (moderate) (H)     4. Malignant pleural effusion  NM PET CT Skull to Mid Thigh   5. Cancer associated pain  HYDROcodone-acetaminophen 5-325 mg per tablet        CC: Maria De Jesus Fregoso, MD Mulugeta Rubin, DO    ______________________________________________________________________________    History of Present Illness    Mr. Jared Moeller is here for follow-up alone.    He states that overall he feels that he is not doing as well as when I saw him in the past.  Unfortunately it appears that he has not had the CT scan done.  Apparently the PET scan had some insurance problems and it was changed to a CT scan.  There was some confusion and the CT scan has not been done yet.    He says that he has more pain in his back.  He did have some chronic midline back pain in the thoracic area in the past but that is now more towards the right side.  He rates the pain as 4/10.  Because of the pain he states that he is not sleeping well at night.  He states that he has been taking Tylenol 4 to 5 tablets a day.  Initially that was enough to keep the pain under control but it is not enough now.  He would like to have something stronger.  He is also wondering what he can take something like melatonin for sleep.    He states that his breathing has not changed much.  He can still walk a couple of miles.  Has a little cough.  Brings up some scanty white phlegm.  No blood streaking.    He states that his wife has been feeding him well.  He has not lost weight.  On the other hand he surprised that he is not gaining any weight.    The mild rash that he had on his face has subsided well  after he started applying some proprietary cream to that area.    No fevers or night sweats.  No lumps or bumps anywhere.  No unusual headaches.  No eyesight problems.  No mouth sores.  No swallowing difficulty.  No nausea or vomiting.  No anginal chest pain.  No palpitation.  No abdominal pain.  No constipation or diarrhea.  No blood in stool or black stools.  No difficulty with urination.  No complaints of numbness or tingling.    Please see below.  A 14 point review of system is otherwise completely negative.    Pain Status  Currently in Pain: Yes    Review of Systems    Constitutional  Constitutional (WDL): Exceptions to WDL  Fatigue: Fatigue relieved by rest  Fever: None  Chills: None  Weight Gain: 5 - <10% from baseline(2#)  Weight Loss: None  Neurosensory  Neurosensory (WDL): Exceptions to WDL  Peripheral Motor Neuropathy: None  Ataxia: None  Peripheral Sensory Neuropathy: None  Confusion: None  Syncope: None  Eye   Eye Disorder (WDL): All eye disorder elements are within defined limits  Ear  Ear Disorder (WDL): All ear disorder elements are within defined limits  Cardiovascular  Cardiovascular (WDL): Exceptions to WDL  Palpitations: None  Edema: No  Phlebitis: None  Superficial thrombophlebitis: None  Pulmonary  Respiratory (WDL): Exceptions to WDL  Cough: None  Dyspnea: Shortness of breath with minimal exertion, limiting instrumental ADL  Hypoxia: None  Gastrointestinal  Gastrointestinal (WDL): All gastrointestinal elements are within defined limits  Genitourinary  Genitourinary (WDL): All genitourinary elements are within defined limits  Lymphatic  Lymph (WDL): All lymph disorder elements are within defined limits  Musculoskeletal and Connective Tissue  Musculoskeletal and Connetive Tissue Disorders (WDL): Exceptions to WDL  Bone Pain: Moderate pain, limiting instrumental ADL(thoracic)  Muscle Weakness : Symptomatic, evident on physical exam, limiting instrumental ADL(gradually  x six months)  Myalgia:  Moderate pain, limiting instrumental ADL(chest)  Integumentary  Integumentary (WDL): Exceptions to WDL(flakey, forehead and cheeks)  Alopecia: None  Rash Maculo-Papular: None  Pruritus: None  Urticaria: None  Palmar-Plantar Erythrodysesthesia Syndrome: None  Flushing: None  Patient Coping  Patient Coping: Open/discussion  Distress Assessment  Distress Assessment Score: 1  Accompanied by     Oral Chemo Adherence         Past History  Past Medical History:   Diagnosis Date     BPH (benign prostatic hyperplasia)      Carotid stenosis, bilateral 04/07/2016    left 50-69%. moderate right side.     Chronic kidney disease (CKD), stage III (moderate) (H)      Colon polyps      COPD (chronic obstructive pulmonary disease) (H)      Epididymal cyst, left side. 04/07/2016     Hematuria 12/2002    Work-up was negative.     Hypertension      Inguinal hernia     Right     Nephritis     11 years old.     Peripheral vascular disease (H)      Polio     6 years of age. Right leg. Recovered fully.     Shingles 1996    left forehead.         Past Surgical History:   Procedure Laterality Date     COLONOSCOPY       CT BIOPSY LUNG  12/18/2019     CYSTOSCOPY  2002     FLEXIBLE BRONCHOSCOPY  08/14/2019    with EBUS FNA>     IR EXTREMITY ANGIOGRAM BILATERAL  04/06/2016     IR PORT PLACEMENT >5 YEARS  9/11/2019     NASAL SEPTUM SURGERY  1980s     TONSILLECTOMY  1948     US THORACENTESIS  5/7/2020       Physical Exam    Recent Vitals 6/22/2020   Height -   Weight 120 lbs 6 oz   BSA (m2) 1.57 m2   /84   Pulse 109   Temp 97.9   Temp src 1   SpO2 96   Some recent data might be hidden       GENERAL: Alert and oriented to time place and person. Seated comfortably. In no distress.  Thin build.  He appears anxious.    HEAD: Atraumatic and normocephalic.    EYES: DANYA, EOMI.  No pallor.  No icterus.    Oral cavity: no mucosal lesion or tonsillar enlargement.    NECK: supple. JVP normal.  No thyroid enlargement.    LYMPH NODES: No palpable,  cervical, axillary or inguinal lymphadenopathy.    CHEST: clear to auscultation bilaterally with no adventitious sounds heard.  Breath sounds are heard throughout the left side.  No breath sounds heard in the right infrascapular area.  Dullness to percussion in the right intrascapular area.  Resonant throughout on the left side.  Breath movements are better on the left side compared to the right side.    Port-A-Cath over the left upper chest looks unremarkable.    CVS: S1 and S2 are heard. Regular rate and rhythm.  No murmur or gallop or rub heard.  No peripheral edema.    ABDOMEN: Soft. Not tender. Not distended.  No palpable hepatomegaly or splenomegaly.  No other mass palpable.  Bowel sounds heard.    EXTREMITIES: Warm.    SKIN: no rash, or bruising or purpura.  Has a full head of hair.          Lab Results    Recent Results (from the past 24 hour(s))   Comprehensive Metabolic Panel    Collection Time: 06/22/20 11:06 AM   Result Value Ref Range    Sodium 135 (L) 136 - 145 mmol/L    Potassium 4.5 3.5 - 5.0 mmol/L    Chloride 102 98 - 107 mmol/L    CO2 23 22 - 31 mmol/L    Anion Gap, Calculation 10 5 - 18 mmol/L    Glucose 67 (L) 70 - 125 mg/dL    BUN 36 (H) 8 - 28 mg/dL    Creatinine 1.45 (H) 0.70 - 1.30 mg/dL    GFR MDRD Af Amer 57 (L) >60 mL/min/1.73m2    GFR MDRD Non Af Amer 47 (L) >60 mL/min/1.73m2    Bilirubin, Total 0.2 0.0 - 1.0 mg/dL    Calcium 9.7 8.5 - 10.5 mg/dL    Protein, Total 7.1 6.0 - 8.0 g/dL    Albumin 3.5 3.5 - 5.0 g/dL    Alkaline Phosphatase 86 45 - 120 U/L    AST 13 0 - 40 U/L    ALT 13 0 - 45 U/L   HM1 (CBC with Diff)    Collection Time: 06/22/20 11:06 AM   Result Value Ref Range    WBC 6.3 4.0 - 11.0 thou/uL    RBC 4.19 (L) 4.40 - 6.20 mill/uL    Hemoglobin 11.7 (L) 14.0 - 18.0 g/dL    Hematocrit 36.6 (L) 40.0 - 54.0 %    MCV 87 80 - 100 fL    MCH 27.9 27.0 - 34.0 pg    MCHC 32.0 32.0 - 36.0 g/dL    RDW 17.1 (H) 11.0 - 14.5 %    Platelets 254 140 - 440 thou/uL    MPV 10.3 8.5 - 12.5 fL     Neutrophils % 67 50 - 70 %    Lymphocytes % 19 (L) 20 - 40 %    Monocytes % 11 (H) 2 - 10 %    Eosinophils % 3 0 - 6 %    Basophils % 1 0 - 2 %    Neutrophils Absolute 4.2 2.0 - 7.7 thou/uL    Lymphocytes Absolute 1.2 0.8 - 4.4 thou/uL    Monocytes Absolute 0.7 0.0 - 0.9 thou/uL    Eosinophils Absolute 0.2 0.0 - 0.4 thou/uL    Basophils Absolute 0.0 0.0 - 0.2 thou/uL         Imaging    No results found.      Signed by: Jada Lopez MD

## 2021-06-09 NOTE — PROGRESS NOTES
Pre-Procedure Unconfirmed COVID Test     Jared DUNN Meggitt    COVID Screening  Due to the inability to confirm the patient's COVID status (positive or negative), the patient was screened for COVID symptoms     Patient reports the following:  Fever? No   Cough? No   Shortness of breath? No   Skin rash? No    If the patient is positive for new symptoms or worsening symptoms, and the procedure is deemed necessary by the ordering provider, notify your manager/supervisor     Patient Information  Patient informed of the no visitor policy  Patient instructed to continue to self-quarantine prior to procedure  Patient informed to contact the ordering provider if the following symptoms develop prior to procedure:   Fever  Cough  Shortness of Breath  Sore throat   Runny or stuffy nose  Muscle or body aches  Headaches  Fatigue  Vomiting or diarrhea   Rash    Joanna Villafuerte

## 2021-06-09 NOTE — PRE-PROCEDURE
Procedure Name: right tunneled pleural catheter placement with sedation   Date/Time: 7/9/2020 8:34 AM  Written consent obtained?: Yes  Risks and benefits: Risks, benefits and alternatives were discussed  Consent given by: patient  Expected level of sedation: moderate  ASA Class: Class 3- Severe systemic disease, definite functional limitations  Mallampati: Grade 2- soft palate, base of uvula, tonsillar pillars, and portion of posterior pharyngeal wall visible  Patient states understanding of procedure being performed: Yes  Patient's understanding of procedure matches consent: Yes  Procedure consent matches procedure scheduled: Yes  Appropriately NPO: yes  Lungs: lungs clear with good breath sounds bilaterally  Heart: normal heart sounds and rate  History & Physical reviewed: History and physical reviewed and no updates needed  Statement of review: I have reviewed the lab findings, diagnostic data, medications, and the plan for sedation

## 2021-06-09 NOTE — PROGRESS NOTES
Assessment and Plan:     1. Encounter for general adult medical examination with abnormal findings      2. Chronic kidney disease, stage IV (severe) (H)  Stable, followed by nephrologist    3. Peripheral vascular disease (H)  Symptomatic relief with Pletal.    4. Primary cancer of right upper lobe of lung (H)  He will start a new and different chemotherapy treatment.  Did not response to previous treatment.    The patient's current medical problems were reviewed.    I have had an Advance Directives discussion with the patient.  The following are part of a depression follow up plan for the patient:  emotional support assessment and emotional support education  The following health maintenance schedule was reviewed with the patient and provided in printed form in the after visit summary:   Health Maintenance   Topic Date Due     ADVANCE CARE PLANNING  12/11/1958     MEDICARE ANNUAL WELLNESS VISIT  12/11/2005     PNEUMOCOCCAL IMMUNIZATION 65+ HIGH/HIGHEST RISK (2 of 2 - PCV13) 11/10/2007     ZOSTER VACCINES (2 of 3) 03/19/2010     LIPID  05/27/2019     INFLUENZA VACCINE RULE BASED (1) 08/01/2020     FALL RISK ASSESSMENT  07/08/2021     TD 18+ HE  05/24/2022        Subjective:   Chief Complaint: Jared Moeller is an 79 y.o. male here for an Annual Wellness visit.   HPI: Did not response to previous chemotherapy, plan is to start a new and different experimental treatment.  Still having some malignant pleural effusion, plan is to place a drain of the right pleural space.  Blood pressure has been stable at home usually between 132 74, is compliant with hydrochlorothiazide 12.5 mg daily.  Peripheral vascular disease, symptomatic relief with cilostazol.  Mild seasonal allergy symptoms, using Flonase and sometimes Claritin as needed.  He quit smoking 2015    Review of Systems:   Please see above.  The rest of the review of systems are negative for all systems.    Patient Care Team:  Maria De Jesus Fregoso MD as PCP -  General (Family Medicine)  Jada Lopez MD as Physician (Hematology and Oncology)  Maria De Jesus Fregoso MD as Assigned PCP  Mckayla Ferrer RN as Oncology Nurse Navigator  Loren Denny MD as Physician (Radiation Oncology)  Mulugeta Rubin DO as Physician (Internal Medicine)  Merlin Mendenhall, CNP as Nurse Practitioner (Hematology and Oncology)     Patient Active Problem List   Diagnosis     Anaphylaxis Due To Bee Venom     Primary cancer of right upper lobe of lung (H)     Chronic kidney disease (CKD), stage III (moderate) (H)     Peripheral vascular disease (H)     Hypertension     Encounter for antineoplastic chemotherapy     Normochromic normocytic anemia     Gastroesophageal reflux disease with esophagitis     Right inguinal hernia     Malignant pleural effusion     Cancer associated pain     Chronic kidney disease, stage IV (severe) (H)     Past Medical History:   Diagnosis Date     BPH (benign prostatic hyperplasia)      Carotid stenosis, bilateral 04/07/2016    left 50-69%. moderate right side.     Chronic kidney disease (CKD), stage III (moderate) (H)      Colon polyps      COPD (chronic obstructive pulmonary disease) (H)      Epididymal cyst, left side. 04/07/2016     Hematuria 12/2002    Work-up was negative.     Hypertension      Inguinal hernia     Right     Nephritis     11 years old.     Peripheral vascular disease (H)      Polio     6 years of age. Right leg. Recovered fully.     Shingles 1996    left forehead.      Past Surgical History:   Procedure Laterality Date     COLONOSCOPY       CT BIOPSY LUNG  12/18/2019     CYSTOSCOPY  2002     FLEXIBLE BRONCHOSCOPY  08/14/2019    with EBUS FNA>     IR EXTREMITY ANGIOGRAM BILATERAL  04/06/2016     IR PORT PLACEMENT >5 YEARS  9/11/2019     IR TUNNELED PLEURAL CATHETER INSERTION  7/9/2020     NASAL SEPTUM SURGERY  1980s     TONSILLECTOMY  1948      THORACENTESIS  5/7/2020      Family History   Problem Relation Age of Onset      Ulcers Mother         did not make it after surgery.     Heart disease Father      Other Father         accidental death.     Pacemaker Sister      Other Brother         Polio.     No Medical Problems Son      No Medical Problems Son      Pneumonia Half Brother 81     No Medical Problems Half Brother      Multiple sclerosis Sister      No Medical Problems Sister       Social History     Socioeconomic History     Marital status:      Spouse name: Not on file     Number of children: Not on file     Years of education: Not on file     Highest education level: Not on file   Occupational History     Occupation: Retired.     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   Social Needs     Financial resource strain: Not on file     Food insecurity     Worry: Not on file     Inability: Not on file     Transportation needs     Medical: Not on file     Non-medical: Not on file   Tobacco Use     Smoking status: Former Smoker     Packs/day: 1.00     Years: 50.00     Pack years: 50.00     Last attempt to quit: 2015     Years since quittin.4     Smokeless tobacco: Never Used     Tobacco comment: used E-cigarettes for about a month only.    Substance and Sexual Activity     Alcohol use: Yes     Alcohol/week: 3.0 standard drinks     Types: 3 Standard drinks or equivalent per week     Comment: no more than 2 daily but not every day     Drug use: No     Sexual activity: Not on file   Lifestyle     Physical activity     Days per week: Not on file     Minutes per session: Not on file     Stress: Not on file   Relationships     Social connections     Talks on phone: Not on file     Gets together: Not on file     Attends Gnosticism service: Not on file     Active member of club or organization: Not on file     Attends meetings of clubs or organizations: Not on file     Relationship status: Not on file     Intimate partner violence     Fear of current or ex partner: Not on file     Emotionally abused: Not on file     Physically  abused: Not on file     Forced sexual activity: Not on file   Other Topics Concern     Not on file   Social History Narrative     Not on file      Current Outpatient Medications   Medication Sig Dispense Refill     acetaminophen (TYLENOL) 500 MG tablet Take 500 mg by mouth every 6 (six) hours as needed for pain.       albuterol (PROVENTIL HFA) 90 mcg/actuation inhaler Inhale 2 puffs every 6 (six) hours as needed for wheezing. 1 Inhaler 3     cholecalciferol, vitamin D3, 1,000 unit (25 mcg) tablet Take 1,000 Units by mouth daily.       cilostazol (PLETAL) 100 MG tablet Take 1 tablet (100 mg total) by mouth 2 (two) times a day. 180 tablet 3     dexAMETHasone (DECADRON) 4 MG tablet Take 8mg every 12 hours for 3 days, starting the day before receiving chemotherapy.. 48 tablet 0     diphenhydrAMINE-acetaminophen (TYLENOL PM EXTRA STRENGTH)  mg Tab Take 1 tablet by mouth at bedtime as needed.       epinephrine (EPIPEN INJ) Inject as directed as needed.       ferrous sulfate SR (SLOW FE) 142 mg (45 mg iron) TbER Take 45 mg by mouth daily with breakfast.       fluticasone propionate (FLONASE ALLERGY RELIEF) 50 mcg/actuation nasal spray 1 spray into each nostril daily. 16 g 12     hydroCHLOROthiazide (HYDRODIURIL) 12.5 MG tablet Take 1 tablet (12.5 mg total) by mouth daily. 90 tablet 1     HYDROcodone-acetaminophen 5-325 mg per tablet Take 1 tablet by mouth every 6 (six) hours as needed for pain. 30 tablet 0     Lactobacillus rhamnosus GG (CULTURELLE) 15 billion cell CpSP Take 1 capsule by mouth daily.        loratadine (CLARITIN) 10 mg tablet Take 10 mg by mouth daily.       melatonin 1 mg Tab tablet Take 3 mg by mouth at bedtime as needed.       prochlorperazine (COMPAZINE) 10 MG tablet Take 1 tablet (10 mg total) by mouth every 6 (six) hours as needed (For breakthrough nausea/vomiting). 30 tablet 1     tiotropium (SPIRIVA) 18 mcg inhalation capsule Place 1 capsule (2 puffs total) into inhaler and inhale daily. Crush  the capsule. Inhale the contents of the crushed capsule in 2 breaths. 90 capsule 3     No current facility-administered medications for this visit.       Objective:   Vital Signs: There were no vitals taken for this visit.     Weight: Unable to obtain due to video visit  Height: Unable to obtain due to video visit  BMI: Unable to obtain due to video visit  Blood Pressure: Unable to obtain due to video visit      VisionScreening:  No exam data present     PHYSICAL EXAM  Physical Examination: General appearance - alert, well appearing, and in no distress  Mental status - alert, oriented to person, place, and time  Eyes - pupils equal and reactive, extraocular eye movements intact  Ears -no visible external ear abnormalities  Nose - normal and patent, no visible erythema, discharge or polyps  Mouth - no visible abnormalities  Neck - supple, no visible significant adenopathy  Lymphatics - no visible lymphadenopathy, no visible hepatosplenomegaly  Chest -no audible wheezes, rales or rhonchi  Heart -no symptoms of arrhythmia  Abdomen - no visible organomegaly  Back exam - full range of motion  Neurological - alert, oriented, normal speech, no focal findings or movement disorder noted  Musculoskeletal - no visible deformity or swelling  Extremities -no visible cyanosis  Skin - normal coloration and turgor, no visible rashes,    Assessment Results 7/8/2020   Activities of Daily Living No help needed   Instrumental Activities of Daily Living No help needed   Some recent data might be hidden     A Mini-Cog score of 0-2 suggests the possibility of dementia, score of 3-5 suggests no dementia  Fall Risk not completed due to virtual visit; need for additional assessment in future face-to-face visit.   Cognitive Screen not completed due to virtual visit; need for additional assessment in future face-to-face visit.    Identified Health Risks:     He is at risk for lack of exercise and has been provided with information to increase  "physical activity for the benefit of his well-being.  Information regarding advance directives ([place]), including where he can download the appropriate form, was provided to the patient via the AVS.     Jared Moeller is a 79 y.o. male who is being evaluated via a billable video visit.      The patient has been notified of following:     \"This video visit will be conducted via a call between you and your physician/provider. We have found that certain health care needs can be provided without the need for an in-person physical exam.  This service lets us provide the care you need with a video conversation.  If a prescription is necessary we can send it directly to your pharmacy.  If lab work is needed we can place an order for that and you can then stop by our lab to have the test done at a later time.    Video visits are billed at different rates depending on your insurance coverage. Please reach out to your insurance provider with any questions.    If during the course of the call the physician/provider feels a video visit is not appropriate, you will not be charged for this service.\"    Patient has given verbal consent to a Video visit? Yes  How would you like to obtain your AVS? AVS Preference: MyChart.  Patient would like the video invitation sent by: Text to cell phone: [phone]  Will anyone else be joining your video visit? No        Video Start Time: 02:40pm    Additional provider notes: see other note      Video-Visit Details    Type of service:  Video Visit    Video End Time (time video stopped): 03:05pm  Originating Location (pt. Location): Home    Distant Location (provider location):  [facility] FAMILY [facility]/OB     Platform used for Video Visit: Krista Fregoso MD  "

## 2021-06-09 NOTE — TELEPHONE ENCOUNTER
Art sent a [employer] message today with the following questions. I replied to the best of my ability via telephone and referring to Dr. Lopez's note from 7/1. We discussed what palliative treatment means, side effects from taxotere, and treatment plan time. Art expressed understanding and will have any follow up questions to Merlin Mendenhall NP tomorrow and OV.    I have several questions about my chemo treatment tomorrow.  What are the odds that this will eliminate the cancer?  Are the doses low enough to not effect my kidneys?  How long will the treatments take?  What shape will I be in afterwards?  What are the alternatives if this chemo doesn't work?    Elsie Simmons

## 2021-06-09 NOTE — PROGRESS NOTES
Pt arrived at [facility] to see Merlin LIMA. Pt has Lung Cancer and is here for treatment.

## 2021-06-09 NOTE — PROGRESS NOTES
[facility] Hematology and Oncology Progress Note    Patient: Jared Moeller  MRN: [id number]  Date of Service: 07/15/2020        Reason for Visit:    D1C1 palliative Taxotere chemotherapy.     Assessment and Plan:    Cancer Staging    1. Primary cancer of right upper lobe of lung (H)    Clinical Stage IIIB (cT4, cN2, cM0)     79 y.o.     Personal history of COPD.  Quit smoking in 2015 with 46 year p/h.      07/31/19 CT chest - obstructing central right suprahilar mass and complete atelectasis right upper lobe. 8mm rounded indeterminate nodule at left lung base. Severe emphysema.    08/09/19 NM PET - 4.3 x 4.7 x 5.6 cm mass in the central right upper lobe involving the right suprahilar region and right lower paratracheal mediastinum by direct extension. Associated complete right upper lobe atelectasis. Separate FDG avid right lower paratracheal station 4 and subcarinal station 7 (SUVmax 7.2), adenopathy suspicious for arik metastases. Few prominent right anterior cardiophrenic nodes with low-level uptake are indeterminate but suspicious for additional early arik metastases. Mild areas of subpleural thickening in the posterior right chest associated with low-level but appreciable uptake. No pleural effusion. No suspicious focal uptake in the liver skeleton. Normal adrenal glands. Severe emphysema. Moderate atherosclerotic calcifications including the coronary arteries. 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 Marked prostate gland enlargement. Large left scrotal hydrocele. Moderate scattered changes in the spine.    08/14/19 FNA through the endobronchial ultrasound - showed moderately differentiated squamous cell cancer of the lung.  The mediastinal lymph nodes were also positive.    08/28/19 MR brain - No findings to suggest intracranial metastases. No evidence of an acute intracranial abnormality. Findings compatible with mild chronic small vessel  ischemic change.    08/30/19 US kidney - Both kidneys appear echogenic suggesting medical renal disease. There is mild bilateral renal atrophy.  Enlarged prostate gland.    There was concern that he may have pulmonary tuberculosis because the auramine rhodamine stain for acid-fast bacilli was positive and the aspirate from the lower paratracheal lymph node.    Kinyoun stain for AFB came back negative.      Mantoux was negative.      QuantiFERON test was negative.      Sputum AFB x3 was negative by staining and by PCR.      Cultures were negative.      Consult with  of infectious diseases.    Chemoradiation with a small chance of cure was recommended.  Considering his kidney failure, carboplatin rather than cisplatin will be used.     09/09 - 10/14/19 completed weekly carboplatin (AUC 2) + paclitaxel (50 mg/m ) chemotherapy concurrent with 30 fx / 6000 cGy EBRT.      09/11/19 - IR port-a-cath placement.    11/11/19 - 01/20/20 completed 3 cycles maintenance Durvalumab therapy.    12/02/19 NM PET - Mixed response with significant improvement in right suprahilar lung mass and hilar and mediastinal lymphadenopathy but progression of what appear to be right pleural metastases.    12/18/19 CT (R) lung nodule biopsy - c/w adenocarcinoma.  No targetable mutations.    01/22/20 NM PET - Progression of right pleural metastases. Partial favorable treatment response central right upper lobe lung cancer status post radiation therapy. Increased postradiation changes in the right greater than left lungs. No new site of metastasis.    01/22/20 consult with Dr Lopez - with progression of disease outside of the radiation field, he now has metastatic disease involving the pleural surface and chest wall likely because we could not treat him with a sufficiently high dose of  chemotherapy because of his kidney failure.  Art was informed that we no longer have the possibility of cure, rather palliative treatment.  Second line  therapy with nivolumab was recommended.    02/03 - 06/22/20 completed 6 cycles Nivolumab therapy.    02/14/20 - surgical consult for small right inguinal hernia - did not recommend surgery.    04/24/20 NM PET - mild progression of disease, however at this point there is a possibility that this could be pseudo-progression due to immune activation.    05/07/20 (R) US thoracentesis - 1.6 liters of serosanguineous fluid were removed and sent to lab.    06/30/20 NM PET - progression of disease with worsening right pleural metastases, increasing metabolic activity of a right cardiophrenic lymph node, and development of a new right supraclavicular lymph node.    07/01/20 consult with Dr Lopez - recommended palliative Taxotere chemotherapy.    07/09/20 IR Pleurex cath placed - 200 ml clear yellow fluid removed.  Patient states another 3-400 ml fluid was removed after cath inserted.     07/15/20:    CBC - neutrophilia (dex).  Plts WNL.  HgB stable @ 11.6.    Found to be mildly iron deficient at his nephrology appointment - started on SR-iron, one daily - tolerating OK.    CMP - mild hyponatremia.  Slightly worse CKD.  Resolved hypoalbuminemia.  Elevated BS (dex).    On HCTZ 12.5 mg/day through PCP for mild hypertension.    States doesn't drink as much fluid as he should.  Encouraged pushing oral fluids.  Only drinks 2 bottles water/day.  Instructed to increase to 3 bottles/day.    Taking only 1 protein shake/day.  Instructed to increase to 2 shakes/day.    Art presents alone anticipating D1C1 palliative Taxotere chemotherapy.  He had a Pleurex cath placed last week and noted improvement with his breathing since.  He drained the lung yesterday and only had 140 ml fluid in the past 5 days.  Stable mild fatigue, arthralgias and myalgias.  He has a history of PAD in his legs - used to walk a mile/day, now limited with the Covid-19 restrictions.  His appetite is only fair with early satiety, however his weight has remained quite  stable over the past 6 months.  He still notes thoracic back pain - managed with 3-4 ES Tylenol/day.  He is now on HCTZ 12.5 mg/day.         Art has read the printed information about Taxotere he was previously given to read.  Any residual questions were answered to his satisfaction.  He understands that Taxotere chemotherapy will not be curative, but palliative.    I reviewed potential side effects of Taxotere.    He has signed a consent form to be treated with chemotherapy.      He has taken the day before and this morning's dose of dexamethasone.  I reviewed instructions to take the dex the day before, of and after each cycle Taxotere chemotherapy with food.    He has picked up his prescription for Compazine as an antinausea medication from his pharmacy.  I encouraged being pro-active with treating any nausea or bowel issues.    Proceed with D1C1 palliative Taxotere chemotherapy.    Instructed if he would note a fever > 100.4F he is to alert us or be seen urgently as antibiotics or even hospitalization may be indicated.    Back pain - degenerative changes throughout the spine:     Managed with Norco (hydrocodone 5/325), 1-2/day + 0-2 ES Tylenol/day.      Reminded he can go up to 6 ES Tylenol tablets/day.      Opioid bowel prophylaxis reviewed.    I instructed Art to monitor his mild, stable shortness of breath.  If it would worsen, he is to let us know.      [facility] covid precautions were reviewed, stressing he will become neutropenic with chemotherapy.  Instructed to follow state and federal recommendations.    07/24/20 - f/u D10 C1 Taxotere with labs per Treatment Plan.    08/05/20 - follow up D1C2 palliative Taxotere chemotherapy with labs per Treatment Plan.       CT scan after every 2 cycles of chemotherapy.      2.  Chronic kidney disease stage III.      Likely a primary kidney disorder r/t the nephritis he had as a child.     Follows with Nephrology, Dr. Rubin of [facility] [place].      05/07/20  - Urology f/u with Dr Rubin:    Quite stable CKD since at least 2016     Off ACE - likely the cause of drop in creatinine.     No NSAIDs.     Good hydration.     No indication for renal biopsy at this time.     Risk for AIN with Nivolumab.      F/U 3-months.     Slightly worse CKD.    Encouraged pushing oral fluids.    Will give 500 ml SS IV today after chemotherapy.    ECOG Performance:     ECOG Performance Status: 1    Distress Assessment:    Distress Assessment Score: 3    Pain:    Pain Score (Initial OR Reassessment): 7  Managed with ES tylenol.         TT > 25 minutes face to face with > 50 % in counseling and coordination of care.    Merlin Mendenhall, CNP     CC: Maria De Jesus Fregoso MD Elizabeth Cameron, MD Mulugeta Rubin, DO  ____________________________________    Interim History:    Mr. Jared Moeller presents alone anticipating D1C1 palliative Taxotere chemotherapy.  He had a Pleurex cath placed last week and noted improvement with his breathing since.  He drained the lung yesterday and only had 140 ml fluid in the past 5 days.  Stable mild fatigue, arthralgias and myalgias.  He has a history of PAD in his legs - used to walk a mile/day, now limited with the Covid-19 restrictions.  His appetite is only fair with early satiety, however his weight has remained quite stable over the past 6 months.  He still notes thoracic back pain - managed with 3-4 ES Tylenol/day.  He is now on HCTZ 12.5 mg/day.  He denies concerning pain, fever or chills, visual or mentation disturbance, difficulty with bowels or urination, skin rash.    Pain Status:    Currently in Pain: Yes - addressed in A&P.    Review of Systems:    Constitutional  Constitutional (WDL): Exceptions to WDL  Fatigue: Fatigue relieved by rest  Neurosensory  Neurosensory (WDL): Exceptions to WDL  Eye   Eye Disorder (WDL): All eye disorder elements are within defined limits  Ear  Ear Disorder (WDL): All ear disorder elements are within defined  limits  Cardiovascular  Cardiovascular (WDL): All cardiovascular elements are within defined limits  Pulmonary  Respiratory (WDL): Exceptions to WDL  Dyspnea: Shortness of breath with moderate exertion  Gastrointestinal  Gastrointestinal (WDL): Exceptions to WDL  Constipation: Occasional or intermittent symptoms, occasional use of stool softeners, laxatives, dietary modification, or enema  Genitourinary  Genitourinary (WDL): All genitourinary elements are within defined limits  Lymphatic  Lymph (WDL): All lymph disorder elements are within defined limits  Musculoskeletal and Connective Tissue  Musculoskeletal and Connetive Tissue Disorders (WDL): Exceptions to WDL  Muscle Weakness : Symptomatic, perceived by patient but not evident on physical exam  Myalgia: Moderate pain, limiting instrumental ADL  Integumentary  Integumentary (WDL): Exceptions to WDL(Pleurix drain)  Patient Coping  Patient Coping: Accepting  Distress Assessment  Distress Assessment Score: 3  Accompanied by  Accompanied by: Alone  Oral Chemo Adherence       Past History:    Past Medical History:   Diagnosis Date     BPH (benign prostatic hyperplasia)      Carotid stenosis, bilateral 04/07/2016    left 50-69%. moderate right side.     Chronic kidney disease (CKD), stage III (moderate) (H)      Colon polyps      COPD (chronic obstructive pulmonary disease) (H)      Epididymal cyst, left side. 04/07/2016     Hematuria 12/2002    Work-up was negative.     Hypertension      Inguinal hernia     Right     Nephritis     11 years old.     Peripheral vascular disease (H)      Polio     6 years of age. Right leg. Recovered fully.     Shingles 1996    left forehead.         Past Surgical History:   Procedure Laterality Date     COLONOSCOPY       CT BIOPSY LUNG  12/18/2019     CYSTOSCOPY  2002     FLEXIBLE BRONCHOSCOPY  08/14/2019    with EBUS FNA>     IR EXTREMITY ANGIOGRAM BILATERAL  04/06/2016     IR PORT PLACEMENT >5 YEARS  9/11/2019     IR TUNNELED PLEURAL  CATHETER INSERTION  7/9/2020     NASAL SEPTUM SURGERY  1980s     TONSILLECTOMY  1948      THORACENTESIS  5/7/2020       Physical Exam:    Recent Vitals 7/15/2020   Weight 118 lbs   BSA (m2) 1.55 m2   /73   Pulse 126   Temp 97.9   Temp src 1   SpO2 92   Some recent data might be hidden       GENERAL:   Very pleasant.  Alert and oriented.  Comfortable.  In no acute distress.     HEENT:   Atraumatic and normocephalic.  DANYA.  EOMI.  No pallor.  No icterus. No mucosal lesions.    LYMPH NODES:  No palpable cervical, axillary or inguinal lymphadenopathy.    CHEST:   Diminished breath sounds bilaterally but quite clear.  Pleurex cath in place and secure (R) chest.      Port-a-cath in place.  No s/s infection.    CVS:    S1 and S2 aheard.  Regular rate and rhythm. No murmur, gallop or rub heard.  No peripheral edema.    ABDOMEN:   Soft.  Not tender.  Not distended.  No palpable hepatomegaly or splenomegaly, masses or ascites.     EXTREMITIES:  Warm.    SKIN:    No rash, bruising or purpura noted.    Lab Results:    Reviewed with patient.    Recent Results (from the past 24 hour(s))   Comprehensive Metabolic Panel   Result Value Ref Range    Sodium 134 (L) 136 - 145 mmol/L    Potassium 4.1 3.5 - 5.0 mmol/L    Chloride 101 98 - 107 mmol/L    CO2 22 22 - 31 mmol/L    Anion Gap, Calculation 11 5 - 18 mmol/L    Glucose 295 (H) 70 - 125 mg/dL    BUN 56 (H) 8 - 28 mg/dL    Creatinine 1.81 (H) 0.70 - 1.30 mg/dL    GFR MDRD Af Amer 44 (L) >60 mL/min/1.73m2    GFR MDRD Non Af Amer 36 (L) >60 mL/min/1.73m2    Bilirubin, Total 0.2 0.0 - 1.0 mg/dL    Calcium 10.2 8.5 - 10.5 mg/dL    Protein, Total 7.3 6.0 - 8.0 g/dL    Albumin 3.5 3.5 - 5.0 g/dL    Alkaline Phosphatase 90 45 - 120 U/L    AST 10 0 - 40 U/L    ALT <9 0 - 45 U/L   HM1 (CBC with Diff)   Result Value Ref Range    WBC 14.9 (H) 4.0 - 11.0 thou/uL    RBC 4.17 (L) 4.40 - 6.20 mill/uL    Hemoglobin 11.6 (L) 14.0 - 18.0 g/dL    Hematocrit 36.0 (L) 40.0 - 54.0 %    MCV 86  80 - 100 fL    MCH 27.8 27.0 - 34.0 pg    MCHC 32.2 32.0 - 36.0 g/dL    RDW 16.4 (H) 11.0 - 14.5 %    Platelets 273 140 - 440 thou/uL    MPV 10.5 8.5 - 12.5 fL    Neutrophils % 95 (H) 50 - 70 %    Lymphocytes % 3 (L) 20 - 40 %    Monocytes % 2 2 - 10 %    Eosinophils % 0 0 - 6 %    Basophils % 0 0 - 2 %    Neutrophils Absolute 14.1 (H) 2.0 - 7.7 thou/uL    Lymphocytes Absolute 0.5 (L) 0.8 - 4.4 thou/uL    Monocytes Absolute 0.2 0.0 - 0.9 thou/uL    Eosinophils Absolute 0.0 0.0 - 0.4 thou/uL    Basophils Absolute 0.0 0.0 - 0.2 thou/uL       Imaging:    No new imaging.

## 2021-06-09 NOTE — PATIENT INSTRUCTIONS - HE
Recent Results (from the past 24 hour(s))   Comprehensive Metabolic Panel   Result Value Ref Range    Sodium 134 (L) 136 - 145 mmol/L    Potassium 4.1 3.5 - 5.0 mmol/L    Chloride 101 98 - 107 mmol/L    CO2 22 22 - 31 mmol/L    Anion Gap, Calculation 11 5 - 18 mmol/L    Glucose 295 (H) 70 - 125 mg/dL    BUN 56 (H) 8 - 28 mg/dL    Creatinine 1.81 (H) 0.70 - 1.30 mg/dL    GFR MDRD Af Amer 44 (L) >60 mL/min/1.73m2    GFR MDRD Non Af Amer 36 (L) >60 mL/min/1.73m2    Bilirubin, Total 0.2 0.0 - 1.0 mg/dL    Calcium 10.2 8.5 - 10.5 mg/dL    Protein, Total 7.3 6.0 - 8.0 g/dL    Albumin 3.5 3.5 - 5.0 g/dL    Alkaline Phosphatase 90 45 - 120 U/L    AST 10 0 - 40 U/L    ALT <9 0 - 45 U/L   HM1 (CBC with Diff)   Result Value Ref Range    WBC 14.9 (H) 4.0 - 11.0 thou/uL    RBC 4.17 (L) 4.40 - 6.20 mill/uL    Hemoglobin 11.6 (L) 14.0 - 18.0 g/dL    Hematocrit 36.0 (L) 40.0 - 54.0 %    MCV 86 80 - 100 fL    MCH 27.8 27.0 - 34.0 pg    MCHC 32.2 32.0 - 36.0 g/dL    RDW 16.4 (H) 11.0 - 14.5 %    Platelets 273 140 - 440 thou/uL    MPV 10.5 8.5 - 12.5 fL    Neutrophils % 95 (H) 50 - 70 %    Lymphocytes % 3 (L) 20 - 40 %    Monocytes % 2 2 - 10 %    Eosinophils % 0 0 - 6 %    Basophils % 0 0 - 2 %    Neutrophils Absolute 14.1 (H) 2.0 - 7.7 thou/uL    Lymphocytes Absolute 0.5 (L) 0.8 - 4.4 thou/uL    Monocytes Absolute 0.2 0.0 - 0.9 thou/uL    Eosinophils Absolute 0.0 0.0 - 0.4 thou/uL    Basophils Absolute 0.0 0.0 - 0.2 thou/uL

## 2021-06-09 NOTE — PROGRESS NOTES
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    Assessment and Plan  Cancer Staging  Primary cancer of right upper lobe of lung (H)  Staging form: Lung, AJCC 8th Edition  - Clinical stage from 8/20/2019: Stage IIIB (cT4, cN2, cM0) - Signed by Jada Lopez MD on 9/4/2019      ECOG Performance   ECOG Performance Status: 1    Distress Assessment   : Please see the discussion below.  Pain  Pain Score (Initial OR Reassessment): 4: Please see the discussion below.    Mr. Jared Moeller is a 79 y.o. gentleman with COPD, history of smoking which he quit in 2015.  He had some old lung nodules but was found in July 2019 to have a right upper lobe lung mass, in the right suprahilar area contiguous with the mediastinum.  The mass in the PET CT scan was 5.6 x 4.7 x 4.3 cm in size and PET positive.  This involved the right suprahilar region and a right lower paratracheal mediastinum with direct extension.  There was complete collapse of the right upper lobe with involvement of the mediastinum, possibly the right pericardial region and possibly the right pleura in the right lower lobe region.  The FNA done through the endobronchial ultrasound on 8/14/2019 showed moderately differentiated squamous cell cancer of the lung.  The mediastinal lymph nodes were also positive.    He has a chronic kidney disease stage III.  I think he has some sort of primary kidney disorder.  Likely related to the nephritis that he had as a child.  He has been referred to nephrology and was seen by Dr. Rubin of [facility] [place].  Work-up is ongoing.   Our plan is for radiation along with chemotherapy with weekly carboplatin AUC of 2 and paclitaxel at 50 mg/m .  After he is done with chemotherapy and radiation we will go with Durvalumab IV given every 2 weeks.  He started  chemotherapy and radiation on 9/9/2019.  He received the last day of chemotherapy on 10/14/2019 and received the last day of radiation on 10/18/2019.  He started postradiation durvalumab on 11/11/2019.    He had a follow-up PET CT scan after the chemoradiation done on 12/2/2019.  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  When we look at the previous PET CT scan from 8/9/2019 there was some faint uptake in those areas.  Most likely that her disease was there at that time and it has grown.    He had a CT-guided biopsy from the most prominent lesion on the right-sided pleura done on 12/18/2019.  Pathology was positive for malignant cells.  The pathologist felt that the tumor morphology and immunohistochemical profile is most consistent with an adenocarcinoma.  This raises the possibility that he had a second cancer different from the original squamous cell cancer that he had at diagnosis in the central area of the chest.    We obtained additional pathology testing done.  In the next generation sequencing done at the [facility] molecular lab, we did not find any targetable mutations.  We then requested PDL 1 testing and that has been reported as not enough cells to give us a reliable answer.    The PET CT scan that was done on 1/22/2020, he had progression of disease.  The pleural mets on the right side chest wall had increased in size and PET activity.  The pleural effusion was also increasing in size.  After discussion with pathology, it appeared that the second biopsy from the pleural metastasis that was done on 12/18/2019 showed metastatic disease review of the lung cancer and not a second cancer.  Thus he clearly had progression of disease after  chemotherapy and radiation.  We decided to get him started on second line of treatment with nivolumab because of his other comorbidities, especially CKD.  He started Nivolumab on 2/3/2020.    The PET CT scan that was done on 4/24/2020, after he had completed 3 cycles of nivolumab, there is some progression of the right pleural mets with increase in size by a few millimeters and some PET activity.  The right cardiophrenic lymph node was also increased slightly.  I think the pleural effusion was also a bit more on the right side.  We decided to continue with nivolumab and see whether it is effective down the line.  Another thoracentesis done on 5/7/2020 where 1.6 L of serosanguineous fluid was removed and sent to the lab.  He received cycle 6 of nivolumab on 6/22/2020.  In the PET CT scan that was done on 6/30/2020 there is increase in the right-sided malignant pleural effusion and increased size and PET uptake of the metastatic deposits all around the chest wall and around the pleural effusion and also in the mediastinal lymph nodes.  Thus it appeared that durvalumab was no longer keeping the cancer under control.    He received cycle #1 of chemotherapy with Taxotere on 7/15/2020.  He chose not to take Ramucirumab since the risk did not appear to him worth the extra month of increased life expectancy.    1.  He is here for a midcycle check after receiving the first cycle of Taxotere chemotherapy.  Overall I think he has tolerated the chemotherapy fairly well.  He did have some body aches etc. but they were well controlled.  On physical examination he looks well overall.  I think he still has some fluid left in the right lower chest since he has dullness in the right intrascapular area.  Labs from today were reviewed.  He is neutropenic now with an ANC of 100.  Hemoglobin and platelets are stable.  Metabolic panel is also overall stable.  Creatinine has not gone up much after the Taxotere chemotherapy even though he  still has CKD stage IV.    2.  We discussed about managing the pain.  I told him that he can continue using the hydrocodone acetaminophen tablets as needed.  If he wants he can also use Tylenol but he has to remember that the hydrocodone acetaminophen tablets also have acetaminophen in them.  He should aim to keep the total acetaminophen below 3 g/day which is approximately less than 6 tablets in the whole day taking both together.  He voiced understanding.    3.  We discussed about managing the opioid-induced constipation.  Encouraged him to use a laxative as needed to have a bowel movement every day.  I pointed out to him that if he becomes really constipated then when he gets restarted again he will need to empty out and that will be quite distressing.    4.  Regarding the malignant pleural effusion on the right side, encouraged him to continue to drain the Pleurx catheter as needed.  The fact that the drainage has come down a day by day is encouraging that the pleural effusion is not reaccumulating.  He voiced understanding.    5.  I discussed with him that his ANC is down to 100.  This is the time when he is the most immunosuppressed.  If he has a fever or if he feels sick he should call us immediately.  He has to be prepared to go to the ER and be admitted.  He voiced understanding.    6.  We are planning for a CT scan of the chest abdomen and pelvis with oral contrast after 2 cycles of Taxotere chemotherapy.    7.  Follow-up: Return to clinic as already scheduled on August 5 for labs according to treatment plan, provider visit and appointment for cycle #2 of Taxotere chemotherapy.    Time spend >25 minutes face to face with the patient. More than 50 % in counseling and coordination of care.      Problem List    1. Primary cancer of right upper lobe of lung (H)  CC OFFICE VISIT LONG   2. Malignant pleural effusion     3. Cancer associated pain     4. Constipation due to opioid therapy          CC: Ildefonso  Maria De Jesus MCINTOSH MD    Mulugeta Rubin, DO    ______________________________________________________________________________    History of Present Illness    Mr. Jared Moeller is here for follow-up alone.    He is here for a midcycle check after receiving the first cycle of Taxotere chemotherapy.  Overall he feels that he did tolerate the chemotherapy okay.  He did have some aches in the back as before and some muscle aches and a feeling of sensitivity of the skin after the chemotherapy.  He says those symptoms have really come down now.  He did take the hydrocodone acetaminophen tablets with benefit for the pain and he did have a response.  In between he tried a couple of tablets of dexamethasone but that did not make much difference even though in the initial couple of days after chemotherapy he felt that the dexamethasone made him feel better.    When he was taking the hydrocodone acetaminophen tablets he did become constipated for a couple of days and after that when the bowel movements started again he had to be in the bathroom quite a bit.  That has cleared out now.  He is now having bowel movements every day.    He states that his breathing is quite stable and about the same as earlier.  He does not have any cough.  He is not doing that much with physical activity outside the house nowadays because of the coronavirus pandemic.  He feels that he is eating well.  He has not lost any weight.  He has not lost hair.    He does have some little bit of difficulty with sleep.    He has brought in a record of the drainage from the Pleurx catheter.  It has come down progressively after the catheter was inserted.  On 7/14/2020 he drained 140 mL and drainage was every 5 days.  By 7/23/2028 is down to 65 mL and the drainage is every 9 days.    No fevers or night sweats.  No lumps or bumps anywhere.  No unusual headaches.  No eyesight problems.  No mouth sores.  No swallowing difficulty.  No nausea or vomiting.  No  abdominal pain.  No diarrhea.  No blood in stool or black stools.  He is passing urine well.  No skin rashes.  No numbness or tingling.  No chest pain or palpitation.    Please see below.  A 14 point review of system is otherwise completely negative.      Pain Status  Currently in Pain: Yes    Review of Systems    Constitutional  Constitutional (WDL): Exceptions to WDL  Fatigue: Fatigue relieved by rest  Fever: None  Chills: None  Weight Gain: None  Weight Loss: None  Neurosensory  Neurosensory (WDL): Exceptions to WDL  Peripheral Motor Neuropathy: None  Ataxia: None  Peripheral Sensory Neuropathy: None  Confusion: None  Syncope: None  Eye   Eye Disorder (WDL): All eye disorder elements are within defined limits  Ear  Ear Disorder (WDL): All ear disorder elements are within defined limits  Cardiovascular  Cardiovascular (WDL): Exceptions to WDL  Palpitations: None  Edema: No  Phlebitis: None  Superficial thrombophlebitis: None  Pulmonary  Respiratory (WDL): Exceptions to WDL  Cough: None  Dyspnea: Shortness of breath with moderate exertion  Hypoxia: None  Gastrointestinal  Gastrointestinal (WDL): Exceptions to WDL  Anorexia: None  Constipation: Occasional or intermittent symptoms, occasional use of stool softeners, laxatives, dietary modification, or enema(when using narcotics)  Diarrhea: None  Dysphagia: None  Esophagitis: None  Nausea: None  Pharyngitis: None  Vomiting: None  Dysgeusia: Altered taste but no change in diet  Dry Mouth: None  Genitourinary  Genitourinary (WDL): All genitourinary elements are within defined limits  Lymphatic  Lymph (WDL): All lymph disorder elements are within defined limits  Musculoskeletal and Connective Tissue  Musculoskeletal and Connetive Tissue Disorders (WDL): Exceptions to WDL  Arthralgia: None  Bone Pain: Moderate pain, limiting instrumental ADL(thoracic)  Muscle Weakness : Symptomatic, perceived by patient but not evident on physical exam  Myalgia: Moderate pain, limiting  instrumental ADL(general)  Integumentary  Integumentary (WDL): Exceptions to WDL(Pleurix drain)  Alopecia: None  Rash Maculo-Papular: None  Pruritus: None  Urticaria: None  Palmar-Plantar Erythrodysesthesia Syndrome: None  Flushing: None  Patient Coping  Patient Coping: Open/discussion  Distress Assessment     Accompanied by     Oral Chemo Adherence         Past History  Past Medical History:   Diagnosis Date     BPH (benign prostatic hyperplasia)      Carotid stenosis, bilateral 04/07/2016    left 50-69%. moderate right side.     Chronic kidney disease (CKD), stage III (moderate) (H)      Colon polyps      COPD (chronic obstructive pulmonary disease) (H)      Epididymal cyst, left side. 04/07/2016     Hematuria 12/2002    Work-up was negative.     Hypertension      Inguinal hernia     Right     Nephritis     11 years old.     Peripheral vascular disease (H)      Polio     6 years of age. Right leg. Recovered fully.     Shingles 1996    left forehead.         Past Surgical History:   Procedure Laterality Date     COLONOSCOPY       CT BIOPSY LUNG  12/18/2019     CYSTOSCOPY  2002     FLEXIBLE BRONCHOSCOPY  08/14/2019    with EBUS FNA>     IR EXTREMITY ANGIOGRAM BILATERAL  04/06/2016     IR PORT PLACEMENT >5 YEARS  9/11/2019     IR TUNNELED PLEURAL CATHETER INSERTION  7/9/2020     NASAL SEPTUM SURGERY  1980s     TONSILLECTOMY  1948     US THORACENTESIS  5/7/2020       Physical Exam    Recent Vitals 7/24/2020   Weight 119 lbs 3 oz   BSA (m2) 1.56 m2   /72   Pulse 123   Temp 98   Temp src 1   SpO2 97   Some recent data might be hidden       GENERAL: Alert and oriented to time place and person. Seated comfortably. In no distress.  He looks well overall.  Thin build.    HEAD: Atraumatic and normocephalic.    EYES: DANYA, EOMI.  No pallor.  No icterus.    Oral cavity: no mucosal lesion or tonsillar enlargement.    NECK: supple. JVP normal.  No thyroid enlargement.    LYMPH NODES: No palpable, cervical, axillary or  inguinal lymphadenopathy.    CHEST: Left chest is clear to auscultation.  Right chest has diminished breath sounds in the intrascapular area.  Left side is resonant to percussion throughout.  Right chest is dull to percussion in the intrascapular area.  Breath movements are better on the left side compared to the right side.  Pleurx catheter is present in the right lower chest wall and covered by a clean dressing.  The Port-A-Cath over the left upper chest looks unremarkable.    CVS: S1 and S2 are heard. Regular rate and rhythm.  No murmur or gallop or rub heard.  No peripheral edema.    ABDOMEN: Soft. Not tender. Not distended.  No palpable hepatomegaly or splenomegaly.  No other mass palpable.  Bowel sounds heard.    EXTREMITIES: Warm.    SKIN: no rash, or bruising or purpura.  Has a full head of hair.        Lab Results    Recent Results (from the past 24 hour(s))   Protein / creatinine ratio, urine    Collection Time: 07/24/20 12:38 PM   Result Value Ref Range     Protein, Random Urine 8 mg/dL    Creatinine, Urine 35.7 mg/dL    Protein/Creatinine Ratio, Random Urine 0.22    Comprehensive Metabolic Panel    Collection Time: 07/24/20 12:38 PM   Result Value Ref Range    Sodium 134 (L) 136 - 145 mmol/L    Potassium 4.2 3.5 - 5.0 mmol/L    Chloride 99 98 - 107 mmol/L    CO2 25 22 - 31 mmol/L    Anion Gap, Calculation 10 5 - 18 mmol/L    Glucose 104 70 - 125 mg/dL    BUN 30 (H) 8 - 28 mg/dL    Creatinine 1.36 (H) 0.70 - 1.30 mg/dL    GFR MDRD Af Amer >60 >60 mL/min/1.73m2    GFR MDRD Non Af Amer 51 (L) >60 mL/min/1.73m2    Bilirubin, Total 0.4 0.0 - 1.0 mg/dL    Calcium 9.2 8.5 - 10.5 mg/dL    Protein, Total 6.7 6.0 - 8.0 g/dL    Albumin 3.2 (L) 3.5 - 5.0 g/dL    Alkaline Phosphatase 78 45 - 120 U/L    AST 14 0 - 40 U/L    ALT 25 0 - 45 U/L   HM1 (CBC with Diff)    Collection Time: 07/24/20 12:38 PM   Result Value Ref Range    WBC 1.2 (LL) 4.0 - 11.0 thou/uL    RBC 3.98 (L) 4.40 - 6.20 mill/uL    Hemoglobin 11.1 (L)  14.0 - 18.0 g/dL    Hematocrit 34.9 (L) 40.0 - 54.0 %    MCV 88 80 - 100 fL    MCH 27.9 27.0 - 34.0 pg    MCHC 31.8 (L) 32.0 - 36.0 g/dL    RDW 16.4 (H) 11.0 - 14.5 %    Platelets 252 140 - 440 thou/uL    MPV 10.6 8.5 - 12.5 fL   Manual Differential    Collection Time: 07/24/20 12:38 PM   Result Value Ref Range    Total Neutrophils % 6 (L) 50 - 70 %    Lymphocytes % 56 (H) 20 - 40 %    Monocytes % 30 (H) 2 - 10 %    Eosinophils %  8 (H) 0 - 6 %    Basophils % 0 0 - 2 %    Total Neutrophils Absolute 0.1 (L) 2.0 - 7.7 thou/ul    Lymphocytes Absolute 0.7 (L) 0.8 - 4.4 thou/uL    Monocytes Absolute 0.4 0.0 - 0.9 thou/uL    Eosinophils Absolute 0.1 0.0 - 0.4 thou/uL    Basophils Absolute 0.0 0.0 - 0.2 thou/uL    Platelet Estimate Normal Normal         Imaging    Nm Pet Ct Skull To Mid Thigh    Result Date: 6/30/2020  EXAM: NM PET CT SKULL TO MID THIGH LOCATION: [facility] DATE/TIME: 6/30/2020 2:40 PM INDICATION: Subsequent treatment strategy and restaging for malignant neoplasm of upper lobe, right bronchus or lung. Status post radiation therapy to the right upper lobe completed in October 2019 followed by systemic immunotherapy. Monitor treatment response. COMPARISON: FDG PET/CT dated 04/24/2020 TECHNIQUE: Serum glucose level 84 mg/dL. One hour post intravenous administration of 9.4 mCi F-18 FDG, PET imaging was performed from the skull base to the mid thighs utilizing attenuation correction with concurrent axial CT and PET/CT image fusion. Dose  reduction techniques were used. FINDINGS: Continued interval increase in the metabolic activity and number of FDG avid right pleural metastasis (max SUV 7.1, previously 5.6) with increasing metabolic activity of the right cardiophrenic lymph node (max SUV 9.5, previously 5.9) and the development 7 mm right supraclavicular lymph node (max SUV 3.2) suggesting progression of disease. Persistent presumed inflammatory change in the right hilar region (Max SUV 2.9,  previously 2.9)  mild senescent intracranial changes. Left chest port with tip terminating near the superior cavoatrial junction. Moderate coronary artery calcium. Severe upper lung predominant emphysema. Large right pleural effusion. Sigmoid diverticulosis. Pelvic phleboliths. Mild prostamegaly with in situ calcifications. Left hydrocele. Multilevel degenerative changes of the spine.     Findings suspicious for progression of disease with worsening right pleural metastases, increasing metabolic activity of a right cardiophrenic lymph node, and development of a new right supraclavicular lymph node.    Ir Tunneled Pleural Catheter Insertion    Result Date: 7/9/2020  [place] [facility] LOCATION: [facility] DATE: 7/9/2020 PROCEDURE: ULTRASOUND AND FLUOROSCOPIC-GUIDED TUNNELED PLEURAL DRAINAGE CATHETER PLACEMENT. INTERVENTIONAL RADIOLOGIST: Peter Wilks MD. INDICATION: 79-year-old male with a malignant right pleural effusion. CONSENT: The risks, benefits and alternatives of tunneled pleural drainage catheter placement were discussed with the patient  in detail. All questions were answered. Informed consent was given to proceed with the procedure. MODERATE SEDATION: Versed 2 mg IV; Fentanyl 100 mcg IV.  Under physician supervision, Versed and fentanyl were administered for moderate sedation. Pulse oximetry, heart rate and blood pressure were continuously monitored by an independent trained observer. The physician spent 30 minutes of face-to-face sedation time with the patient. CONTRAST: None. ANTIBIOTICS: Ancef 2 grams IV. ADDITIONAL MEDICATIONS: None. FLUOROSCOPIC TIME: 0.2 minutes. RADIATION DOSE: Air Kerma: 3 mGy. COMPLICATIONS: No immediate complications. STERILE BARRIER TECHNIQUE: Maximum sterile barrier technique was used. Cutaneous antisepsis was performed at the operative site with application of 2% chlorhexidine and large sterile drape. Prior to the procedure, the  and assistant performed hand  hygiene and wore hat, mask, sterile gown, and sterile gloves during the entire procedure. PROCEDURE:  Under real-time sonographic guidance, an 18 gauge needle was inserted into the right  pleural fluid from a lateral low intercostal approach. A wire was coiled within the right pleural cavity. A subcutaneous tunnel was created along the anterior chest requiring a second incision. Using this access, a tunneled drainage catheter was placed under fluoroscopic guidance with its tip in the pleural space. 200 mL of clear yellow fluid were aspirated. The initial dermatotomy was closed with 3-0 absorbable suture and overlying surgical glue. The catheter was sutured to the skin at the tunnel exit site. The catheter may be used immediately. FINDINGS: Ultrasound shows a moderate-sized right pleural effusion. A permanent image was stored.   200 mL of clear yellow ascitic fluid were aspirated. At the completion of the study, the catheter tubing lies along the basilar pleural space.     1.  Successful tunneled pleural drainage catheter placement, as discussed above. 2.  The catheter is ready for use immediately. ____________________________________________________________________ CPT codes for physician reference only: [id number]         Signed by: Jada Lopez MD

## 2021-06-10 NOTE — TELEPHONE ENCOUNTER
I called Kendall to ask how he is doing and offer support and resources as needed.  Kendall said he's been doing ok.  The immunotherapy was no longer working for him so he is back on chemo.  He had his second infusion on Tuesday.  The pain in his left chest has gone away but he still has some mild pain in his right chest, though this is lessening.  He's actually feeling better since he got off the immunotherapy and side effects seem to be resolving.  He has a pleural catheter in place and Pat helps with draining the catheter and dressing changes.  The fluid out put is going down with each draining.  Kendall said he's started to lose his hair.  He picked up some hats and has a lighter weight knit hat to wear in warmer weather.    Kendall stated he and Pat are doing well at home and he does not identify a need for additional resources at this time.  I will continue to check in with him by phone and I invited calls as well.

## 2021-06-10 NOTE — PROGRESS NOTES
[facility] Hematology and Oncology Progress Note    Patient: Jared Moeller  MRN: [id number]  Date of Service: 08/11/2020        Reason for Visit:    D1C2 palliative Taxotere chemotherapy.     Assessment and Plan:    Cancer Staging    1. Primary cancer of right upper lobe of lung (H)    Clinical Stage IIIB (cT4, cN2, cM0)     79 y.o.     Personal history of COPD.  Quit smoking in 2015 with 46 year p/h.      07/31/19 CT chest - obstructing central right suprahilar mass and complete atelectasis right upper lobe. 8mm rounded indeterminate nodule at left lung base. Severe emphysema.    08/09/19 NM PET - 4.3 x 4.7 x 5.6 cm mass in the central right upper lobe involving the right suprahilar region and right lower paratracheal mediastinum by direct extension. Associated complete right upper lobe atelectasis. Separate FDG avid right lower paratracheal station 4 and subcarinal station 7 (SUVmax 7.2), adenopathy suspicious for arik metastases. Few prominent right anterior cardiophrenic nodes with low-level uptake are indeterminate but suspicious for additional early arik metastases. Mild areas of subpleural thickening in the posterior right chest associated with low-level but appreciable uptake. No pleural effusion. No suspicious focal uptake in the liver skeleton. Normal adrenal glands. Severe emphysema. Moderate atherosclerotic calcifications including the coronary arteries. 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 Marked prostate gland enlargement. Large left scrotal hydrocele. Moderate scattered changes in the spine.    08/14/19 FNA through the endobronchial ultrasound - showed moderately differentiated squamous cell cancer of the lung.  The mediastinal lymph nodes were also positive.    08/28/19 MR brain - No findings to suggest intracranial metastases. No evidence of an acute intracranial abnormality. Findings compatible with mild chronic small vessel  ischemic change.    08/30/19 US kidney - Both kidneys appear echogenic suggesting medical renal disease. There is mild bilateral renal atrophy.  Enlarged prostate gland.    There was concern that he may have pulmonary tuberculosis because the auramine rhodamine stain for acid-fast bacilli was positive and the aspirate from the lower paratracheal lymph node.    Kinyoun stain for AFB came back negative.      Mantoux was negative.      QuantiFERON test was negative.      Sputum AFB x3 was negative by staining and by PCR.      Cultures were negative.      Consult with  of infectious diseases.    Chemoradiation with a small chance of cure was recommended.  Considering his kidney failure, carboplatin rather than cisplatin will be used.     09/09 - 10/14/19 completed weekly carboplatin (AUC 2) + paclitaxel (50 mg/m ) chemotherapy concurrent with 30 fx / 6000 cGy EBRT.      09/11/19 - IR port-a-cath placement.    11/11/19 - 01/20/20 completed 3 cycles maintenance Durvalumab therapy.    12/02/19 NM PET - Mixed response with significant improvement in right suprahilar lung mass and hilar and mediastinal lymphadenopathy but progression of what appear to be right pleural metastases.    12/18/19 CT (R) lung nodule biopsy - c/w adenocarcinoma.  No targetable mutations.    01/22/20 NM PET - Progression of right pleural metastases. Partial favorable treatment response central right upper lobe lung cancer status post radiation therapy. Increased postradiation changes in the right greater than left lungs. No new site of metastasis.    01/22/20 consult with Dr Lopez - with progression of disease outside of the radiation field, he now has metastatic disease involving the pleural surface and chest wall likely because we could not treat him with a sufficiently high dose of  chemotherapy because of his kidney failure.  Art was informed that we no longer have the possibility of cure, rather palliative treatment.  Second line  "therapy with nivolumab was recommended.    02/03 - 06/22/20 completed 6 cycles Nivolumab therapy.    02/14/20 - surgical consult for small right inguinal hernia - did not recommend surgery.    04/24/20 NM PET - mild progression of disease, however at this point there is a possibility that this could be pseudo-progression due to immune activation.    05/07/20 (R) US thoracentesis - 1.6 liters of serosanguineous fluid were removed and sent to lab.    06/30/20 NM PET - progression of disease with worsening right pleural metastases, increasing metabolic activity of a right cardiophrenic lymph node, and development of a new right supraclavicular lymph node.    07/01/20 consult with Dr Lopez - recommended palliative Taxotere chemotherapy.    07/09/20 IR Pleurex cath placed - 200 ml clear yellow fluid removed.  Patient states another 3-400 ml fluid was removed after cath inserted.     07/15/20 - D1C1 palliative Taxotere chemotherapy.    08/11/20:    CBC - neutrophilia (dex).  Plts WNL.  HgB drifting down @ 10.8.    Found to be mildly iron deficient at his nephrology appointment - started on SR-iron, one daily - tolerating OK.    CMP - mild hyponatremia.  Quite stable CKD.  Mild hypoalbuminemia.  Elevated BS (dex).    On HCTZ 12.5 mg/day through PCP for mild hypertension.    States doesn't drink as much fluid as he should.  Encouraged pushing oral fluids.  Only drinks 2 bottles water/day.  Instructed to increase to 3 bottles/day.    Taking only 1 protein shake/day.  Instructed to increase to 2 shakes/day.    Art presents alone anticipating D1C2 palliative Taxotere chemotherapy.  Breathing much improved since Pleurex cath placed - now only draining 25 ml fluid in the past 9 days.  Stable mild fatigue, arthralgias and myalgias.  He has a history of PAD in his legs - used to walk a mile/day, now limited with the Covid-19 restrictions.  His appetite is \"good\", some early satiety, however his weight has remained quite stable " over the past 6 months.  He still notes mild thoracic back pain.           Tolerating chemotherapy subjectively well.  However, he did have significant neutropenia midcycle with an ANC of 100.      Creatinine has not changed much with the Taxotere chemotherapy - CKD stage G3a.    He has taken the day before and this morning's dose of dexamethasone.  I reviewed instructions to take the dex the day before, of and after each cycle Taxotere chemotherapy with food.    Did not need/use any Compazine as an antinausea.  I encouraged being pro-active with treating any nausea or bowel issues.    Proceed with D1C2 palliative Taxotere chemotherapy.    Instructed if he would note a fever > 100.4F he is to alert us or be seen urgently as antibiotics or even hospitalization may be indicated.    Back pain - degenerative changes throughout the spine:     Managed effectively with Norco (hydrocodone 5/325), 1 @ bedtime + 1-2 ES Tylenol/day.      Reminded to limit Tylenol to 3 grams/day.      Opioid bowel prophylaxis reviewed.     [facility] covid precautions were reviewed, stressing he that becomes quite neutropenic with chemotherapy.  Instructed to follow state and federal recommendations.    Art does not want a midcycle follow up - he will call us if issues or concerns.    09/01/20 - follow up D1C3 palliative Taxotere chemotherapy with labs per Treatment Plan and CT CAP.      2.  Chronic kidney disease stage III.      Likely a primary kidney disorder r/t the nephritis he had as a child.     Follows with Nephrology, Dr. Rubin of [facility] [place].      05/07/20 - Urology f/u with Dr Rubin:    Quite stable CKD since at least 2016     Off ACE - likely the cause of drop in creatinine.     No NSAIDs.     Good hydration.     No indication for renal biopsy at this time.       F/U 3-months.     Stable G3a CKD.    Encouraged pushing oral fluids.    ECOG Performance:     ECOG Performance Status: 1    Distress Assessment:    Distress  "Assessment Score: 1    Pain:    Pain Score (Initial OR Reassessment): 5  Managed with ES tylenol.         TT > 25 minutes face to face with > 50 % in counseling and coordination of care.    Merlin Mendenhall, CNP     CC: Maria De Jesus Fregoso MD Elizabeth Cameron, MD Mulugeta Rubin, DO  ____________________________________    Interim History:    Mr. Jraed Moeller presents alone anticipating D1C2 palliative Taxotere chemotherapy.  His breathing is much improved since the Pleurex cath was placed - now only draining 25 ml fluid in the past 9 days.  Stable mild fatigue, arthralgias and myalgias.  He has a history of PAD in his legs - used to walk a mile/day, now limited with the Covid-19 restrictions.  His appetite is \"good\", some early satiety, however his weight has remained quite stable over the past 6 months.  He still notes thoracic back pain - managed effectively with Norco (hydrocodone 5/325), 1 @ bedtime + 1-2 ES Tylenol/day.  He denies concerning pain, fever or chills, visual or mentation disturbance, difficulty with bowels or urination, skin rash.    Pain Status:    Currently in Pain: Yes - addressed in A&P.    Review of Systems:    Constitutional  Constitutional (WDL): Exceptions to WDL  Fatigue: Fatigue relieved by rest  Fever: None  Chills: None  Weight Gain: None  Weight Loss: None  Neurosensory  Neurosensory (WDL): Exceptions to WDL  Peripheral Motor Neuropathy: None  Ataxia: None  Peripheral Sensory Neuropathy: Asymptomatic, loss of deep tendon reflexes or paresthesia(R chest sensitive to touch)  Confusion: None  Syncope: None  Eye   Eye Disorder (WDL): Exceptions to WDL  Blurred Vision: Intervention not indicated(occ)  Dry Eye: None  Eye Pain: None  Watering Eyes: None  Ear  Ear Disorder (WDL): Exceptions to WDL  Ear Pain: None  Tinnitus: Mild symptoms, intervention not indicated(chronic)  Cardiovascular  Cardiovascular (WDL): Exceptions to WDL  Palpitations: None  Edema: No  Phlebitis: " None  Superficial thrombophlebitis: None  Pulmonary  Respiratory (WDL): Exceptions to WDL  Cough: None  Dyspnea: Shortness of breath with moderate exertion  Hypoxia: None  Gastrointestinal  Gastrointestinal (WDL): Exceptions to WDL  Anorexia: None  Constipation: Occasional or intermittent symptoms, occasional use of stool softeners, laxatives, dietary modification, or enema(Last BM 8/11/20)  Diarrhea: None  Dysphagia: None  Esophagitis: None  Nausea: None  Pharyngitis: None  Vomiting: None  Dysgeusia: None  Dry Mouth: None  Genitourinary  Genitourinary (WDL): All genitourinary elements are within defined limits  Lymphatic  Lymph (WDL): All lymph disorder elements are within defined limits  Musculoskeletal and Connective Tissue  Musculoskeletal and Connetive Tissue Disorders (WDL): Exceptions to WDL  Arthralgia: None  Bone Pain: Moderate pain, limiting instrumental ADL(thoracic, sternum)  Muscle Weakness : Symptomatic, perceived by patient but not evident on physical exam  Myalgia: Moderate pain, limiting instrumental ADL(chest, back)  Integumentary  Integumentary (WDL): Exceptions to WDL  Alopecia: Hair loss of up to 50% of normal for that individual that is not obvious from a distance but only on close inspection, a different hair style may be required to cover the hair loss but it does not require a wig or hair piece to camouflage(says hair is thinning)  Rash Maculo-Papular: None  Pruritus: None  Urticaria: None  Palmar-Plantar Erythrodysesthesia Syndrome: None  Flushing: None  Patient Coping  Patient Coping: Accepting;Open/discussion  Distress Assessment  Distress Assessment Score: 1  Accompanied by  Accompanied by: Alone  Oral Chemo Adherence       Past History:    Past Medical History:   Diagnosis Date     BPH (benign prostatic hyperplasia)      Carotid stenosis, bilateral 04/07/2016    left 50-69%. moderate right side.     Chronic kidney disease (CKD), stage III (moderate) (H)      Colon polyps      COPD  (chronic obstructive pulmonary disease) (H)      Epididymal cyst, left side. 04/07/2016     Hematuria 12/2002    Work-up was negative.     Hypertension      Inguinal hernia     Right     Nephritis     11 years old.     Peripheral vascular disease (H)      Polio     6 years of age. Right leg. Recovered fully.     Shingles 1996    left forehead.         Past Surgical History:   Procedure Laterality Date     COLONOSCOPY       CT BIOPSY LUNG  12/18/2019     CYSTOSCOPY  2002     FLEXIBLE BRONCHOSCOPY  08/14/2019    with EBUS FNA>     IR EXTREMITY ANGIOGRAM BILATERAL  04/06/2016     IR PORT PLACEMENT >5 YEARS  9/11/2019     IR TUNNELED PLEURAL CATHETER INSERTION  7/9/2020     NASAL SEPTUM SURGERY  1980s     TONSILLECTOMY  1948      THORACENTESIS  5/7/2020       Physical Exam:    Recent Vitals 8/11/2020   Weight -   BSA (m2) -   /87   Pulse 115   Temp -   Temp src -   SpO2 -   Some recent data might be hidden       GENERAL:   Pleasant.  Alert and oriented.  Comfortable.  In no acute distress.     HEENT:   Atraumatic and normocephalic.  DANYA.  EOMI.  No pallor.  No icterus. No mucosal lesions.    LYMPH NODES:  No palpable cervical, axillary or inguinal lymphadenopathy.    CHEST:   Diminished breath sounds bilaterally but quite clear L>R.  Pleurex cath in place and secure (R) chest.      Port-a-cath in place.  No s/s infection.    CVS:    S1 and S2 heard.  Regular rate and rhythm. No murmur, gallop or rub heard.  No peripheral edema.    ABDOMEN:   Soft.  Not tender.  Not distended.  No palpable hepatomegaly or splenomegaly, masses or ascites.     EXTREMITIES:  Warm.    SKIN:    No rash, bruising or purpura noted.    Lab Results:    Reviewed with patient.    Recent Results (from the past 24 hour(s))   Comprehensive Metabolic Panel   Result Value Ref Range    Sodium 132 (L) 136 - 145 mmol/L    Potassium 4.3 3.5 - 5.0 mmol/L    Chloride 101 98 - 107 mmol/L    CO2 22 22 - 31 mmol/L    Anion Gap, Calculation 9 5 - 18  mmol/L    Glucose 363 (H) 70 - 125 mg/dL    BUN 44 (H) 8 - 28 mg/dL    Creatinine 1.50 (H) 0.70 - 1.30 mg/dL    GFR MDRD Af Amer 55 (L) >60 mL/min/1.73m2    GFR MDRD Non Af Amer 45 (L) >60 mL/min/1.73m2    Bilirubin, Total 0.2 0.0 - 1.0 mg/dL    Calcium 9.5 8.5 - 10.5 mg/dL    Protein, Total 6.6 6.0 - 8.0 g/dL    Albumin 3.2 (L) 3.5 - 5.0 g/dL    Alkaline Phosphatase 87 45 - 120 U/L    AST 10 0 - 40 U/L    ALT 10 0 - 45 U/L   HM1 (CBC with Diff)   Result Value Ref Range    WBC 14.2 (H) 4.0 - 11.0 thou/uL    RBC 3.79 (L) 4.40 - 6.20 mill/uL    Hemoglobin 10.8 (L) 14.0 - 18.0 g/dL    Hematocrit 33.2 (L) 40.0 - 54.0 %    MCV 88 80 - 100 fL    MCH 28.5 27.0 - 34.0 pg    MCHC 32.5 32.0 - 36.0 g/dL    RDW 17.6 (H) 11.0 - 14.5 %    Platelets 285 140 - 440 thou/uL    MPV 10.5 8.5 - 12.5 fL    Neutrophils % 94 (H) 50 - 70 %    Lymphocytes % 4 (L) 20 - 40 %    Monocytes % 1 (L) 2 - 10 %    Eosinophils % 0 0 - 6 %    Basophils % 0 0 - 2 %    Neutrophils Absolute 13.3 (H) 2.0 - 7.7 thou/uL    Lymphocytes Absolute 0.6 (L) 0.8 - 4.4 thou/uL    Monocytes Absolute 0.1 0.0 - 0.9 thou/uL    Eosinophils Absolute 0.0 0.0 - 0.4 thou/uL    Basophils Absolute 0.0 0.0 - 0.2 thou/uL       Imaging:    No new imaging.

## 2021-06-10 NOTE — PATIENT INSTRUCTIONS - HE
Recent Results (from the past 24 hour(s))   Comprehensive Metabolic Panel   Result Value Ref Range    Sodium 132 (L) 136 - 145 mmol/L    Potassium 4.3 3.5 - 5.0 mmol/L    Chloride 101 98 - 107 mmol/L    CO2 22 22 - 31 mmol/L    Anion Gap, Calculation 9 5 - 18 mmol/L    Glucose 363 (H) 70 - 125 mg/dL    BUN 44 (H) 8 - 28 mg/dL    Creatinine 1.50 (H) 0.70 - 1.30 mg/dL    GFR MDRD Af Amer 55 (L) >60 mL/min/1.73m2    GFR MDRD Non Af Amer 45 (L) >60 mL/min/1.73m2    Bilirubin, Total 0.2 0.0 - 1.0 mg/dL    Calcium 9.5 8.5 - 10.5 mg/dL    Protein, Total 6.6 6.0 - 8.0 g/dL    Albumin 3.2 (L) 3.5 - 5.0 g/dL    Alkaline Phosphatase 87 45 - 120 U/L    AST 10 0 - 40 U/L    ALT 10 0 - 45 U/L   HM1 (CBC with Diff)   Result Value Ref Range    WBC 14.2 (H) 4.0 - 11.0 thou/uL    RBC 3.79 (L) 4.40 - 6.20 mill/uL    Hemoglobin 10.8 (L) 14.0 - 18.0 g/dL    Hematocrit 33.2 (L) 40.0 - 54.0 %    MCV 88 80 - 100 fL    MCH 28.5 27.0 - 34.0 pg    MCHC 32.5 32.0 - 36.0 g/dL    RDW 17.6 (H) 11.0 - 14.5 %    Platelets 285 140 - 440 thou/uL    MPV 10.5 8.5 - 12.5 fL    Neutrophils % 94 (H) 50 - 70 %    Lymphocytes % 4 (L) 20 - 40 %    Monocytes % 1 (L) 2 - 10 %    Eosinophils % 0 0 - 6 %    Basophils % 0 0 - 2 %    Neutrophils Absolute 13.3 (H) 2.0 - 7.7 thou/uL    Lymphocytes Absolute 0.6 (L) 0.8 - 4.4 thou/uL    Monocytes Absolute 0.1 0.0 - 0.9 thou/uL    Eosinophils Absolute 0.0 0.0 - 0.4 thou/uL    Basophils Absolute 0.0 0.0 - 0.2 thou/uL

## 2021-06-11 NOTE — PRE-PROCEDURE
Procedure Name: Right tunneled pleural catheter check with possible revision  Date/Time: 9/28/2020 11:29 AM  Written consent obtained?: Yes  Risks and benefits: Risks, benefits and alternatives were discussed  Consent given by: patient  Expected level of sedation: moderate  ASA Class: Class 3- Severe systemic disease, definite functional limitations  Mallampati: Grade 2- soft palate, base of uvula, tonsillar pillars, and portion of posterior pharyngeal wall visible  Patient states understanding of procedure being performed: Yes  Patient's understanding of procedure matches consent: Yes  Procedure consent matches procedure scheduled: Yes  Appropriately NPO: yes  Lungs: lungs clear with good breath sounds bilaterally  Heart: normal heart sounds and rate  History & Physical reviewed: History and physical reviewed and no updates needed  Statement of review: I have reviewed the lab findings, diagnostic data, medications, and the plan for sedation

## 2021-06-11 NOTE — PROCEDURES
[facility]    Procedure: IR Procedure Note    Date/Time: 9/28/2020 11:44 AM  Performed by: Mulugeta Melendrez MD  Authorized by: Mulugeta Melendrez MD       Universal Protocol    Site marked: Yes    Prior images obtained and reviewed: Yes    Required items: required blood products, implants, devices, and special equipment available    Patient identity confirmed: verbally with patient, arm band, provided demographic data and hospital-assigned identification number    Reevaluation: Patient was reevaluated immediately before administering moderate or deep sedation or anesthesia    Confirmation checklist: patient's identity using two indicators, relevant allergies, procedure was appropriate and matched the consent or emergent situation and correct equipment/implants were available    Time out: Immediately prior to procedure a time out was called to verify the correct patient, procedure, equipment, support staff and site/side marked as required    Universal Protocol: Joint Commission Universal Protocol was followed    Preparation: Patient was prepped and draped in the usual sterile fashion    Anesthesia    Local anesthesia used?: Yes    Sedation    Patient sedation: Yes    Vital signs: Vital signs monitored during sedation  Specimens: none  Complications: None    Post-procedure    Description of procedure: Tunneled right pleural drain removed.  Small right basilar loculation.  No large effusion.    Patient tolerance: Patient tolerated the procedure well with no immediate complications   Length of time physician present for 1:1 monitoring during sedation: 15

## 2021-06-11 NOTE — PROGRESS NOTES
Received intake at 9:54am.  10:00am- Spoke with Eric. I requested the MD place a progress note with our .[id number] smartphrase. He stated he would let the MD know.  10:10am-Spoke with patient regarding home oxygen. He was okay with [facility] setting him up. I offer the patient choice of O2 equipment and he chose our POC. I let him know that I will place the order and will get it delivered within 2 hours. He was thankful.

## 2021-06-11 NOTE — PROGRESS NOTES
Patient ambulated into [facility] by himself. PORT was accessed and labs drawn while [facility] was [facility]. Premeds administered and PPE donned. Taxotere double checked with Elsie Simmons RN. Patient ttolerated infusion without any problems. PORT flushed with Normal Saline and 6 cc Heparin and deaccessed. Dry 2 x 2 gauze taped over PORT site and all questions answered. Patient ambulated out of the [facility]fusion [facility] by himself.

## 2021-06-11 NOTE — PROGRESS NOTES
Patient is here for labs, provider, and treatment for    Primary cancer of right upper lobe of lung (H)

## 2021-06-11 NOTE — TELEPHONE ENCOUNTER
----- Message from Sharmaine Valle MD sent at 8/29/2020  5:24 PM CDT -----  Regarding: hospitalization follow up  Good afternoon,    Please, help me schedule a hospitalization follow-up for Jared Moeller.    Follow-up provider(s): Dr. Barboza   Follow-up timeline: 3-4 weeks.  Needed before the visit: nothing    Thank you,  Sharmaine

## 2021-06-11 NOTE — PROGRESS NOTES
Hospital Follow-up Visit:    Assessment/Plan:     1. Hospital discharge follow-up    2. Pneumonia due to infectious organism, unspecified laterality, unspecified part of lung  Was treated with IV antibiotic and discharged home with oral antibiotic, patient completed treatment, has been doing better.       Subjective:     Jared Moeller is a 79 y.o. male who presents for a hospital discharge follow up.  Was having shortness of breath and fever, admitted for pneumonia, treated with IV antibiotic and discharged home with oral antibiotic, patient tolerated well and is back to baseline.  Right upper lung squamous cancer, chest tube is in place with less drainage compared to a few weeks ago, He will resume chemotherapy next week.      Hospital/Nursing Home/IP Rehab Facility: [facility]  Date of Admission: 8/28/20  Date of Discharge:8/31/20  Reason(s) for Admission:Pneumonia and swollen prostate            Do you have any problems taking your medication regularly?  None       Have you had any changes in your medication since discharge? None       Have you had any difficulty following your discharge or treatment plan?  Yes    Summary of hospitalization:  Hospital discharge summary reviewed  Diagnostic Tests/Treatments reviewed.  Follow up needed: None  Other Healthcare Providers Involved in Patient's Care: Patient Care Team:  Maria De Jesus Fregoso MD as PCP - General (Family Medicine)  Jada Loepz MD as Physician (Hematology and Oncology)  Maria De Jesus Fregoso MD as Assigned PCP  Mckayla Ferrer RN as Oncology Nurse Navigator  Loren Denny MD as Physician (Radiation Oncology)  Mulugeta Rubin DO as Physician (Internal Medicine)  Merlin Mendenhall, CNP as Nurse Practitioner (Hematology and Oncology)      Update since discharge: {improved   Information from family, SNF, care coordination:      Post Discharge Medication Reconciliation: discharge medications reconciled, continue  medications without change  Plan of care communicated with: patient    Objective:     There were no vitals filed for this visit.      Physical Exam:  Physical Examination: General appearance - alert, well appearing, and in no distress  Mental status - alert, oriented to person, place, and time  Chest - , decreased air entry noted right upper lung, but otherwise no wheezing or rhonchi's or rales.  Heart - normal rate, regular rhythm, normal S1, S2, no murmurs, rubs, clicks or gallops  Extremities - peripheral pulses normal, no pedal edema, no clubbing or cyanosis      Coding guidelines for this visit:  Type of Medical   Decision Making Face-to-Face Visit       within 7 Days of discharge Face-to-Face Visit        within 14 days of discharge   Moderate Complexity [id number]   High Complexity [id number]       Electronically signed by Maria De Jesus Fregoso MD 09/10/20 11:10 AM

## 2021-06-11 NOTE — PROGRESS NOTES
[facility] Hematology and Oncology Progress Note    Patient: Jared Moeller  MRN: [id number]  Date of Service: 09/11/2020        Reason for Visit    1. Stage IV non-small cell lung cancer    ECOG Performance   ECOG Performance Status: 1    Distress Assessment  Distress Assessment Score: 2    Pain  Pain Score (Initial OR Reassessment): 3      Assessment/Plan  1. Stage IV adenosquamous cell lung cancer, pleural mets  Tolerated cycle 2 docetaxel fairly well, but this was complicated by hospitalization for COPD exacerbation and pneumonia, without neutropenia. This cycle of chemo was delayed 4.5 weeks. He completed his antibiotics and steroids earlier this week and feels he is fully recovered to baseline.    Labwork adequate. Leukocytosis most likely related to his steroid taper, he has no infectious symptoms.    Clinically, no symptoms of cancer progression. In fact, his right pleural/chest wall pain is a bit better.  Effusion well managed with Pleurx, minimal output.   CT done during recent hospitalization showed stable disease.    Proceed with cycle 3 at same dose.  Return in 3 weeks ahead of cycle 4.    Since he had a recent stable CT, we can defer until he's completed a few more cycles.    2.   COPD  3.   Community-acquire pneumonia  Required 3-day hospitalization two weeks ago. Completed doxycycline and prednisone 3-4 days ago. Feels he is recovered back to baseline. Has not required oxygen for over a week at home. Oxygen sats in office are 98% RA rest and 92-93% RA activity, so we will get this discontinued through [employer].  On maintenance Spiriva. Follow-up with Pulmonary.    4.   CKD  Followed by Nephrology, stable. Encourage fluids on chemo.    5.    Iron-def anemia  On oral iron. Stable    ______________________________________________________________________________    History of Present Illness/ Interval History    Mr. Jared Moeller  is a 79 y.o. who returns for cycle 3 docetaxel. This  cycle has been delayed 1.5 weeks to allow recovery from hospitalization for pneumonia/COPD exacerbation.    Following cycle 2, required hospitalization for COPD exacerbation/community-acquired pneumonia with possible sepsis (BC neg) from 8/28 - 8/31. COVID negative. No neutropenia. Presented with 5-day hx dyspnea, cough, fevers/rigors. Responded to antibiotics, prednisone and dismissed on doxycycline and prednisone taper through ~ 9/9. Required intermittent oxygen at dismissal, but has not required over one week. No fevers. Eating and drinking well.  Chest CT showed stable malignancy.    During hospitalization, was having significant urinary retention requiring Sykes placement. Urology follow-up yesterday and Sykes discontinued. Continues on Flomax.     He feels he tolerates the chemo quite well. No neuropathy. No nausea. No significant fatigue.   Right chest wall/pectoral discomfort has been ongoing several months, thinks feels a bit better in last month.    Review of Systems  Constitutional  Constitutional (WDL): Exceptions to WDL  Fatigue: Concerns(due to pneumonia diagnosis)  Neurosensory  Neurosensory (WDL): All neurosensory elements are within defined limits  Eye   Eye Disorder (WDL): All eye disorder elements are within defined limits  Ear  Ear Disorder (WDL): All ear disorder elements are within defined limits  Cardiovascular  Cardiovascular (WDL): All cardiovascular elements are within defined limits  Pulmonary  Respiratory (WDL): Exceptions to WDL  Dyspnea: Concerns(due to COPD and post pneumonia)  Gastrointestinal  Gastrointestinal (WDL): All gastrointestinal elements are within defined limits  Genitourinary  Genitourinary (WDL): Exceptions to WDL(cathater removed yesterday)  Lymphatic  Lymph (WDL): All lymph disorder elements are within defined limits  Musculoskeletal and Connective Tissue  Musculoskeletal and Connetive Tissue Disorders (WDL): Exceptions to WDL(chest wall pain right sided)  Myalgia:  Concerns(back pain)  Integumentary  Integumentary (WDL): All integumentary elements are within defined limits  Patient Coping  Patient Coping: Accepting;Open/discussion  Accompanied by  Accompanied by: Alone      Oncology History/Treatment  Diagnosis/Stage:   8/2019: Stage IIIB (cT4-cN2-cM0) RUL adenosquamous cell lung cancer  -history of COPD. 46 pack year smoking hx  -CT chest: Central right suprahilar mass obstructive mass with complete RUL atelectasis. Indeterminate 8 mm LLL nodule. Severe emphysema  -PET: 5.6 cm central RUL mass involving right suprahilar and right lower paratracheal mediastinum by direct extension, with associated RUL ateectasis. Separate avid station 4R and 7 nodes. A few prominent right anterior cardiophrenic nodes with low-level uptake indeterminate, but suspicious. Mild areas of subpleural thickening in posterior right pleura indeterminate, no effusion. No distant mets.  -EBUS FNA biopsy mass + med nodes positive for mod diff squamous cell lung cancer  -brain MRI negative  -during work-up auramine rhodamine stain for AFB + from 4R node biopsy. Remainder of TB cultures/work up negative.    12/2019: Stage IV adeno/squamous lung cancer (involving pleura)  -CT showed new/increased right pleural nodules  -Biopsy right nodule: adenocarcinoma  -No targetable mutations    Treatment:  9/9 -10/14/2019: Chemoradiation with weekly carboplatin + taxol + RT (6000 cGy/30)  -Small chance for cure  -Carboplatin chosen due to underlying CKD    11/11/2019 - 1/20/2020: 3 cycles maintenance durvalumab immunotherapy until progression  -PET showed progression in right pleura/chest wall    2/3 - 6/22/2020: 6 cycles palliative Nivolumab until progression  -4/2020 PET: slight progression vs pseudoprogression  -5/2020: R US thoracentesis: 1.6 L of serosanguineous fluid remove  -6/30/20 PET: progression in right pleural mets and new R supraclav node    7/9/2020: IR Pleurx catheter placed    7/15/2020 - present:  "palliative Taxotere  -cycle 1 lesly , without complications      Past History  Past Medical History:   Diagnosis Date     BPH (benign prostatic hyperplasia)      Carotid stenosis, bilateral 04/07/2016    left 50-69%. moderate right side.     Chronic kidney disease (CKD), stage III (moderate) (H)      Colon polyps      COPD (chronic obstructive pulmonary disease) (H)      Epididymal cyst, left side. 04/07/2016     Hematuria 12/2002    Work-up was negative.     Hypertension      Inguinal hernia     Right     Nephritis     11 years old.     Peripheral vascular disease (H)      Polio     6 years of age. Right leg. Recovered fully.     Shingles 1996    left forehead.         Past Surgical History:   Procedure Laterality Date     COLONOSCOPY       CT BIOPSY LUNG  12/18/2019     CYSTOSCOPY  2002     FLEXIBLE BRONCHOSCOPY  08/14/2019    with EBUS FNA>     IR EXTREMITY ANGIOGRAM BILATERAL  04/06/2016     IR PORT PLACEMENT >5 YEARS  9/11/2019     IR TUNNELED PLEURAL CATHETER INSERTION  7/9/2020     NASAL SEPTUM SURGERY  1980s     TONSILLECTOMY  1948      THORACENTESIS  5/7/2020       Physical Exam    Recent Vitals 9/11/2020   Height 5' 4\"   Weight 123 lbs   BSA (m2) 1.59 m2   /66   Pulse 104   Temp 97.6   Temp src 1   SpO2 91   Some recent data might be hidden   O2 sat RA rest 98%  O2 sat RA activity 92-93%    GENERAL: Alert and oriented to time place and person. Seated comfortably. In no distress. Appears well. Tolerated ambulation at a brisk pace in clinic today without hypoxia.Wife joined via speaker phone.  HEAD: Atraumatic and normocephalic. Partial alopecia.  EYES: DANYA, EOMI. No erythema. No icterus.  ORAL CAVITY: Moist. No mucosal lesion or tonsillar enlargement.  NECK: supple. No thyroid enlargement.  LYMPH NODES: No palpable supraclavicular, cervical lymphadenopathy.  CHEST: diminished to auscultation bilaterally. Symmetrical breath movements bilaterally.  CVS: S1 and S2 are heard. Regular rate and " rhythm. No murmur or gallop or rub heard.  ABDOMEN: Soft. Not tender. Not distended. No palpable hepatomegaly or splenomegaly. No other mass palpable. Bowel sounds present.  EXTREMITIES: Warm. No peripheral edema.  SKIN: no rash, or bruising or purpura.   NEURO: No gross deficit noted. Non-antalgic gait.    Lab Results    Recent Results (from the past 168 hour(s))   Comprehensive Metabolic Panel   Result Value Ref Range    Sodium 133 (L) 136 - 145 mmol/L    Potassium 4.6 3.5 - 5.0 mmol/L    Chloride 101 98 - 107 mmol/L    CO2 24 22 - 31 mmol/L    Anion Gap, Calculation 8 5 - 18 mmol/L    Glucose 262 (H) 70 - 125 mg/dL    BUN 38 (H) 8 - 28 mg/dL    Creatinine 1.34 (H) 0.70 - 1.30 mg/dL    GFR MDRD Af Amer >60 >60 mL/min/1.73m2    GFR MDRD Non Af Amer 51 (L) >60 mL/min/1.73m2    Bilirubin, Total 0.2 0.0 - 1.0 mg/dL    Calcium 9.5 8.5 - 10.5 mg/dL    Protein, Total 6.1 6.0 - 8.0 g/dL    Albumin 3.0 (L) 3.5 - 5.0 g/dL    Alkaline Phosphatase 78 45 - 120 U/L    AST 9 0 - 40 U/L    ALT 11 0 - 45 U/L   HM1 (CBC with Diff)   Result Value Ref Range    WBC 19.4 (H) 4.0 - 11.0 thou/uL    RBC 3.51 (L) 4.40 - 6.20 mill/uL    Hemoglobin 10.0 (L) 14.0 - 18.0 g/dL    Hematocrit 31.0 (L) 40.0 - 54.0 %    MCV 88 80 - 100 fL    MCH 28.5 27.0 - 34.0 pg    MCHC 32.3 32.0 - 36.0 g/dL    RDW 19.5 (H) 11.0 - 14.5 %    Platelets 296 140 - 440 thou/uL    MPV 10.6 8.5 - 12.5 fL    Neutrophils % 95 (H) 50 - 70 %    Lymphocytes % 3 (L) 20 - 40 %    Monocytes % 1 (L) 2 - 10 %    Eosinophils % 0 0 - 6 %    Basophils % 0 0 - 2 %    Immature Granulocyte % 1 (H) <=0 %    Neutrophils Absolute 18.4 (H) 2.0 - 7.7 thou/uL    Lymphocytes Absolute 0.6 (L) 0.8 - 4.4 thou/uL    Monocytes Absolute 0.3 0.0 - 0.9 thou/uL    Eosinophils Absolute 0.0 0.0 - 0.4 thou/uL    Basophils Absolute 0.0 0.0 - 0.2 thou/uL    Immature Granulocyte Absolute 0.1 (H) <=0.0 thou/uL       Imaging    Ct Chest Abdomen Pelvis With Oral With Iv Cont    Result Date: 8/27/2020  EXAM:  CT CHEST ABDOMEN PELVIS W ORAL W IV CONTRAST LOCATION: [facility] DATE/TIME: 8/27/2020 10:42 AM INDICATION: Right upper lobe neoplasm post treatment and radiation. Follow-up. COMPARISON: PET CT 06/30/2020. TECHNIQUE: CT scan of the chest, abdomen, and pelvis was performed before and after injection of IV contrast. Multiplanar reformats were obtained. Dose reduction techniques were used. CONTRAST: 100 mL Omnipaque 350. FINDINGS: LUNGS AND PLEURA: Interval placement of tunneled right pleural catheter with improved but persistent small pleural effusion. Right pleural thickening pronounced along the base is without significant change to the prior noncontrast PET/CT. Minimal pleural  air. Right upper lobe suprahilar thickening without significant change. Stable 7-8 mm left lower lobe nodule (images 19). Not well seen on prior CTs from atelectasis and pleural fluid, 10 x 14 mm nodular density peripheral right lung; this was likely present on the 06/30/2020 exam and probably without significant change, though cannot be definitive (series 2, image 52). Minimal patchy left base opacities. Elongated thickening along the peripheral right lower lobe base, similar to prior. Severe emphysema. MEDIASTINUM/AXILLAE: Right perihilar thickening, right supraclavicular and right cardiophrenic angle adenopathy without significant change. Portacatheter. HEPATOBILIARY: Normal. PANCREAS: Normal. SPLEEN: Normal. ADRENAL GLANDS: Stable slight left adrenal thickening. Normal right adrenal. KIDNEYS/BLADDER: Unremarkable. BOWEL: Colonic diverticulosis. No obstruction. LYMPH NODES: No abdominal pelvic adenopathy. VASCULATURE: Severely atherosclerotic aorta. Stable 13 mm left abdominal aortic ulcerated plaque (series 2, image 179). MUSCULOSKELETAL: Similar appearing small lucent right-sided rib lesions.     1.  No significant change to prior.     Cta Chest Pe Run    Result Date: 8/29/2020  EXAM: CTA CHEST PE RUN LOCATION: [facility]  Hospital DATE/TIME: 8/29/2020 1:22 AM INDICATION: Shortness of breath. COMPARISON: CT from 8/27/2020. TECHNIQUE: CT chest pulmonary angiogram during arterial phase injection of IV contrast. Multiplanar reformats and MIP reconstructions were performed. Dose reduction techniques were used.  CONTRAST: 75 mL iohexol (Omni). FINDINGS: ANGIOGRAM CHEST: Pulmonary arteries are normal caliber and negative for pulmonary emboli. Thoracic aorta is negative for dissection. No CT evidence of right heart strain. LUNGS AND PLEURA: Severe emphysematous changes in the lungs. Stable mass-like area of consolidation extending superiorly and posteriorly from the right hilum. Prominent areas of linear parenchymal scarring and architectural distortion with volume loss in  the right hemithorax and scattered nodularity similar to previous. Mild patchy infiltrate versus atelectasis left lung base, not significantly changed. No definite areas of new or progressive infiltrate/consolidation. 9 mm indeterminate nodule left lower lobe on image 201 is unchanged. Small to moderate right pleural effusion some of which is loculated about the right lower hemithorax. Stable right basal percutaneous pleural drainage catheter. Ill-defined malignant areas of pleural thickening about  the right hemithorax most pronounced posteriorly, not significantly changed although better seen on the prior study. MEDIASTINUM/AXILLAE: Ill-defined low-density thickening in the right suprahilar region and right paratracheal region, unchanged. Mild right cardiophrenic angle adenopathy unchanged. Normal heart size. No pericardial effusion. Coronary artery calcification. Esophagus is grossly negative. UPPER ABDOMEN: Normal. MUSCULOSKELETAL: Normal.     1.  Negative for pulmonary embolism. 2.  Overall no significant change compared to the recent study of 8/27/2020. 3.  Severe emphysematous changes in the lungs along with pleural parenchymal scarring and volume loss in the right  hemithorax with architectural distortion similar to previous. Mild patchy subpleural infiltrate versus atelectasis left lung base not significantly changed. No definite new or progressive infiltrate/consolidation. 4.  Small to moderate right pleural effusion along with ill-defined malignant pleural thickening about the right hemithorax, unchanged. 5.  Right suprahilar mass-like consolidation along with right suprahilar and right-sided mediastinal low-density thickening, unchanged. Malignant right cardiophrenic angle adenopathy is also unchanged. 6.  Indeterminate 9 mm nodule left lower lobe is unchanged. See remainder of the details above.       Billing  Total face to face time 20 minutes, with 15 minutes of that dedicated to counseling, education or coordination of care.     Signed by: Corina Parson

## 2021-06-12 NOTE — PROGRESS NOTES
[facility] Hematology and Oncology Progress Note    Patient: Jared Moeller  MRN: [id number]  Date of Service: 10/02/2020        Reason for Visit:    D1C4 palliative Taxotere chemotherapy.     Assessment and Plan:    Cancer Staging    1. Primary cancer of right upper lobe of lung (H)    Clinical Stage IIIB (cT4, cN2, cM0)     79 y.o.     Personal history of COPD.  Quit smoking in 2015 with 46 year p/h.      07/31/19 CT chest - obstructing central right suprahilar mass and complete atelectasis right upper lobe. 8mm rounded indeterminate nodule at left lung base. Severe emphysema.    08/09/19 NM PET - 4.3 x 4.7 x 5.6 cm mass in the central right upper lobe involving the right suprahilar region and right lower paratracheal mediastinum by direct extension. Associated complete right upper lobe atelectasis. Separate FDG avid right lower paratracheal station 4 and subcarinal station 7 (SUVmax 7.2), adenopathy suspicious for arik metastases. Few prominent right anterior cardiophrenic nodes with low-level uptake are indeterminate but suspicious for additional early arik metastases. Mild areas of subpleural thickening in the posterior right chest associated with low-level but appreciable uptake. No pleural effusion. No suspicious focal uptake in the liver skeleton. Normal adrenal glands. Severe emphysema. Moderate atherosclerotic calcifications including the coronary arteries. 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 Marked prostate gland enlargement. Large left scrotal hydrocele. Moderate scattered changes in the spine.    08/14/19 FNA through the endobronchial ultrasound - showed moderately differentiated squamous cell cancer of the lung.  The mediastinal lymph nodes were also positive.    08/28/19 MR brain - No findings to suggest intracranial metastases. No evidence of an acute intracranial abnormality. Findings compatible with mild chronic small vessel  ischemic change.    08/30/19 US kidney - Both kidneys appear echogenic suggesting medical renal disease. There is mild bilateral renal atrophy.  Enlarged prostate gland.    There was concern that he may have pulmonary tuberculosis because the auramine rhodamine stain for acid-fast bacilli was positive and the aspirate from the lower paratracheal lymph node.    Kinyoun stain for AFB came back negative.      Mantoux was negative.      QuantiFERON test was negative.      Sputum AFB x3 was negative by staining and by PCR.      Cultures were negative.      Consult with  of infectious diseases.    Chemoradiation with a small chance of cure was recommended.  Considering his kidney failure, carboplatin rather than cisplatin will be used.     09/09 - 10/14/19 completed weekly carboplatin (AUC 2) + paclitaxel (50 mg/m ) chemotherapy concurrent with 30 fx / 6000 cGy EBRT.      09/11/19 - IR port-a-cath placement.    11/11/19 - 01/20/20 completed 3 cycles maintenance Durvalumab therapy.    12/02/19 NM PET - Mixed response with significant improvement in right suprahilar lung mass and hilar and mediastinal lymphadenopathy but progression of what appear to be right pleural metastases.    12/18/19 CT (R) lung nodule biopsy - c/w adenocarcinoma.  No targetable mutations.    01/22/20 NM PET - Progression of right pleural metastases. Partial favorable treatment response central right upper lobe lung cancer status post radiation therapy. Increased postradiation changes in the right greater than left lungs. No new site of metastasis.    01/22/20 consult with Dr Lopez - with progression of disease outside of the radiation field, he now has metastatic disease involving the pleural surface and chest wall likely because we could not treat him with a sufficiently high dose of  chemotherapy because of his kidney failure.  Art was informed that we no longer have the possibility of cure, rather palliative treatment.  Second line  therapy with nivolumab was recommended.    02/03 - 06/22/20 completed 6 cycles Nivolumab therapy.    02/14/20 - surgical consult for small right inguinal hernia - did not recommend surgery.    04/24/20 NM PET - mild progression of disease, however at this point there is a possibility that this could be pseudo-progression due to immune activation.    05/07/20 (R) US thoracentesis - 1.6 liters of serosanguineous fluid were removed and sent to lab.    06/30/20 NM PET - progression of disease with worsening right pleural metastases, increasing metabolic activity of a right cardiophrenic lymph node, and development of a new right supraclavicular lymph node.    07/01/20 consult with Dr Lopez - recommended palliative Taxotere chemotherapy.    07/09/20 IR Pleurex cath placed - 200 ml clear yellow fluid removed.  Patient states another 3-400 ml fluid was removed after cath inserted.     07/15/20 - D1C1 palliative Taxotere chemotherapy.    C3 delayed ~10 days d/t hospitalization for COPD exacerbation and pneumonia without neutropenia.     08/27/2020 CT CAP - no change after 2 cycles palliative chemotherapy.    10/02/2020:    CBC - neutrophilia (dex).  Plts WNL.  HgB drifting down @ 8.9.    Found to be mildly iron deficient at his nephrology appointment - started on SR-iron, one daily - tolerating OK.    CMP - Quite stable CKD.  Stable hypoalbuminemia.  Elevated BS (dex).    On HCTZ 12.5 mg/day through PCP for mild hypertension.    States doesn't drink as much fluid as he should.  Encouraged pushing oral fluids.  Only drinks 2 bottles water/day.  Instructed to increase to 3 bottles/day.    Not consistently taking daily protein shake.  Instructed to do so and increase to 2 shakes/day if possible.    Art presents alone anticipating D1C4 palliative Taxotere chemotherapy.  Breathing good.  Pleurex cath removed this past Monday.  Stable mild fatigue, arthralgias and myalgias.  He has a history of PAD in his legs - used to walk a  "mile/day, now limited d/t to covid and back pain.  His appetite is \"good\" and weight up 5-10 pounds over the past 6 months.  Rare nausea - perhaps one episode each cycle - has used only 2 anti-emtic tabs.              Tolerating chemotherapy subjectively well.      Creatinine has not changed much with the Taxotere chemotherapy - CKD stage G3a.    He has taken the day before and this morning's dose of dexamethasone.  I reviewed instructions to take the dex the day before, of and after each cycle Taxotere chemotherapy with food.    Rare mild nausea - perhaps one episode each cycle - has used only 2 anti-emtic tabs. I encouraged being pro-active with treating any nausea or bowel issues.    Proceed with D1C4 palliative Taxotere chemotherapy.    Instructed if he would note a fever > 100.4F he is to alert us or be seen urgently as antibiotics or even hospitalization may be indicated.    Back pain - degenerative changes throughout the spine:     Managed effectively with Norco (hydrocodone 5/325), 1 @ bedtime + rare Tylenol, not daily.      Reminded to limit Tylenol to 3 grams/day.      Opioid bowel prophylaxis reviewed.     [facility] covid precautions were reviewed, stressing he that becomes quite neutropenic with chemotherapy.  Instructed to follow state and federal recommendations.    Planning on seasonal flu vaccine mid-cycle    Planning on out-patient Urolift midcycle.  Will have CBC checked the day prior.    10/23/2020 - follow up D1C5 palliative Taxotere chemotherapy with labs per Treatment Plan.    CT CAP prior to C6.      2.  Chronic kidney disease stage III.      Likely a primary kidney disorder r/t the nephritis he had as a child.     Follows with Nephrology, Dr. Rubin of [facility] [place].      05/07/20 - Urology f/u with Dr Rubin:    Quite stable CKD since at least 2016     Off ACE - likely the cause of drop in creatinine.     No NSAIDs.     Good hydration.     No indication for renal biopsy at this " "time.       F/U 3-months.     Stable G3a CKD.    Encouraged pushing oral fluids.    ECOG Performance:     ECOG Performance Status: 1    Distress Assessment:    Distress Assessment Score: 2    Pain:    Pain Score (Initial OR Reassessment): No/Denies Pain  Managed with ES tylenol.         TT > 25 minutes face to face with > 50 % in counseling and coordination of care.    Merlin Mendenhall, CNP     CC: Maria De Jesus Fregoso, MD Loren Denny, MD Mulugeta Rubin, DO  ___________________________________    Interim History:    Mr. Jared Moeller presents alone anticipating D1C4 palliative Taxotere chemotherapy.  His breathing is good.  Pleurex cath removed this past Monday.  Stable mild fatigue, arthralgias and myalgias.  He has a history of PAD in his legs - used to walk a mile/day, now limited d/t to covid and back pain.  His appetite is \"good\" and weight up 5-10 pounds over the past 6 months.  Rare nausea - perhaps one episode each cycle - has used only 2 anti-emtic tabs.  Stable thoracic back pain - managed effectively with Norco (hydrocodone 5/325), 1 @ bedtime + 1-2 ES Tylenol/day.  He denies concerning pain, fever or chills, visual or mentation disturbance, difficulty with bowels or urination, skin rash.    Pain Status:    Currently in Pain: No/denies - addressed in A&P.    Review of Systems:    Constitutional  Constitutional (WDL): Exceptions to WDL  Fatigue: Fatigue relieved by rest  Chills: Mild sensation of cold, shivering, chattering of teeth  Neurosensory  Neurosensory (WDL): All neurosensory elements are within defined limits  Eye   Eye Disorder (WDL): All eye disorder elements are within defined limits  Ear  Ear Disorder (WDL): All ear disorder elements are within defined limits  Cardiovascular  Cardiovascular (WDL): All cardiovascular elements are within defined limits  Pulmonary  Respiratory (WDL): Exceptions to WDL  Dyspnea: Shortness of breath with moderate exertion(with activity, has had a " cold runny nose)  Gastrointestinal  Gastrointestinal (WDL): All gastrointestinal elements are within defined limits  Genitourinary  Genitourinary (WDL): Exceptions to WDL(urolift recommended by urology)  Urinary Retention: Urinary, suprapubic or intermittent catheter placement not indicated, able to void with some residual(hard time starting and stopping)  Lymphatic     Musculoskeletal and Connective Tissue  Musculoskeletal and Connetive Tissue Disorders (WDL): Exceptions to WDL  Bone Pain: Mild pain(chest bone pain mostly right pec)  Myalgia: Mild pain(back and chest)  Integumentary  Integumentary (WDL): All integumentary elements are within defined limits  Patient Coping  Patient Coping: Accepting;Open/discussion  Distress Assessment  Distress Assessment Score: 2  Accompanied by     Oral Chemo Adherence       Past History:    Past Medical History:   Diagnosis Date     BPH (benign prostatic hyperplasia)      Cancer (H)      Carotid stenosis, bilateral 04/07/2016    left 50-69%. moderate right side.     Chronic kidney disease (CKD), stage III (moderate) (H)      Colon polyps      COPD (chronic obstructive pulmonary disease) (H)      Epididymal cyst, left side. 04/07/2016     H/O: lung cancer      Hematuria 12/2002    Work-up was negative.     Hypertension      Inguinal hernia     Right     Nephritis     11 years old.     Peripheral vascular disease (H)      Pneumonia 08/28/2020     Polio     6 years of age. Right leg. Recovered fully.     Shingles 1996    left forehead.         Past Surgical History:   Procedure Laterality Date     COLONOSCOPY       CT BIOPSY LUNG  12/18/2019     CYSTOSCOPY  2002     FLEXIBLE BRONCHOSCOPY  08/14/2019    with EBUS FNA>     IR EXTREMITY ANGIOGRAM BILATERAL  04/06/2016     IR PORT PLACEMENT >5 YEARS  9/11/2019     IR TUNNELED PLEURAL CATHETER INSERTION  7/9/2020     IR TUNNELED PLEURAL CATHETER REMOVAL  09/28/2020     IR TUNNELED PLEURAL CATHETER REMOVAL  9/28/2020     NASAL SEPTUM  SURGERY  1980s     TONSILLECTOMY  1948      THORACENTESIS  5/7/2020       Physical Exam:    Recent Vitals 10/2/2020   Height -   Weight 125 lbs 3 oz   BSA (m2) 1.6 m2   /76   Pulse 102   Temp 97.4   Temp src 1   SpO2 91   Some recent data might be hidden       GENERAL:   Pleasant.  Alert and oriented.  Comfortable.  In no acute distress.     HEENT:   Atraumatic and normocephalic.  DANYA.  EOMI.  No pallor.  No icterus. No mucosal lesions.    LYMPH NODES:  No palpable cervical, axillary or inguinal lymphadenopathy.    CHEST:   Diminished breath sounds bilaterally but quite clear L>R.       Port-a-cath in place.  No s/s infection.    CVS:    S1 and S2 heard.  Regular rate and rhythm. No murmur, gallop or rub heard.  No peripheral edema.    ABDOMEN:   Soft.  Not tender or distended.  No palpable hepatomegaly or splenomegaly, masses or ascites.     EXTREMITIES:  Warm.    SKIN:    No rash, bruising or purpura noted.    Lab Results:    Reviewed with patient.    Recent Results (from the past 24 hour(s))   Comprehensive Metabolic Panel   Result Value Ref Range    Sodium 137 136 - 145 mmol/L    Potassium 4.5 3.5 - 5.0 mmol/L    Chloride 104 98 - 107 mmol/L    CO2 21 (L) 22 - 31 mmol/L    Anion Gap, Calculation 12 5 - 18 mmol/L    Glucose 223 (H) 70 - 125 mg/dL    BUN 27 8 - 28 mg/dL    Creatinine 1.45 (H) 0.70 - 1.30 mg/dL    GFR MDRD Af Amer 57 (L) >60 mL/min/1.73m2    GFR MDRD Non Af Amer 47 (L) >60 mL/min/1.73m2    Bilirubin, Total 0.2 0.0 - 1.0 mg/dL    Calcium 9.6 8.5 - 10.5 mg/dL    Protein, Total 6.3 6.0 - 8.0 g/dL    Albumin 3.0 (L) 3.5 - 5.0 g/dL    Alkaline Phosphatase 85 45 - 120 U/L    AST 10 0 - 40 U/L    ALT 10 0 - 45 U/L   HM1 (CBC with Diff)   Result Value Ref Range    WBC 12.4 (H) 4.0 - 11.0 thou/uL    RBC 3.21 (L) 4.40 - 6.20 mill/uL    Hemoglobin 8.9 (L) 14.0 - 18.0 g/dL    Hematocrit 28.1 (L) 40.0 - 54.0 %    MCV 88 80 - 100 fL    MCH 27.7 27.0 - 34.0 pg    MCHC 31.7 (L) 32.0 - 36.0 g/dL    RDW  18.1 (H) 11.0 - 14.5 %    Platelets 317 140 - 440 thou/uL    MPV 9.9 8.5 - 12.5 fL    Neutrophils % 92 (H) 50 - 70 %    Lymphocytes % 4 (L) 20 - 40 %    Monocytes % 3 2 - 10 %    Eosinophils % 0 0 - 6 %    Basophils % 0 0 - 2 %    Immature Granulocyte % 1 (H) <=0 %    Neutrophils Absolute 11.4 (H) 2.0 - 7.7 thou/uL    Lymphocytes Absolute 0.5 (L) 0.8 - 4.4 thou/uL    Monocytes Absolute 0.4 0.0 - 0.9 thou/uL    Eosinophils Absolute 0.0 0.0 - 0.4 thou/uL    Basophils Absolute 0.0 0.0 - 0.2 thou/uL    Immature Granulocyte Absolute 0.1 (H) <=0.0 thou/uL         Imaging:    No new imaging.

## 2021-06-12 NOTE — TELEPHONE ENCOUNTER
"Patient called, he is very weak, difficult to understand due to dry mouth. He called on 10/22 and reported diarrhea x one week. He started imodium on 10/22.   He says he now has abdominal distention, abdominal pain, only slight amount of liquid stool. He is having to self-cath to empty bladder.   This RN spoke with his spouse due to patient weakness. She said he is \"very bad. Can't even touch him, his skin and everything hurts.\" She does not think he has had fevers. He is too weak for her to assist him to transport to ER. She said he has not been eating. Drank maybe 24 oz yesterday, nothing yet today.   Advised spouse call medics to evaluate patient and probable transport to ER. Spouse verbalizes understanding and agreement with plan.   Laura Matos RN      "

## 2021-06-12 NOTE — PROGRESS NOTES
Art presents for D1C4 Docetaxel following provider visit. PAC previously accessed with [facility] blood return. He tolerated infusion well. Upon completion, PAC heparinized and deaccessed. He left ambulatory per self. Elsei Simmons

## 2021-06-12 NOTE — PATIENT INSTRUCTIONS - HE
Recent Results (from the past 24 hour(s))   Comprehensive Metabolic Panel   Result Value Ref Range    Sodium 137 136 - 145 mmol/L    Potassium 4.5 3.5 - 5.0 mmol/L    Chloride 104 98 - 107 mmol/L    CO2 21 (L) 22 - 31 mmol/L    Anion Gap, Calculation 12 5 - 18 mmol/L    Glucose 223 (H) 70 - 125 mg/dL    BUN 27 8 - 28 mg/dL    Creatinine 1.45 (H) 0.70 - 1.30 mg/dL    GFR MDRD Af Amer 57 (L) >60 mL/min/1.73m2    GFR MDRD Non Af Amer 47 (L) >60 mL/min/1.73m2    Bilirubin, Total 0.2 0.0 - 1.0 mg/dL    Calcium 9.6 8.5 - 10.5 mg/dL    Protein, Total 6.3 6.0 - 8.0 g/dL    Albumin 3.0 (L) 3.5 - 5.0 g/dL    Alkaline Phosphatase 85 45 - 120 U/L    AST 10 0 - 40 U/L    ALT 10 0 - 45 U/L   HM1 (CBC with Diff)   Result Value Ref Range    WBC 12.4 (H) 4.0 - 11.0 thou/uL    RBC 3.21 (L) 4.40 - 6.20 mill/uL    Hemoglobin 8.9 (L) 14.0 - 18.0 g/dL    Hematocrit 28.1 (L) 40.0 - 54.0 %    MCV 88 80 - 100 fL    MCH 27.7 27.0 - 34.0 pg    MCHC 31.7 (L) 32.0 - 36.0 g/dL    RDW 18.1 (H) 11.0 - 14.5 %    Platelets 317 140 - 440 thou/uL    MPV 9.9 8.5 - 12.5 fL    Neutrophils % 92 (H) 50 - 70 %    Lymphocytes % 4 (L) 20 - 40 %    Monocytes % 3 2 - 10 %    Eosinophils % 0 0 - 6 %    Basophils % 0 0 - 2 %    Immature Granulocyte % 1 (H) <=0 %    Neutrophils Absolute 11.4 (H) 2.0 - 7.7 thou/uL    Lymphocytes Absolute 0.5 (L) 0.8 - 4.4 thou/uL    Monocytes Absolute 0.4 0.0 - 0.9 thou/uL    Eosinophils Absolute 0.0 0.0 - 0.4 thou/uL    Basophils Absolute 0.0 0.0 - 0.2 thou/uL    Immature Granulocyte Absolute 0.1 (H) <=0.0 thou/uL

## 2021-06-12 NOTE — TELEPHONE ENCOUNTER
Art left message to call him about diarrhea and that he cancelled his appt for tomorrow due to this. I called Art back and he is having diarrhea which started about 1 week ago and is mostly liquid up to 5 times per day. He is on a probiotic and wanted to know what he should take to help him. I  Instructed him on the proper use of imodium and he verbalized understanding. He will call us back tomorrow if this does not control his diarrhea and he will reschedule for next week. Bailey Rich RN

## 2021-06-12 NOTE — TELEPHONE ENCOUNTER
He could try over-the-counter probiotic, but if symptoms fail to improve or if he prefers, he can schedule a virtual visit to discuss management options.

## 2021-06-12 NOTE — TELEPHONE ENCOUNTER
Patient of Dr. Green's; [facility].  Patient reports it started w/ frequent BM and then became looser.  Patient reports he is using imodium which decrease the diarrhea from 8-9 times per day to about 5-6 times per day.  Patient reported he vomited for the first time today.  Patient reports not getting enough rest but feels very fatigued.  Patient says he can still get up and ambulate but now needs some assistance when before he was independent.  today's BP is 122/62 pulse of 146.  Patient says his pulse being that high is normal for him.  FNA advised patient may need to be seen in the ED but he should consult w/ on-call MD.  Call transferred to [phone] and informed patient to request on-call to be paged to see if he needs to be seen in the ED.  Patient verbalized understanding.      Reason for Disposition    [1] Neutropenia known or suspected (e.g., recent cancer chemotherapy) AND [2] new onset of diarrhea    Additional Information    Negative: Shock suspected (e.g., cold/pale/clammy skin, too weak to stand, low BP, rapid pulse)    Negative: Difficult to awaken or acting confused (e.g., disoriented, slurred speech)    Negative: Sounds like a life-threatening emergency to the triager    Negative: [1] SEVERE abdominal pain (e.g., excruciating) AND [2] present > 1 hour    Negative: [1] SEVERE abdominal pain AND [2] age > 60    Negative: [1] Blood in the stool AND [2] moderate or large amount of blood    Negative: Black or tarry bowel movements  (Exception: chronic-unchanged  black-grey bowel movements AND is taking iron pills or peptobismol)    Protocols used: CANCER - DIARRHEA-A-

## 2021-06-17 NOTE — TELEPHONE ENCOUNTER
Telephone Encounter by Laura Matos RN at 9/21/2020 11:14 AM     Author: Laura Matos RN Service: -- Author Type: Registered Nurse    Filed: 9/21/2020 11:15 AM Encounter Date: 9/21/2020 Status: Signed    : Laura Matos RN (Registered Nurse)       Cancer Care Refill Protocol Passed     Rerun Protocol  (9/21/2020 10:36 AM)       Cancer Care visit in the last 12 months      Last office visit: 9/11/2020 Corina Parson CNP Next office visit within 3 mo: 10/2/2020 Merlin Mendenhall CNP  Last MTM visit: Visit date not found    Prescriber or current provider: Jada Lopez MD  Last diagnosis associated with med order:   1. Primary cancer of right upper lobe of lung (H)  - HYDROcodone-acetaminophen 5-325 mg per tablet; Take 1 tablet by mouth every 6 (six) hours as needed for pain.  Dispense: 30 tablet; Refill: 0     2. Cancer associated pain  - HYDROcodone-acetaminophen 5-325 mg per tablet; Take 1 tablet by mouth every 6 (six) hours as needed for pain.  Dispense: 30 tablet; Refill: 0

## 2021-06-18 NOTE — PATIENT INSTRUCTIONS - HE
Patient Instructions by Maria De Jesus Fregoso MD at 7/8/2020  2:40 PM     Author: Maria De Jesus Fregoso MD Service: -- Author Type: Physician    Filed: 7/11/2020  8:41 AM Encounter Date: 7/8/2020 Status: Signed    : Maria De Jesus Fregoso MD (Physician)         Patient Education     Exercise for a Healthier Heart  You may wonder how you can improve the health of your heart. If youre thinking about exercise, youre on the right track. You dont need to become an athlete, but you do need a certain amount of brisk exercise to help strengthen your heart. If you have been diagnosed with a heart condition, your doctor may recommend exercise to help stabilize your condition. To help make exercise a habit, choose safe, fun activities.       Be sure to check with your health care provider before starting an exercise program.    Why exercise?  Exercising regularly offers many healthy rewards. It can help you do all of the following:    Improve your blood cholesterol levels to help prevent further heart trouble    Lower your blood pressure to help prevent a stroke or heart attack    Control diabetes, or reduce your risk of getting this disease    Improve your heart and lung function    Reach and maintain a healthy weight    Make your muscles stronger and more limber so you can stay active    Prevent falls and fractures by slowing the loss of bone mass (osteoporosis)    Manage stress better  Exercise tips  Ease into your routine. Set small goals. Then build on them.  Exercise on most days. Aim for a total of 150 or more minutes of moderate to  vigorous intensity activity each week. Consider 40 minutes, 3 to 4 times a week. For best results, activity should last for 40 minutes on average. It is OK to work up to the 40 minute period over time. Examples of moderate-intensity activity is walking one mile in 15 minutes or 30 to 45 minutes of yard work.  Step up your daily activity level. Along with your exercise  program, try being more active throughout the day. Walk instead of drive. Do more household tasks or yard work.  Choose one or more activities you enjoy. Walking is one of the easiest things you can do. You can also try swimming, riding a bike, or taking an exercise class.  Stop exercising and call your doctor if you:    Have chest pain or feel dizzy or lightheaded    Feel burning, tightness, pressure, or heaviness in your chest, neck, shoulders, back, or arms    Have unusual shortness of breath    Have increased joint or muscle pain    Have palpitations or an irregular heartbeat      7220-4277 [employer]. [address] [place] [address]. All rights reserved. This information is not intended as a substitute for professional medical care. Always follow your healthcare professional's instructions.         Patient Education   Understanding Advance Care Planning  Advance care planning is the process of deciding ones own future medical care. It helps ensure that if you cant speak for yourself, your wishes can still be carried out. The plan is a series of legal documents that note a persons wishes. The documents vary by state. Advance care planning may be done when a person has a serious illness that is expected to get worse. It may be done before major surgery. And it can help you and your family be prepared in case of a major illness or injury. Advance care planning helps with making decisions at these times.       A health care proxy is a person who acts as the voice of a patient when the patient cant speak for himself or herself. The name of this role varies by state. It may be called a Durable Medical Power of  or Durable Power of  for Healthcare. It may be called an agent, surrogate, or advocate. Or it may be called a representative or decision maker. It is an official duty that is identified by a legal document. The document also varies by state.    Why Is Advance Care  Planning Important?  If a person communicates their healthcare wishes:    They will be given medical care that matches their values and goals.    Their family members will not be forced to make decisions in a crisis with no guidance.  Creating a Plan  Making an advance care plan is often done in 3 steps:    Thinking about ones wishes. To create an advance care plan, you should think about what kind of medical treatment you would want if you lose the ability to communicate. Are there any situations in which you would refuse or stop treatment? Are there therapies you would want or not want? And whom do you want to make decisions for you? There are many places to learn more about how to plan for your care. Ask your doctor or  for resources.    Picking a health care proxy. This means choosing a trusted person to speak for you only when you cant speak for yourself. When you cannot make medical decisions, your proxy makes sure the instructions in your advance care plan are followed. A proxy does not make decisions based on his or her own opinions. They must put aside those opinions and values if needed, and carry out your wishes.    Filling out the legal documents. There are several kinds of legal documents for advance care planning. Each one tells health care providers your wishes. The documents may vary by state. They must be signed and may need to be witnessed or notarized. You can cancel or change them whenever you wish. Depending on your state, the documents may include a Healthcare Proxy form, Living Will, Durable Medical Power of , Advance Directive, or others.  The Familys Role  The best help a family can give is to support their loved ones wishes. Open and honest communication is vital. Family should express any concerns they have about the patients choices while the patient can still make decisions.    0202-4144 [employer]. [address] [place] [address], [place] [address]. All rights  reserved. This information is not intended as a substitute for professional medical care. Always follow your healthcare professional's instructions.         Also, [facility] offers a free, downloadable health care directive that allows you to share your treatment choices and personal preferences if you cannot communicate your wishes. It also allows you to appoint another person (called a health care agent) to make health care decisions if you are unable to do so. You can download an advance directive by going here: [email or web address]     Patient Education   Personalized Prevention Plan  You are due for the preventive services outlined below.  Your care team is available to assist you in scheduling these services.  If you have already completed any of these items, please share that information with your care team to update in your medical record.  Health Maintenance   Topic Date Due   ? ADVANCE CARE PLANNING  12/11/1958   ? MEDICARE ANNUAL WELLNESS VISIT  12/11/2005   ? PNEUMOCOCCAL IMMUNIZATION 65+ HIGH/HIGHEST RISK (2 of 2 - PCV13) 11/10/2007   ? ZOSTER VACCINES (2 of 3) 03/19/2010   ? LIPID  05/27/2019   ? INFLUENZA VACCINE RULE BASED (1) 08/01/2020   ? FALL RISK ASSESSMENT  07/08/2021   ? TD 18+ HE  05/24/2022

## 2021-06-19 NOTE — LETTER
Letter by Concepción Myrick RN at      Author: Concepción Myrick RN Service: -- Author Type: --    Filed:  Encounter Date: 8/16/2019 Status: (Other)       Dear Art,    Thank you for choosing [facility] for your care.  We are committed to providing you with the highest quality and compassionate healthcare services.  The following information pertains to your first appointment with our clinic.    Date/Time of appointment:  Tuesday, August 20, 2019, 12:30       Name of your Physician:   Dr. Jada Lopez    What to bring to your appointment:      Completed Patient History/Initial Nursing Assessment and Medication/Allergy List (these forms were sent to you).    Any paperwork or films from your physician that we have asked you to bring.    Your current insurance card(s).    Parking:      Please refer to the map included to direct you to [facility].    You can park in any visitor/patient parking area you choose. There is no charge for parking at [facility].     Enter the hospital at the front/main entrance.      Please check in with our  representatives who will escort you to the clinic located in [address] of the [facility].    We hope these instructions are helpful to you.  If you have any questions or concerns, please call us at [phone].  It is our pleasure to assist you.    Warm Regards,  Maryuri Myrick RN  Nurse Navigator  [phone]

## 2021-06-20 NOTE — LETTER
Letter by Jada Lopez MD at      Author: Jada Lopez MD Service: -- Author Type: --    Filed:  Encounter Date: 6/26/2020 Status: (Other)         June 26, 2020                      Patient: Jared Moeller   YOB: 1940   Date of Visit: 6/26/2020       Clinical Appeals:  [facility]  [address]  [address]  [place] [address].    Subject: Fast Appeal for a PET/CT scan. Case # [id number]      Dear Medical Director,    I am writing for a fast appeal for reconsideration for a PET CT scan for Mr.Arthur SHAUN Moeller.    This gentleman has a right upper lobe lung cancer that was diagnosed in July 2019.  Initially it was a stage IIIb.  Treated with chemotherapy and radiation and after that he was on Durvalumab for maintenance.  Unfortunately he was found to have progression of disease, pleural stasis and a large pleural effusion that was found by December 2019.  Since then he has been treated with 6 cycles of nivolumab.  Based on my clinical examination I suspect that he has progression of the pleural effusion.  The question is whether he has true progression of disease or whether this is just pseudo-progression with nivolumab.  Unfortunately complicating his care is the fact that he has stage III kidney disease.  This makes it essential that he has a PET CT scan done to assess progression of disease to get him the appropriate care.    I enclose my recent clinic notes.    Especially please consider the following points:  1.  We need to make out for this patient whether he has progression of disease, and whether it is true progression of disease or pseudo-progression on nivolumab.    2.  The only other reasonable option for imaging is a CT scan with IV contrast.  However the patient has chronic kidney disease stage III, and so we cannot give him IV contrast.  A CT scan without IV contrast will be very much inadequate to give us information that we need to give him the correct  treatment.    3.  Given the patient's comorbidities, especially chronic kidney disease stage III, if nivolumab is working for him, we should not be stopping it and changing to conventional chemotherapy.  If we make the mistake of stopping nivolumab due to pseudo-progression, that can significantly impact his life expectancy.         Sincerely,        Jada Lopez MD

## 2021-06-24 NOTE — TELEPHONE ENCOUNTER
Patient called to request the below medication. On further review, the patient has not had contact with this clinic since 10/31/2016 and he does not even have an office visit on file with [facility]. Patient was informed that he would need to be seen for this request as his listed PCP is not longer practicing at [facility]. This information did seem to slightly upset the patient as he reports he pays cash for this prescription land he has had it for years due to his bee venom allergy. In addition, this issue is complicated by the fact that he is not in [place] at this time. Please also see telephone encounter from 10/31/2016 for additional information. This note will be sent to the [facility] incase writter is missing something here. Please have covering provider review.            Refill Request  Did you contact pharmacy: Yes  Medication name: Epipen  Who prescribed the medication: Dave Wallace MD  Pharmacy Name and Location: N/A  Is patient out of medication: Yes  Patient notified refills processed in 72 hours:  no  Okay to leave a detailed message: no

## 2021-06-24 NOTE — TELEPHONE ENCOUNTER
Called pt and he understands that we cannot rx an epi pen as we have not seen him since 2014 in clinic.

## 2021-07-02 NOTE — H&P
H&P by Effie Greenfield Chi, RPA at 2020 11:30 AM     Author: Effie Greenfield Chi, RPA Service: Interventional Radiology Author Type: Physician Assistant    Filed: 2020 11:28 AM Date of Service: 2020 11:30 AM Status: Signed    : Effie Greenfield Chi, RPA (Physician Assistant)         Interventional Radiology - Pre-Procedure Note:  2020    Procedure Requested: Pleural Tunneled Aspira Catheter Removal  Requested by: Jada Lopez MD    History and Physical Reviewed: H&P documented within 30 days (by Corina Parson, CNP on 2020).  I have personally reviewed the patient's medical history and have updated the medical record as necessary.    Brief HPI: Jared Moeller is a 79 y.o. 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     s/p IR left port 2019.    Reports he was admitted end of August for bilateral community acquired pneumoniae with COPD exacerbation.  CT 2020 was done that showed a small to moderate right pleural effusion.  Fluid was drained via right tunneled aspira after CT but only yielded 25 ml of fluid.  Reports since time of tunneled aspira drainage, there has been very little output through drain.      Last drained 1 week for 1 tsp spoon that was clear yellow.  Reports constant chest pain and shortness of breath that appears worsening.   Wife reports 3 pound weight gain that she is concerned about without any changes in his diet.      Reports intermittent pain around tube, denies any leaking around tube, fevers or chills.        IMAGIN2020:  CTA Chest PE RUN  IMPRESSION:   1.  Negative for pulmonary embolism.     2.  Overall no significant change  compared to the recent study of 8/27/2020.     3.  Severe emphysematous changes in the lungs along with pleural parenchymal scarring and volume loss in the right hemithorax with architectural distortion similar to previous. Mild patchy subpleural infiltrate versus atelectasis left lung base not   significantly changed. No definite new or progressive infiltrate/consolidation.     4.  Small to moderate right pleural effusion along with ill-defined malignant pleural thickening about the right hemithorax, unchanged.     5.  Right suprahilar mass-like consolidation along with right suprahilar and right-sided mediastinal low-density thickening, unchanged. Malignant right cardiophrenic angle adenopathy is also unchanged.     6.  Indeterminate 9 mm nodule left lower lobe is unchanged. See remainder of the details above.    7/9/2020:  IR Tunneled Pleural Catheter Placement-RIGHT  PROCEDURE:    Under real-time sonographic guidance, an 18 gauge needle was inserted into the right  pleural fluid from a lateral low intercostal approach. A wire was coiled within the right pleural cavity. A subcutaneous tunnel was created along the anterior chest   requiring a second incision. Using this access, a tunneled drainage catheter was placed under fluoroscopic guidance with its tip in the pleural space. 200 mL of clear yellow fluid were aspirated. The initial dermatotomy was closed with 3-0 absorbable suture and overlying surgical glue. The catheter was sutured to the skin at the tunnel exit site. The catheter may be used immediately.     FINDINGS:  Ultrasound shows a moderate-sized right pleural effusion. A permanent image was stored.        200 mL of clear yellow ascitic fluid were aspirated.      At the completion of the study, the catheter tubing lies along the basilar pleural space.     IMPRESSION:   1.  Successful tunneled pleural drainage catheter placement, as discussed above.  2.  The catheter is ready for use immediately    NPO:  "midnight   ANTICOAGULANTS: pletal this AM  ANTIBIOTICS: IV ancef 2 gm IV     ALLERGIES  Venom-honey bee    LABS:  Lab Results   Component Value Date    WBC 19.4 (H) 09/11/2020    HGB 10.0 (L) 09/11/2020    HCT 31.0 (L) 09/11/2020    MCV 88 09/11/2020     09/11/2020     INR (no units)   Date Value   05/07/2020 0.99     Creatinine (mg/dL)   Date Value   09/11/2020 1.34 (H)       COVID-19 PCR Results    COVID-19 PCR Results 7/8/20 7/8/20 8/29/20 8/29/20 9/25/20    1321 1321 0006 0006    2019-nCOV Test received-See reflex to IDDL test SARS CoV2 (COVID-19) Virus RT-PCR  Test received-See reflex to IDDL test SARS CoV2 (COVID-19) Virus RT-PCR  Not Detected   SARS-CoV-2 PCR Result  NEGATIVE  NEGATIVE       Comments are available for some flowsheets but are not being displayed.           EXAM:  /65   Pulse 100   Temp 97.9  F (36.6  C)   Resp 16   Ht 5' 4\" (1.626 m)   Wt 123 lb (55.8 kg)   SpO2 96%   BMI 21.11 kg/m    General: Stable. In no acute distress.  Neuro: A&O x 3. Moves all extremities equally.  Resp: Lungs clear to auscultation bilaterally.  Cardio: S1S2 and reg, without murmur, clicks or rubs  Abdomen: Soft, non-distended, non-tender,   Skin: Without excoriations, ecchymosis, erythema, lesions or open sores on abdomen/chest. Right drain with suture in place, and dressing CDI.      Pre-Sedation Assessment:  Mallampati Airway Classification: Class 2: upper half of tonsil fossa visible  Previous reaction to anesthesia/sedation: no  Sedation plan based on assessment: Moderate  ASA Classification: ASA 3 - Patient with moderate systemic disease with functional limitations  Comments: partial upper dentures.      ASSESSMENT/PLAN:  Right lung cancer with pleural mets s/p Right tunneled Aspira drain placed 7/2020 now with little output and increasing baseline chest pain and SOB    Right Tunneled Pleural Aspira Catheter Check with possible revision with sedation     Please drain all of patient's " ascites/pleural effusion as recommended by Radiologist in procedure room following Aspira placement. This patient does not require additional post procedure educational drainage.       Procedure, risk/benefits, and sedation reviewed with pt. All questions answered. Consent obtained. OK to proceed with above radiology procedure.     Effie Greenfield  Interventional Radiology

## 2021-07-02 NOTE — H&P
H&P by Effie Greenfield Chi, PA-C at 2020  9:00 AM     Author: Effie Greenfield Chi, PA-C Service: Interventional Radiology Author Type: Physician Assistant    Filed: 2020  8:34 AM Date of Service: 2020  9:00 AM Status: Signed    : Effie Greenfield Chi, PA-C (Physician Assistant)         Interventional Radiology - Pre-Procedure Note:  2020    Procedure Requested: Tunneled Aspira Catheter (RIGHT Pleural)  Requested by: Jada Lopez MD     History and Physical Reviewed: H&P documented within 30 days (by Jada Lopez MD  on 2020).  I have personally reviewed the patient's medical history and have updated the medical record as necessary.    Brief HPI: Jared Moeller is a 79 y.o. 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  Last thora on  for 1600 mL of fluid removed and was told he had residual left in there.    Denies any fevers, chest pain, shortness of breath, dry cough, loss of taste/smell.       IMAGIN2020:  PET CT    2020:  US RIGHT thoracentesis:  1600ml of fluid removed    NPO: midnight   ANTICOAGULANTS: None   ANTIBIOTICS: IV ancef 2 gm IV     ALLERGIES  Venom-honey bee    LABS:  INR (no units)   Date Value   2020 0.99     Hemoglobin (g/dL)   Date Value   2020 11.7 (L)     Platelets (thou/uL)   Date Value   2020 254     Creatinine (mg/dL)   Date Value   2020 1.45 (H)     Potassium (mmol/L)   Date Value   2020 4.5     EXAM:  /81   Pulse (!) 112   Temp 97.8  F (36.6  C) (Oral)   Resp 18   Wt 120 lb (54.4 kg)   SpO2 98%   BMI 20.60 kg/m    General: Stable. In no acute distress.  Neuro: A&O x 3. Moves all extremities equally.  Resp: Lungs clear to auscultation bilaterally although decreased breath sounds right base c/w  left  Cardio: S1S2 and reg, without murmur, clicks or rubs  Abdomen: Soft, non-distended, non-tender, positive bowel sounds.  Skin: Without excoriations, ecchymosis, erythema, lesions or open sores on right posterior chest wall/flank/abdomen    Pre-Sedation Assessment:  Mallampati Airway Classification: Class 2: upper half of tonsil fossa visible  Previous reaction to anesthesia/sedation: no  Sedation plan based on assessment: Moderate  ASA Classification: ASA 3 - Patient with moderate systemic disease with functional limitations  Comments: COPD    ASSESSMENT/PLAN: Right lung cancer, right malignant recurrent symptomatic pleural effusion.      Tunneled Aspira Catheter Placement-RIGHT Pleural with sedation      Patient requires post procedure educational aspira catheter drainage. Please drain 1L for education teaching with patient/family following aspira placement and the remaining fluid should be drained by wall suction.     Patient and family were instructed on the care and management of the Aspira drain. All questions answered and patient/family expressed their understanding of Aspira care/management. Staff RN to continue education and ensure patient is comfortable with drain management prior to discharge. [facility] will fax Aspira gravity bag/dressing kit prescription order to [address] [place] [address] for patient to receive product at home.  Procedure, risk/benefits, and sedation reviewed with pt/family. All questions answered. Consent obtained. OK to proceed with above radiology procedure.     Effie Greenfield  Interventional Radiology

## 2021-07-02 NOTE — H&P
H&P by Shari Brannon NP at 9/11/2019  8:50 AM     Author: Shari Brannon NP Service: Interventional Radiology Author Type: Nurse Practitioner    Filed: 9/11/2019  9:21 AM Date of Service: 9/11/2019  8:50 AM Status: Signed    : Shari Brannon NP (Nurse Practitioner)         Interventional Radiology - History and Physical  9/11/2019    Procedure Requested: Port placement   Requesting Provider: Dr. Lopez     HPI: Jared Moeller is a 78 y.o. old male with a history of HTN, CKD, PVD, COPD, smoking (quit 2015) and and a right upper lobe lung cancer that presents for port placement for ongoing chemotherapy and radiation treatment.      Patient has already started treatment.  Denies any other recent illnesses or fevers.      NPO Status: Midnight   Anticoagulation/Antiplatelets/Bleeding tendencies: None   Antibiotics: Ancef dose ordered pre procedure     Review of Systems: A comprehensive 10-point review of systems was performed. All systems were reviewed and negative with exception to those reported in the HPI.    PMH:  Past Medical History:   Diagnosis Date   ? BPH (benign prostatic hyperplasia)    ? Carotid stenosis, bilateral 04/07/2016    left 50-69%. moderate right side.   ? Chronic kidney disease (CKD), stage III (moderate) (H)    ? Colon polyps    ? COPD (chronic obstructive pulmonary disease) (H)    ? Epididymal cyst, left side. 04/07/2016   ? Hematuria 12/2002    Work-up was negative.   ? Hypertension    ? Inguinal hernia     Right   ? Nephritis     11 years old.   ? Peripheral vascular disease (H)    ? Polio     6 years of age. Right leg. Recovered fully.   ? Shingles 1996    left forehead.       PSH:  Past Surgical History:   Procedure Laterality Date   ? COLONOSCOPY     ? CYSTOSCOPY  2002   ? FLEXIBLE BRONCHOSCOPY  08/14/2019    with EBUS FNA>   ? IR EXTREMITY ANGIOGRAM BILATERAL  04/06/2016   ? NASAL SEPTUM SURGERY  1980s   ? TONSILLECTOMY  1948       ALLERGIES  Venom-honey  "bee    MEDICATIONS:  Current Outpatient Medications on File Prior to Encounter   Medication Sig Dispense Refill   ? cilostazol (PLETAL) 100 MG tablet Take 1 tablet (100 mg total) by mouth 2 (two) times a day. 180 tablet 3   ? epinephrine (EPIPEN INJ) Inject as directed as needed.     ? Lactobacillus rhamnosus GG (CULTURELLE) 15 billion cell CpSP Take 1 capsule by mouth 2 (two) times a day with meals.     ? loratadine (CLARITIN) 10 mg tablet Take 10 mg by mouth daily.     ? prochlorperazine (COMPAZINE) 10 MG tablet Take 1 tablet (10 mg total) by mouth every 6 (six) hours as needed (For breakthrough nausea/vomiting). 30 tablet 1   ? telmisartan-hydrochlorothiazide (MICARDIS HCT) 80-12.5 mg per tablet Take 1 tablet by mouth daily. 90 tablet 3   ? tiotropium (SPIRIVA WITH HANDIHALER) 18 mcg inhalation capsule Place 1 capsule (2 puffs total) into inhaler and inhale daily. (Patient taking differently: Place 18 mcg into inhaler and inhale daily. Possibly every other day      ) 90 capsule 3     No current facility-administered medications on file prior to encounter.        LABS:  INR (no units)   Date Value   04/06/2016 0.99     Hemoglobin (g/dL)   Date Value   09/09/2019 10.2 (L)     Platelets (thou/uL)   Date Value   09/09/2019 274        EXAM:  /67   Pulse 97   Temp 97.8  F (36.6  C) (Oral)   Resp 16   Ht 5' 4\" (1.626 m)   Wt 120 lb (54.4 kg)   SpO2 95%   BMI 20.60 kg/m    General: Stable. In no acute distress  Neuro: A&O x3.  Moves all extremities equally.  Resp: Rate regular, breathing non-labored, left anterior lung sounds diminished, lungs otherwise clear.    Cardio: S1S2 and reg, without murmur, clicks or rubs.  Skin:  Upper chest clean and clear.    MSK:  No gross motor weakness.  Sensation intact.     Pre-Sedation Assessment:  Mallampati Airway Classification: Class 2: upper half of tonsil fossa visible  Previous reaction to anesthesia/sedation: no  Sedation plan based on assessment: Moderate  Sleep " Apnea: no  Dentures: no  COPD: yes  ASA Classification: ASA 3 - Patient with moderate systemic disease with functional limitations  Comments: no hx of asthma       ASSESSMENT:   78 y.o. old male with a right upper lobe lung cancer that presents for port placement for ongoing chemotherapy and radiation treatment.        PLAN:    LEFT chest port placement with sedation.      The procedure, risks and moderate sedation were discussed with patient, all questions answered and patient agrees to proceed with the procedure.  Written consent obtained.      Shari MCRAE, CNP  Interventional Radiology  [phone]

## 2021-07-03 NOTE — ADDENDUM NOTE
Addendum Note by Lauri Barboza MD at 8/2/2019  1:00 PM     Author: Lauri Barboza MD Service: -- Author Type: Physician    Filed: 8/9/2019  3:31 AM Encounter Date: 8/2/2019 Status: Signed    : Lauri Barboza MD (Physician)    Addended by: LAURI BARBOZA on: 8/9/2019 03:31 AM        Modules accepted: Orders, SmartSet

## 2021-07-03 NOTE — ADDENDUM NOTE
Addendum Note by Gema Lance CMA at 8/16/2019  2:00 PM     Author: Gema Lance CMA Service: -- Author Type: Certified Medical Assistant    Filed: 8/19/2019 10:29 AM Encounter Date: 8/16/2019 Status: Signed    : Gema Lance CMA (Certified Medical Assistant)    Addended by: GEMA LANCE on: 8/19/2019 10:29 AM        Modules accepted: Orders

## 2022-03-19 NOTE — PROCEDURES
[facility]    Procedure: CT Right Plerual Biopsy  Date/Time: 12/18/2019 12:16 PM  Performed by: Kin Bradley MD  Authorized by: Kin Bradley MD       Universal Protocol    Site marked: NA    Prior images obtained and reviewed: Yes    Required items: required blood products, implants, devices, and special equipment available    Patient identity confirmed: verbally with patient, arm band, provided demographic data, hospital-assigned identification number and anonymous protocol, patient vented/unresponsive    Reevaluation: Patient was reevaluated immediately before administering moderate or deep sedation or anesthesia    Confirmation checklist: patient's identity using two indicators, relevant allergies, procedure was appropriate and matched the consent or emergent situation and correct equipment/implants were available    Time out: Immediately prior to procedure a time out was called to verify the correct patient, procedure, equipment, support staff and site/side marked as required    Universal Protocol: Joint Commission Universal Protocol was followed    Preparation: Patient was prepped and draped in the usual sterile fashion    ESBL (mL): 0    Anesthesia    Local anesthesia used?: Yes    Anesthesia: local infiltration    Local anesthetic: lidocaine 1% without epinephrine    Anesthetic total (mL): 10    Sedation    Patient sedation: Yes    Sedation type: moderate (conscious) sedation    Sedation: fentanyl, midazolam and see MAR for details    Vital signs: Vital signs monitored during sedation    Post-procedure    Description of procedure: Successful right pleural CT guided biopsy    Patient tolerance: Patient tolerated the procedure well with no immediate complications   Length of time physician present for 1:1 monitoring during sedation: 30               MD Ventura made aware MD Director MD Ventura

## 2023-02-27 NOTE — PATIENT INSTRUCTIONS - HE
Recent Results (from the past 24 hour(s))   Comprehensive Metabolic Panel   Result Value Ref Range    Sodium 139 136 - 145 mmol/L    Potassium 4.2 3.5 - 5.0 mmol/L    Chloride 107 98 - 107 mmol/L    CO2 24 22 - 31 mmol/L    Anion Gap, Calculation 8 5 - 18 mmol/L    Glucose 77 70 - 125 mg/dL    BUN 37 (H) 8 - 28 mg/dL    Creatinine 1.57 (H) 0.70 - 1.30 mg/dL    GFR MDRD Af Amer 52 (L) >60 mL/min/1.73m2    GFR MDRD Non Af Amer 43 (L) >60 mL/min/1.73m2    Bilirubin, Total 0.2 0.0 - 1.0 mg/dL    Calcium 9.5 8.5 - 10.5 mg/dL    Protein, Total 6.9 6.0 - 8.0 g/dL    Albumin 3.4 (L) 3.5 - 5.0 g/dL    Alkaline Phosphatase 86 45 - 120 U/L    AST 15 0 - 40 U/L    ALT 14 0 - 45 U/L   HM1 (CBC with Diff)   Result Value Ref Range    WBC 6.8 4.0 - 11.0 thou/uL    RBC 4.27 (L) 4.40 - 6.20 mill/uL    Hemoglobin 11.8 (L) 14.0 - 18.0 g/dL    Hematocrit 37.1 (L) 40.0 - 54.0 %    MCV 87 80 - 100 fL    MCH 27.6 27.0 - 34.0 pg    MCHC 31.8 (L) 32.0 - 36.0 g/dL    RDW 16.6 (H) 11.0 - 14.5 %    Platelets 262 140 - 440 thou/uL    MPV 10.5 8.5 - 12.5 fL    Neutrophils % 68 50 - 70 %    Lymphocytes % 19 (L) 20 - 40 %    Monocytes % 9 2 - 10 %    Eosinophils % 3 0 - 6 %    Basophils % 1 0 - 2 %    Neutrophils Absolute 4.6 2.0 - 7.7 thou/uL    Lymphocytes Absolute 1.3 0.8 - 4.4 thou/uL    Monocytes Absolute 0.6 0.0 - 0.9 thou/uL    Eosinophils Absolute 0.2 0.0 - 0.4 thou/uL    Basophils Absolute 0.1 0.0 - 0.2 thou/uL        Posterior Auricular Interpolation Flap Text: A decision was made to reconstruct the defect utilizing an interpolation axial flap and a staged reconstruction.  A telfa template was made of the defect.  This telfa template was then used to outline the posterior auricular interpolation flap.  The donor area for the pedicle flap was then injected with anesthesia.  The flap was excised through the skin and subcutaneous tissue down to the layer of the underlying musculature.  The pedicle flap was carefully excised within this deep plane to maintain its blood supply.  The edges of the donor site were undermined.   The donor site was closed in a primary fashion.  The pedicle was then rotated into position and sutured.  Once the tube was sutured into place, adequate blood supply was confirmed with blanching and refill.  The pedicle was then wrapped with xeroform gauze and dressed appropriately with a telfa and gauze bandage to ensure continued blood supply and protect the attached pedicle.

## 2024-07-08 NOTE — PROGRESS NOTES
Patient is here for labs, provider, and treatment for Primary cancer of right upper lobe of lung.   no edema,  no murmurs,  regular rate and rhythm